# Patient Record
Sex: FEMALE | Race: WHITE | NOT HISPANIC OR LATINO | Employment: FULL TIME | URBAN - METROPOLITAN AREA
[De-identification: names, ages, dates, MRNs, and addresses within clinical notes are randomized per-mention and may not be internally consistent; named-entity substitution may affect disease eponyms.]

---
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
## 2021-09-18 ENCOUNTER — IMMUNIZATION (OUTPATIENT)
Dept: VACCINE CLINIC | Facility: HOSPITAL | Age: 21
End: 2021-09-18

## 2021-09-18 PROCEDURE — 91300 HC SARSCOV02 VAC 30MCG/0.3ML IM: CPT | Performed by: INTERNAL MEDICINE

## 2021-09-18 PROCEDURE — 0001A: CPT | Performed by: INTERNAL MEDICINE

## 2021-10-09 ENCOUNTER — IMMUNIZATION (OUTPATIENT)
Dept: VACCINE CLINIC | Facility: HOSPITAL | Age: 21
End: 2021-10-09

## 2021-10-09 PROCEDURE — 0002A: CPT | Performed by: INTERNAL MEDICINE

## 2021-10-09 PROCEDURE — 91300 HC SARSCOV02 VAC 30MCG/0.3ML IM: CPT | Performed by: INTERNAL MEDICINE

## 2022-01-28 ENCOUNTER — OFFICE VISIT (OUTPATIENT)
Dept: FAMILY MEDICINE CLINIC | Facility: CLINIC | Age: 22
End: 2022-01-28

## 2022-01-28 VITALS
BODY MASS INDEX: 22.49 KG/M2 | WEIGHT: 135 LBS | HEART RATE: 85 BPM | OXYGEN SATURATION: 98 % | HEIGHT: 65 IN | SYSTOLIC BLOOD PRESSURE: 118 MMHG | RESPIRATION RATE: 14 BRPM | DIASTOLIC BLOOD PRESSURE: 84 MMHG

## 2022-01-28 DIAGNOSIS — Z11.59 ENCOUNTER FOR HEPATITIS C SCREENING TEST FOR LOW RISK PATIENT: ICD-10-CM

## 2022-01-28 DIAGNOSIS — R53.83 MALAISE AND FATIGUE: ICD-10-CM

## 2022-01-28 DIAGNOSIS — R59.0 ADENOPATHY, CERVICAL: Primary | ICD-10-CM

## 2022-01-28 DIAGNOSIS — R50.9 FEVER AND CHILLS: ICD-10-CM

## 2022-01-28 DIAGNOSIS — B34.9 VIRAL ILLNESS: ICD-10-CM

## 2022-01-28 DIAGNOSIS — Z13.228 SCREENING FOR METABOLIC DISORDER: ICD-10-CM

## 2022-01-28 DIAGNOSIS — R53.81 MALAISE AND FATIGUE: ICD-10-CM

## 2022-01-28 DIAGNOSIS — Z13.220 SCREENING, LIPID: ICD-10-CM

## 2022-01-28 DIAGNOSIS — Z13.21 ENCOUNTER FOR VITAMIN DEFICIENCY SCREENING: ICD-10-CM

## 2022-01-28 PROBLEM — R59.9 ADENOPATHY: Status: ACTIVE | Noted: 2022-01-28

## 2022-01-28 LAB
EXPIRATION DATE: NORMAL
FLUAV AG NPH QL: NEGATIVE
FLUBV AG NPH QL: NEGATIVE
HETEROPH AB SER QL LA: NEGATIVE
INTERNAL CONTROL: NORMAL
Lab: NORMAL
S PYO AG THROAT QL: NEGATIVE

## 2022-01-28 PROCEDURE — 87804 INFLUENZA ASSAY W/OPTIC: CPT | Performed by: NURSE PRACTITIONER

## 2022-01-28 PROCEDURE — 99203 OFFICE O/P NEW LOW 30 MIN: CPT | Performed by: NURSE PRACTITIONER

## 2022-01-28 PROCEDURE — 86308 HETEROPHILE ANTIBODY SCREEN: CPT | Performed by: NURSE PRACTITIONER

## 2022-01-28 PROCEDURE — 87880 STREP A ASSAY W/OPTIC: CPT | Performed by: NURSE PRACTITIONER

## 2022-01-28 RX ORDER — DESOGESTREL AND ETHINYL ESTRADIOL 0.15-0.03
KIT ORAL
COMMUNITY
Start: 2022-01-09 | End: 2022-02-25 | Stop reason: SDUPTHER

## 2022-01-28 NOTE — PROGRESS NOTES
Subjective   Mayra Garner is a 21 y.o. female.     History of Present Illness   Patient is new to this provider. She is here to establish care. She is due for labs. Patient has not been established with primary since moving to [place] from [place], [place].      She presents today for sick visit. Patient presents with fever  x 3 weeks. Patient reports adenopathy in neck, underarms, persistent fever, and headaches. Patient has been seen at urgent care 1/18/22 , has tested negative for covid at home x 3.. Patient denies sick contacts. She lives with boyfriend who is well. Patient has tried over the counter tylenol and ibuprofen with minimal relief.     Patient presents today with dull headache, persistent, worse with bending over. Patient has tried over the counter tylenol and ibuprofen with minimal relief.    Patient reports history of vitamin D deficiency and would like labs today for fatigue. Patient is asking for B12 to be checked with labs.    The following portions of the patient's history were reviewed and updated as appropriate: allergies, current medications, past family history, past medical history, past social history, past surgical history and problem list.    Review of Systems   Constitutional: Positive for activity change, fatigue and fever. Negative for appetite change and unexpected weight loss.   HENT: Positive for congestion, postnasal drip, rhinorrhea, sinus pressure and swollen glands. Negative for sore throat.    Eyes: Negative for blurred vision and double vision.   Respiratory: Negative for cough, shortness of breath and wheezing.    Cardiovascular: Negative for chest pain, palpitations and leg swelling.   Gastrointestinal: Negative for constipation, diarrhea, nausea, vomiting and GERD.   Endocrine: Negative for cold intolerance and heat intolerance.   Genitourinary: Negative for dysuria.   Musculoskeletal: Positive for myalgias. Negative for arthralgias.   Neurological: Positive for  headache. Negative for dizziness and light-headedness.   Psychiatric/Behavioral: Negative for depressed mood.       Objective   Physical Exam  Constitutional:       Appearance: Normal appearance. She is normal weight.   HENT:      Right Ear: Tympanic membrane and ear canal normal.      Left Ear: Tympanic membrane and ear canal normal.      Mouth/Throat:      Mouth: Mucous membranes are moist.   Eyes:      Pupils: Pupils are equal, round, and reactive to light.   Cardiovascular:      Pulses: Normal pulses.      Heart sounds: Normal heart sounds.   Pulmonary:      Effort: Pulmonary effort is normal.   Abdominal:      General: Abdomen is flat. Bowel sounds are normal.      Tenderness: There is no abdominal tenderness.   Musculoskeletal:         General: Normal range of motion.      Cervical back: Normal range of motion.   Lymphadenopathy:      Cervical: Cervical adenopathy present.   Skin:     General: Skin is warm and dry.      Capillary Refill: Capillary refill takes less than 2 seconds.   Neurological:      Mental Status: She is alert.   Psychiatric:         Mood and Affect: Mood normal.         Vitals:    01/28/22 1001   BP: 118/84   Pulse: 85   Resp: 14   SpO2: 98%     Body mass index is 22.47 kg/m².    Procedures    Assessment/Plan   Problems Addressed this Visit        Symptoms and Signs    Malaise and fatigue    Relevant Orders    CBC & Differential    TSH    Vitamin B12    Folate      Other Visit Diagnoses     Adenopathy, cervical    -  Primary    Relevant Orders    POCT Influenza A/B (Completed)    POCT rapid strep A (Completed)    POCT Infectious mononucleosis antibody (Completed)    COVID-19,[facility] ROUTINE, NP/OP SWAB IN TRANSPORT MEDIA OR ESWAB 72 HR TAT - Swab, Nasopharynx    Fever and chills        Relevant Orders    POCT Influenza A/B (Completed)    POCT rapid strep A (Completed)    POCT Infectious mononucleosis antibody (Completed)    COVID-19,[facility] ROUTINE, NP/OP SWAB IN TRANSPORT MEDIA OR ESWAB 72  HR TAT - Swab, Nasopharynx    Screening for metabolic disorder        Relevant Orders    CBC & Differential    TSH    Vitamin B12    Screening, lipid        Relevant Orders    Lipid panel    Encounter for vitamin deficiency screening        Relevant Orders    Vitamin D 25 hydroxy    Viral illness        Encounter for hepatitis C screening test for low risk patient        Relevant Orders    Hepatitis C Antibody      Diagnoses       Codes Comments    Adenopathy, cervical    -  Primary ICD-10-CM: R59.0  ICD-9-CM: 785.6     Fever and chills     ICD-10-CM: R50.9  ICD-9-CM: 780.60     Screening for metabolic disorder     ICD-10-CM: Z13.228  ICD-9-CM: V77.99     Screening, lipid     ICD-10-CM: Z13.220  ICD-9-CM: V77.91     Malaise and fatigue     ICD-10-CM: R53.81, R53.83  ICD-9-CM: 780.79     Encounter for vitamin deficiency screening     ICD-10-CM: Z13.21  ICD-9-CM: V77.99     Viral illness     ICD-10-CM: B34.9  ICD-9-CM: 079.99     Encounter for hepatitis C screening test for low risk patient     ICD-10-CM: Z11.59  ICD-9-CM: V73.89       CBC  CMP  Lipids  TSH  B12/folate/vit D    Fever/chills/adenopathy- flu/strep/mono/covid pcr, likely viral etiology  Rest, hydrate, fluids, will check labs and covid PCR, call for conc[facility]rs. [facility]    Follow up in 6 mo for annual and pap       Return in about 6 months (around 7/28/2022) for Annual.

## 2022-01-29 LAB
25(OH)D3+25(OH)D2 SERPL-MCNC: 35.5 NG/ML (ref 30–100)
BASOPHILS # BLD AUTO: 0 X10E3/UL (ref 0–0.2)
BASOPHILS NFR BLD AUTO: 1 %
CHOLEST SERPL-MCNC: 165 MG/DL (ref 100–199)
EOSINOPHIL # BLD AUTO: 0.1 X10E3/UL (ref 0–0.4)
EOSINOPHIL NFR BLD AUTO: 2 %
ERYTHROCYTE [DISTWIDTH] IN BLOOD BY AUTOMATED COUNT: 11.8 % (ref 11.7–15.4)
FOLATE SERPL-MCNC: 18 NG/ML
HCT VFR BLD AUTO: 40.9 % (ref 34–46.6)
HDLC SERPL-MCNC: 85 MG/DL
HGB BLD-MCNC: 13.2 G/DL (ref 11.1–15.9)
IMM GRANULOCYTES # BLD AUTO: 0 X10E3/UL (ref 0–0.1)
IMM GRANULOCYTES NFR BLD AUTO: 0 %
LDLC SERPL CALC-MCNC: 66 MG/DL (ref 0–99)
LYMPHOCYTES # BLD AUTO: 1.6 X10E3/UL (ref 0.7–3.1)
LYMPHOCYTES NFR BLD AUTO: 45 %
MCH RBC QN AUTO: 28.1 PG (ref 26.6–33)
MCHC RBC AUTO-ENTMCNC: 32.3 G/DL (ref 31.5–35.7)
MCV RBC AUTO: 87 FL (ref 79–97)
MONOCYTES # BLD AUTO: 0.3 X10E3/UL (ref 0.1–0.9)
MONOCYTES NFR BLD AUTO: 9 %
NEUTROPHILS # BLD AUTO: 1.6 X10E3/UL (ref 1.4–7)
NEUTROPHILS NFR BLD AUTO: 43 %
PLATELET # BLD AUTO: 297 X10E3/UL (ref 150–450)
RBC # BLD AUTO: 4.7 X10E6/UL (ref 3.77–5.28)
TRIGL SERPL-MCNC: 74 MG/DL (ref 0–149)
TSH SERPL-ACNC: 1.16 UIU/ML (ref 0.45–4.5)
VIT B12 SERPL-MCNC: 574 PG/ML (ref 232–1245)
VLDLC SERPL CALC-MCNC: 14 MG/DL (ref 5–40)
WBC # BLD AUTO: 3.6 X10E3/UL (ref 3.4–10.8)

## 2022-01-30 LAB
HCV AB S/CO SERPL IA: <0.1 S/CO RATIO (ref 0–0.9)
LABCORP SARS-COV-2, NAA 2 DAY TAT: NORMAL
SARS-COV-2 RNA RESP QL NAA+PROBE: NOT DETECTED

## 2022-02-25 ENCOUNTER — OFFICE VISIT (OUTPATIENT)
Dept: FAMILY MEDICINE CLINIC | Facility: CLINIC | Age: 22
End: 2022-02-25

## 2022-02-25 VITALS
HEIGHT: 65 IN | BODY MASS INDEX: 21.99 KG/M2 | DIASTOLIC BLOOD PRESSURE: 78 MMHG | RESPIRATION RATE: 14 BRPM | WEIGHT: 132 LBS | OXYGEN SATURATION: 100 % | HEART RATE: 84 BPM | SYSTOLIC BLOOD PRESSURE: 118 MMHG

## 2022-02-25 DIAGNOSIS — Z23 COVID-19 VACCINE ADMINISTERED: ICD-10-CM

## 2022-02-25 DIAGNOSIS — Z00.00 ENCOUNTER FOR SCREENING AND PREVENTATIVE CARE: Primary | ICD-10-CM

## 2022-02-25 DIAGNOSIS — Z13.228 SCREENING FOR METABOLIC DISORDER: ICD-10-CM

## 2022-02-25 DIAGNOSIS — Z30.41 ENCOUNTER FOR SURVEILLANCE OF CONTRACEPTIVE PILLS: ICD-10-CM

## 2022-02-25 PROBLEM — R53.81 MALAISE AND FATIGUE: Status: RESOLVED | Noted: 2022-01-28 | Resolved: 2022-02-25

## 2022-02-25 PROBLEM — R53.83 MALAISE AND FATIGUE: Status: RESOLVED | Noted: 2022-01-28 | Resolved: 2022-02-25

## 2022-02-25 PROBLEM — R59.9 ADENOPATHY: Status: RESOLVED | Noted: 2022-01-28 | Resolved: 2022-02-25

## 2022-02-25 PROCEDURE — 91305 COVID-19 (PFIZER) 12+ YRS: CPT | Performed by: NURSE PRACTITIONER

## 2022-02-25 PROCEDURE — 99395 PREV VISIT EST AGE 18-39: CPT | Performed by: NURSE PRACTITIONER

## 2022-02-25 PROCEDURE — 0051A COVID-19 (PFIZER) 12+ YRS: CPT | Performed by: NURSE PRACTITIONER

## 2022-02-25 RX ORDER — DESOGESTREL AND ETHINYL ESTRADIOL 0.15-0.03
1 KIT ORAL DAILY
Qty: 28 TABLET | Refills: 3 | Status: SHIPPED | OUTPATIENT
Start: 2022-02-25 | End: 2022-04-15

## 2022-02-25 NOTE — PATIENT INSTRUCTIONS
Preventive Care 18-21 Years Old, Female  Preventive care refers to lifestyle choices and visits with your health care provider that can promote health and wellness. At this stage in your life, you may start seeing a primary care physician instead of a pediatrician. It is important to take responsibility for your health and well-being. Preventive care for young adults includes:  · A yearly physical exam. This is also called an annual wellness visit.  · Regular dental and eye exams.  · Immunizations.  · Screening for certain conditions.  · Healthy lifestyle choices, such as:  ? Eating a healthy diet.  ? Getting regular exercise.  ? Not using drugs or products that contain nicotine and tobacco.  ? Limiting alcohol use.  What can I expect for my preventive care visit?  Physical exam  Your health care provider may check your:  · Height and weight. These may be used to calculate your BMI (body mass index). BMI is a measurement that tells if you are at a healthy weight.  · Heart rate and blood pressure.  · Body temperature.  · Skin for abnormal spots.  Counseling  Your health care provider may ask you questions about your:  · Past medical problems.  · Family's medical history.  · Alcohol, tobacco, and drug use.  · Home life and relationship well-being.  · Access to firearms.  · Emotional well-being.  · Diet, exercise, and sleep habits.  · Sexual activity and sexual health.  · Method of birth control.  · Menstrual cycle.  · Pregnancy history.  What immunizations do I need?    Vaccines are usually given at various ages, according to a schedule. Your health care provider will recommend vaccines for you based on your age, medical history, and lifestyle or other factors, such as travel or where you work.  What tests do I need?  Blood tests  · Lipid and cholesterol levels. These may be checked every 5 years starting at age 20.  · Hepatitis C test.  · Hepatitis B test.  Screening  · Pelvic exam and Pap test. This may be done  every 3 years starting at age 21.  · STD (sexually transmitted disease) testing, if you are at risk.  · BRCA-related cancer screening. This may be done if you have a family history of breast, ovarian, tubal, or peritoneal cancers.  Other tests  · Tuberculosis skin test.  · Vision and hearing tests.  · Skin exam.  · Breast exam.  Talk with your health care provider about your test results, treatment options, and if necessary, the need for more tests.  Follow these instructions at home:  Eating and drinking    · Eat a healthy diet that includes fresh fruits and vegetables, whole grains, lean protein, and low-fat dairy products.  · Drink enough fluid to keep your urine pale yellow.  · Do not drink alcohol if:  ? Your health care provider tells you not to drink.  ? You are pregnant, may be pregnant, or are planning to become pregnant.  ? You are under the legal drinking age. In the [place] the legal drinking age is 21.  · If you drink alcohol:  ? Limit how much you use to 0-1 drink a day.  ? Be aware of how much alcohol is in your drink. In the [place] one drink equals one 12 oz bottle of beer (355 mL), one 5 oz glass of wine (148 mL), or one 1½ oz glass of hard liquor (44 mL).    Lifestyle  · Take daily care of your teeth and gums. Brush your teeth every morning and night with fluoride toothpaste. Floss one time each day.  · Stay active. Exercise for at least 30 minutes 5 or more days of the week.  · Do not use any products that contain nicotine or tobacco, such as cigarettes, e-cigarettes, and chewing tobacco. If you need help quitting, ask your health care provider.  · Do not use drugs.  · If you are sexually active, practice safe sex. Use a condom or other form of birth control (contraception) in order to prevent pregnancy and STIs (sexually transmitted infections). If you plan to become pregnant, see your health care provider for a pre-conception visit.  · Find healthy ways to cope with stress, such as:  ? Meditation,  yoga, or listening to music.  ? Journaling.  ? Talking to a trusted person.  ? Spending time with friends and family.  Safety  · Always wear your seat belt while driving or riding in a vehicle.  · Do not drive:  ? If you have been drinking alcohol. Do not ride with someone who has been drinking.  ? When you are tired or distracted.  ? While texting.  · Wear a helmet and other protective equipment during sports activities.  · If you have firearms in your house, make sure you follow all gun safety procedures.  · Seek help if you have been bullied, physically abused, or sexually abused.  · Use the Internet responsibly to avoid dangers, such as online bullying and online sex predators.  What's next?  · Go to your health care provider once a year for an annual wellness visit.  · Ask your health care provider how often you should have your eyes and teeth checked.  · Stay up to date on all vaccines.  This information is not intended to replace advice given to you by your health care provider. Make sure you discuss any questions you have with your health care provider.  Document Revised: 09/02/2020 Document Reviewed: 12/12/2019  Elsevier Patient Education © 2021 [employer]

## 2022-02-25 NOTE — PROGRESS NOTES
Subjective   Mayra Garner is a 21 y.o. female.     History of Present Illness   Patient is due annual exam.  Patient's sore throat and fatigued has improved. She is asking for birth control refills today. Reports some intermittent, heavy periods. Patient has been on Apri x 3 months. Patient lives with longterm boyfriend. She denies alcohol, tobacoo or drug use. Denies depression or anxiety.   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 Requesting paperwork today at visit.     The following portions of the patient's history were reviewed and updated as appropriate: allergies, current medications, past family history, past medical history, past social history, past surgical history and problem list.    Review of Systems   Constitutional: Negative for activity change, fatigue, unexpected weight gain and unexpected weight loss.   HENT: Negative for congestion and postnasal drip.    Eyes: Negative for blurred vision and double vision.   Respiratory: Negative for cough and chest tightness.    Cardiovascular: Negative for chest pain, palpitations and leg swelling.   Gastrointestinal: Negative for abdominal pain, constipation, diarrhea and GERD.   Endocrine: Negative for cold intolerance, heat intolerance, polydipsia, polyphagia and polyuria.   Genitourinary: Negative for dysuria, frequency, menstrual problem, pelvic pain and pelvic pressure.   Musculoskeletal: Negative for arthralgias.   Neurological: Negative for dizziness.   Hematological: Does not bruise/bleed easily.   Psychiatric/Behavioral: Negative for depressed mood. The patient is not nervous/anxious.        Objective   Physical Exam  Constitutional:       Appearance: Normal appearance. She is normal weight.   HENT:      Head: Normocephalic.      Right Ear: Tympanic membrane normal.      Left Ear: Tympanic membrane normal.      Nose: Nose normal.      Mouth/Throat:      Mouth: Mucous membranes are moist.   Eyes:       Pupils: Pupils are equal, round, and reactive to light.   Cardiovascular:      Rate and Rhythm: Normal rate and regular rhythm.      Pulses: Normal pulses.      Heart sounds: Normal heart sounds.   Pulmonary:      Effort: Pulmonary effort is normal.      Breath sounds: Normal breath sounds.   Abdominal:      General: Abdomen is flat. Bowel sounds are normal.      Palpations: Abdomen is soft.   Musculoskeletal:         General: Normal range of motion.      Cervical back: Normal range of motion.   Skin:     General: Skin is warm.   Neurological:      General: No focal deficit present.      Mental Status: She is alert.   Psychiatric:         Mood and Affect: Mood normal.         Vitals:    02/25/22 0814   BP: 118/78   Pulse: 84   Resp: 14   SpO2: 100%     Body mass index is 21.97 kg/m².    Procedures    Assessment/Plan   Problems Addressed this Visit     None      Visit Diagnoses     Encounter for screening and preventative care    -  Primary    Screening for metabolic disorder        Encounter for surveillance of contraceptive pills        COVID-19 vaccine administered          Diagnoses       Codes Comments    Encounter for screening and preventative care    -  Primary ICD-10-CM: Z00.00  ICD-9-CM: V70.0     Screening for metabolic disorder     ICD-10-CM: Z13.228  ICD-9-CM: V77.99     Encounter for surveillance of contraceptive pills     ICD-10-CM: Z30.41  ICD-9-CM: V25.41     COVID-19 vaccine administered     ICD-10-CM: Z23  ICD-9-CM: V04.89       Reviewed all recent labs/up to date on screening  Signed PPW for patient to provide respite care for [facility]    OCP- reviewed medication that effect profile, refills sent encourage safe sex practices, patient to return to clinic for Pap smear    Covid booster administered today, patient tolerated well, encourage masking, social distancing, handwashing.  Follow CDC guidelines.    Preventative care-education provided for heart healthy diet, drink water, walk daily, wear  a seatbelt, patient will return for Pap smear and HPV vaccination series.    Return in 1 year for annual   MyChart is active  Reviewed preventative care measures see education with after visit summary         Return in about 1 year (around 2/25/2023) for Annual.

## 2022-03-30 ENCOUNTER — OFFICE VISIT (OUTPATIENT)
Dept: FAMILY MEDICINE CLINIC | Facility: CLINIC | Age: 22
End: 2022-03-30

## 2022-03-30 ENCOUNTER — HOSPITAL ENCOUNTER (OUTPATIENT)
Dept: GENERAL RADIOLOGY | Facility: HOSPITAL | Age: 22
Discharge: HOME | End: 2022-03-30
Admitting: NURSE PRACTITIONER

## 2022-03-30 VITALS
HEIGHT: 65 IN | RESPIRATION RATE: 14 BRPM | HEART RATE: 70 BPM | SYSTOLIC BLOOD PRESSURE: 138 MMHG | OXYGEN SATURATION: 99 % | DIASTOLIC BLOOD PRESSURE: 82 MMHG | WEIGHT: 132 LBS | BODY MASS INDEX: 21.99 KG/M2

## 2022-03-30 DIAGNOSIS — R10.84 GENERALIZED ABDOMINAL PAIN: Primary | ICD-10-CM

## 2022-03-30 PROCEDURE — 74019 RADEX ABDOMEN 2 VIEWS: CPT

## 2022-03-30 PROCEDURE — 99213 OFFICE O/P EST LOW 20 MIN: CPT | Performed by: NURSE PRACTITIONER

## 2022-03-30 NOTE — PROGRESS NOTES
Subjective   Mayra Garner is a 21 y.o. female.   Abdominal pain  History of Present Illness  Started about 3 weeks ago. After she eats she has pain and becomes diaphoretic. She went to the Urgent Care a week ago and they told her to take some Nexium which has not helped.  Is having a bowel movement daily Sometimes they vary in size.  Other than the Nexium she has not taken any other over-the-counter medications.    The following portions of the patient's history were reviewed and updated as appropriate: allergies, current medications, past family history, past medical history, past social history, past surgical history and problem list.    Review of Systems   Constitutional: Positive for activity change and diaphoresis. Negative for appetite change, chills and fatigue.   Respiratory: Negative for cough and shortness of breath.    Cardiovascular: Negative for chest pain.   Gastrointestinal: Positive for constipation and nausea. Negative for abdominal distention, abdominal pain and vomiting.       Objective   Physical Exam    Assessment/Plan   Diagnoses and all orders for this visit:    1. Generalized abdominal pain (Primary)  -     XR abdomen flat and upright

## 2022-04-15 ENCOUNTER — OFFICE VISIT (OUTPATIENT)
Dept: FAMILY MEDICINE CLINIC | Facility: CLINIC | Age: 22
End: 2022-04-15

## 2022-04-15 VITALS
OXYGEN SATURATION: 99 % | DIASTOLIC BLOOD PRESSURE: 72 MMHG | SYSTOLIC BLOOD PRESSURE: 104 MMHG | HEIGHT: 65 IN | BODY MASS INDEX: 22.82 KG/M2 | RESPIRATION RATE: 12 BRPM | HEART RATE: 72 BPM | WEIGHT: 137 LBS

## 2022-04-15 DIAGNOSIS — Z30.09 ENCOUNTER FOR OTHER GENERAL COUNSELING OR ADVICE ON CONTRACEPTION: Primary | ICD-10-CM

## 2022-04-15 DIAGNOSIS — K59.01 SLOW TRANSIT CONSTIPATION: ICD-10-CM

## 2022-04-15 PROCEDURE — 99213 OFFICE O/P EST LOW 20 MIN: CPT | Performed by: NURSE PRACTITIONER

## 2022-04-15 RX ORDER — NORGESTIMATE AND ETHINYL ESTRADIOL 0.25-0.035
1 KIT ORAL DAILY
Qty: 28 TABLET | Refills: 12 | Status: SHIPPED | OUTPATIENT
Start: 2022-04-15 | End: 2022-06-09 | Stop reason: SDUPTHER

## 2022-04-15 NOTE — PROGRESS NOTES
Subjective   Mayra Garner is a 21 y.o. female.     History of Present Illness   Patient is known to this provider.   Patient here to discuss birth control. She has been on apri and switched but is unsure of name of new pill. She has had 3 week long period. She has had IUD in the past and did not like this. Normal menstrual cramps and fatigue.  Patient has taken Berta in the past and tolerated well.    Patient presents with new onset constipation over last month, she has tried ducolax and miralax. She has made dietary changes and started new workout routine and is trying to get 150 gm protein/day. She drinks 1 gallon of water a day.     The following portions of the patient's history were reviewed and updated as appropriate: allergies, current medications, past family history, past medical history, past social history, past surgical history and problem list.    Review of Systems   Constitutional: Positive for fatigue.   Respiratory: Negative for cough, shortness of breath and stridor.    Cardiovascular: Negative for chest pain, palpitations and leg swelling.   Gastrointestinal: Positive for constipation. Negative for diarrhea and GERD.   Genitourinary: Positive for menstrual problem and vaginal bleeding.   Skin: Negative for pallor.   Neurological: Negative for weakness and headache.   Hematological: Does not bruise/bleed easily.   Psychiatric/Behavioral: Negative for depressed mood. The patient is not nervous/anxious.        Objective   Physical Exam  Constitutional:       Appearance: Normal appearance. She is normal weight.   HENT:      Head: Normocephalic.      Right Ear: Tympanic membrane normal.      Left Ear: Tympanic membrane normal.      Nose: Nose normal.      Mouth/Throat:      Mouth: Mucous membranes are moist.   Eyes:      Pupils: Pupils are equal, round, and reactive to light.   Cardiovascular:      Rate and Rhythm: Normal rate and regular rhythm.      Pulses: Normal pulses.      Heart sounds: Normal  heart sounds.   Pulmonary:      Effort: Pulmonary effort is normal.      Breath sounds: Normal breath sounds.   Abdominal:      General: Abdomen is flat. Bowel sounds are normal.      Palpations: Abdomen is soft.   Musculoskeletal:         General: Normal range of motion.      Cervical back: Normal range of motion.   Skin:     General: Skin is warm.   Neurological:      General: No focal deficit present.      Mental Status: She is alert.   Psychiatric:         Mood and Affect: Mood normal.         Vitals:    04/15/22 0847   BP: 104/72   Pulse: 72   Resp: 12   SpO2: 99%     Body mass index is 22.8 kg/m².    Procedures    Assessment/Plan   Problems Addressed this Visit    None     Visit Diagnoses     Encounter for other general counseling or advice on contraception    -  Primary      Diagnoses       Codes Comments    Encounter for other general counseling or advice on contraception    -  Primary ICD-10-CM: Z30.09  ICD-9-CM: V25.09         Birth control counseling-patient declines GYN referral at this time, stop new birth control and restart ervin as ordered    Constipation- starting greens supplement/microfactor probiotic,  and miralax as needed, colace and 2 prunes/day, dates and prebiotics. Hydrate w water, consider lessening protein goal.     Follow-up in 6 months and as needed  And MyChart questions to this provider  Education attached to AVS for constipation       No follow-ups on file.

## 2022-06-09 ENCOUNTER — TELEPHONE (OUTPATIENT)
Dept: FAMILY MEDICINE CLINIC | Facility: CLINIC | Age: 22
End: 2022-06-09

## 2022-06-09 RX ORDER — NORGESTIMATE AND ETHINYL ESTRADIOL 0.25-0.035
1 KIT ORAL DAILY
Qty: 84 TABLET | Refills: 2 | Status: SHIPPED | OUTPATIENT
Start: 2022-06-09 | End: 2022-09-06 | Stop reason: SDUPTHER

## 2022-06-09 NOTE — TELEPHONE ENCOUNTER
PATIENT WAS CONTACTED BY [facility], IN ORDER TO BE APPROVED BY INSURANCE, PROVIDER NEEDS TO PRESCRIBE 90 DAYS SUPPLY OF norgestimate-ethinyl estradiol (Berta) 0.25-35 MG-MCG per tablet    [facility]/pharmacy #[id number] - [place], [place] - [address] - [phone] [facility] [phone] FX

## 2022-07-13 ENCOUNTER — OFFICE VISIT (OUTPATIENT)
Dept: FAMILY MEDICINE CLINIC | Facility: CLINIC | Age: 22
End: 2022-07-13

## 2022-07-13 VITALS
RESPIRATION RATE: 18 BRPM | OXYGEN SATURATION: 99 % | SYSTOLIC BLOOD PRESSURE: 130 MMHG | DIASTOLIC BLOOD PRESSURE: 78 MMHG | BODY MASS INDEX: 22.82 KG/M2 | HEIGHT: 65 IN | WEIGHT: 137 LBS | HEART RATE: 91 BPM

## 2022-07-13 DIAGNOSIS — J30.1 SEASONAL ALLERGIC RHINITIS DUE TO POLLEN: ICD-10-CM

## 2022-07-13 DIAGNOSIS — R59.0 CERVICAL LYMPHADENOPATHY: ICD-10-CM

## 2022-07-13 DIAGNOSIS — R09.81 SINUS CONGESTION: Primary | ICD-10-CM

## 2022-07-13 LAB
EXPIRATION DATE: NORMAL
EXPIRATION DATE: NORMAL
HETEROPH AB SER QL LA: NEGATIVE
INTERNAL CONTROL: NORMAL
INTERNAL CONTROL: NORMAL
Lab: NORMAL
Lab: NORMAL
S PYO AG THROAT QL: NEGATIVE

## 2022-07-13 PROCEDURE — 87880 STREP A ASSAY W/OPTIC: CPT | Performed by: NURSE PRACTITIONER

## 2022-07-13 PROCEDURE — 86308 HETEROPHILE ANTIBODY SCREEN: CPT | Performed by: NURSE PRACTITIONER

## 2022-07-13 PROCEDURE — 99213 OFFICE O/P EST LOW 20 MIN: CPT | Performed by: NURSE PRACTITIONER

## 2022-07-13 RX ORDER — COVID-19 MOLECULAR TEST ASSAY
KIT MISCELLANEOUS
COMMUNITY
Start: 2022-05-24 | End: 2023-02-09

## 2022-07-13 RX ORDER — FLUTICASONE PROPIONATE 50 MCG
2 SPRAY, SUSPENSION (ML) NASAL DAILY
Qty: 18.2 ML | Refills: 5 | Status: SHIPPED | OUTPATIENT
Start: 2022-07-13 | End: 2023-03-02

## 2022-07-13 NOTE — PROGRESS NOTES
Subjective   Mayra Garner is a 21 y.o. female.     History of Present Illness   Established patient.    Cervical node on the Right is coming and going x approx1 year. Patient has photos on her phone of this being enlarged. No chills, weight loss, excess fatigue. Normal CBC in Jan. Some sweats . No fevers. She has been negative in the past for mono and strep. Throat is dry today, not painful.  Presents today with congestions and sinus pressure x1 week. Home covid test today is negative. Mild sore throat and allergy symptoms. On allegra. No fever, no headache, some facial fullness, nasal congestion.     The following portions of the patient's history were reviewed and updated as appropriate: allergies, current medications, past family history, past medical history, past social history, past surgical history and problem list.    Review of Systems    Objective   Physical Exam  Vitals reviewed.   Constitutional:       Appearance: Normal appearance. She is normal weight.   HENT:      Head: Normocephalic.      Right Ear: Tympanic membrane normal.      Left Ear: Tympanic membrane normal.      Nose: Congestion present.      Mouth/Throat:      Mouth: Mucous membranes are moist.      Pharynx: No oropharyngeal exudate or posterior oropharyngeal erythema.   Cardiovascular:      Rate and Rhythm: Normal rate and regular rhythm.      Pulses: Normal pulses.      Heart sounds: Normal heart sounds.   Pulmonary:      Effort: Pulmonary effort is normal.      Breath sounds: Normal breath sounds.   Musculoskeletal:         General: Normal range of motion.      Cervical back: Normal range of motion.   Lymphadenopathy:      Cervical: Cervical adenopathy (R ) present.   Skin:     General: Skin is warm.   Neurological:      General: No focal deficit present.      Mental Status: She is alert.   Psychiatric:         Mood and Affect: Mood normal.         Vitals:    07/13/22 1613   BP: 130/78   Pulse: 91   Resp: 18   SpO2: 99%     Body mass  index is 22.8 kg/m².    Procedures    Assessment & Plan   Problems Addressed this Visit    None     Visit Diagnoses     Sinus congestion    -  Primary    Relevant Orders    POCT rapid strep A (Completed)    POCT Infectious mononucleosis antibody (Completed)    Cervical lymphadenopathy        Relevant Orders    US Head Neck Soft Tissue    POCT rapid strep A (Completed)    POCT Infectious mononucleosis antibody (Completed)    Seasonal allergic rhinitis due to pollen        Relevant Orders    POCT rapid strep A (Completed)    POCT Infectious mononucleosis antibody (Completed)      Diagnoses       Codes Comments    Sinus congestion    -  Primary ICD-10-CM: R09.81  ICD-9-CM: 478.19     Cervical lymphadenopathy     ICD-10-CM: R59.0  ICD-9-CM: 785.6     Seasonal allergic rhinitis due to pollen     ICD-10-CM: J30.1  ICD-9-CM: 477.0         Orders Placed This Encounter   Procedures   • US Head Neck Soft Tissue     Standing Status:   Future     Standing Expiration Date:   7/13/2023     Order Specific Question:   Reason for Exam:     Answer:   R cervical lymph node swelling intermittently     Order Specific Question:   Release to patient     Answer:   Immediate   • POCT rapid strep A     Order Specific Question:   Release to patient     Answer:   Immediate   • POCT Infectious mononucleosis antibody     Order Specific Question:   Release to patient     Answer:   Immediate       Cervical lymphadenopathy- strep, mono both negative today. Order  ultrasound lymph node. Call if fever or worsening sx    Congestion- add flonase 2 sprays qd, cw allegra, may alternate w zyrtec.  Rest and hydrate, call for fever or worsening symptoms.  May take Tylenol for pain     Education provided in AVS   Return if symptoms worsen or fail to improve.

## 2022-08-02 ENCOUNTER — HOSPITAL ENCOUNTER (OUTPATIENT)
Dept: ULTRASOUND IMAGING | Facility: HOSPITAL | Age: 22
Discharge: HOME | End: 2022-08-02
Admitting: NURSE PRACTITIONER

## 2022-08-02 DIAGNOSIS — R59.0 CERVICAL LYMPHADENOPATHY: ICD-10-CM

## 2022-08-02 PROCEDURE — 76536 US EXAM OF HEAD AND NECK: CPT

## 2022-08-03 DIAGNOSIS — R59.0 LYMPHADENOPATHY OF RIGHT CERVICAL REGION: Primary | ICD-10-CM

## 2022-09-06 RX ORDER — NORGESTIMATE AND ETHINYL ESTRADIOL 0.25-0.035
1 KIT ORAL DAILY
Qty: 84 TABLET | Refills: 2 | Status: SHIPPED | OUTPATIENT
Start: 2022-09-06 | End: 2022-12-13

## 2022-09-06 NOTE — TELEPHONE ENCOUNTER
I will send refills for her birth control but she will need a Pap smear at some point to get updated.  Does she have any idea when her last Pap smear was?  If she has concerns with her birth control will need to refer to GYN.  She declined referral at last appointment.  Thanks, Neva

## 2022-12-13 ENCOUNTER — OFFICE VISIT (OUTPATIENT)
Dept: FAMILY MEDICINE CLINIC | Facility: CLINIC | Age: 22
End: 2022-12-13

## 2022-12-13 VITALS
OXYGEN SATURATION: 97 % | BODY MASS INDEX: 21.99 KG/M2 | SYSTOLIC BLOOD PRESSURE: 144 MMHG | HEIGHT: 65 IN | WEIGHT: 132 LBS | DIASTOLIC BLOOD PRESSURE: 84 MMHG

## 2022-12-13 DIAGNOSIS — M54.50 ACUTE MIDLINE LOW BACK PAIN WITHOUT SCIATICA: ICD-10-CM

## 2022-12-13 DIAGNOSIS — R11.0 NAUSEA: Primary | ICD-10-CM

## 2022-12-13 DIAGNOSIS — R82.90 MALODOROUS URINE: ICD-10-CM

## 2022-12-13 LAB
B-HCG UR QL: NEGATIVE
BILIRUB BLD-MCNC: NEGATIVE MG/DL
CLARITY, POC: CLEAR
COLOR UR: YELLOW
EXPIRATION DATE: ABNORMAL
GLUCOSE UR STRIP-MCNC: NEGATIVE MG/DL
INTERNAL NEGATIVE CONTROL: ABNORMAL
INTERNAL POSITIVE CONTROL: ABNORMAL
KETONES UR QL: NEGATIVE
LEUKOCYTE EST, POC: NEGATIVE
Lab: ABNORMAL
NITRITE UR-MCNC: NEGATIVE MG/ML
PH UR: 5 [PH] (ref 5–8)
PROT UR STRIP-MCNC: NEGATIVE MG/DL
RBC # UR STRIP: NEGATIVE /UL
SP GR UR: 1.01 (ref 1–1.03)
UROBILINOGEN UR QL: NORMAL

## 2022-12-13 PROCEDURE — 81025 URINE PREGNANCY TEST: CPT | Performed by: NURSE PRACTITIONER

## 2022-12-13 PROCEDURE — 81002 URINALYSIS NONAUTO W/O SCOPE: CPT | Performed by: NURSE PRACTITIONER

## 2022-12-13 PROCEDURE — 99213 OFFICE O/P EST LOW 20 MIN: CPT | Performed by: NURSE PRACTITIONER

## 2022-12-13 NOTE — PROGRESS NOTES
Subjective   Mayra Garner is a 22 y.o. female.     History of Present Illness   Established pt, acute visit for malodorous urine, dizziness and nausea    She is now off OCP, she stopped 3 months ago. Is using condoms. She had IUD and this dislodged in the past. Some dizziness and nausea. New sex partner within last year. Has taken at home pregnancy test which was negative.     Low back pain, nausea and dizziness x 3-4 days. She has had some urine odor. No fever, chills. She takes cranberry supplement daily. She is taking fenugreek capsules.     The following portions of the patient's history were reviewed and updated as appropriate: allergies, current medications, past family history, past medical history, past social history, past surgical history and problem list.    Review of Systems    Objective   Physical Exam  HENT:      Head: Normocephalic.      Right Ear: Tympanic membrane normal.      Left Ear: Tympanic membrane normal.      Mouth/Throat:      Mouth: Mucous membranes are moist.   Eyes:      Pupils: Pupils are equal, round, and reactive to light.   Cardiovascular:      Rate and Rhythm: Normal rate and regular rhythm.      Pulses: Normal pulses.      Heart sounds: Normal heart sounds.   Pulmonary:      Effort: Pulmonary effort is normal.      Breath sounds: Normal breath sounds.   Abdominal:      General: Bowel sounds are normal.      Palpations: Abdomen is soft.      Tenderness: There is no abdominal tenderness. There is no right CVA tenderness or left CVA tenderness.   Musculoskeletal:         General: Normal range of motion.        Back:       Comments: Mild tenderness, no flank pain   Skin:     General: Skin is warm and dry.   Neurological:      General: No focal deficit present.      Mental Status: She is alert.         Vitals:    12/13/22 1047   BP: 144/84   SpO2: 97%     Body mass index is 21.97 kg/m².    Procedures    Assessment & Plan   Problems Addressed this Visit    None  Visit Diagnoses      Nausea    -  Primary    Malodorous urine        Relevant Orders    Chlamydia trachomatis, Neisseria gonorrhoeae, PCR - , Urine, Random Void    Acute midline low back pain without sciatica          Diagnoses       Codes Comments    Nausea    -  Primary ICD-10-CM: R11.0  ICD-9-CM: 787.02     Malodorous urine     ICD-10-CM: R82.90  ICD-9-CM: 791.9     Acute midline low back pain without sciatica     ICD-10-CM: M54.50  ICD-9-CM: 724.2         UA is negative, urine pregnancy test is negative    Encourage patient to stay hydrated with water, take cranberry and d-mannose, reviewed proper hygiene to avoid UTI.  Return if fever or worsening symptoms.  Supplements may cause a urine odor.  Call if dizziness or nausea worsens, hydrate and make slow position changes  Will screen for STI, encourage patient to practice safe sex and discussed pregnancy prevention.  She declined GYN referral at this time       Education provided in AVS   Return if symptoms worsen or fail to improve.

## 2022-12-15 LAB
C TRACH RRNA SPEC QL NAA+PROBE: NEGATIVE
N GONORRHOEA RRNA SPEC QL NAA+PROBE: NEGATIVE

## 2023-02-09 ENCOUNTER — OFFICE VISIT (OUTPATIENT)
Dept: FAMILY MEDICINE CLINIC | Facility: CLINIC | Age: 23
End: 2023-02-09

## 2023-02-09 VITALS
HEART RATE: 89 BPM | RESPIRATION RATE: 18 BRPM | OXYGEN SATURATION: 100 % | DIASTOLIC BLOOD PRESSURE: 78 MMHG | BODY MASS INDEX: 22.63 KG/M2 | WEIGHT: 136 LBS | SYSTOLIC BLOOD PRESSURE: 128 MMHG

## 2023-02-09 DIAGNOSIS — O26.811 PREGNANCY RELATED FATIGUE IN FIRST TRIMESTER: ICD-10-CM

## 2023-02-09 DIAGNOSIS — Z34.01 FIRST PREGNANCY, FIRST TRIMESTER: Primary | ICD-10-CM

## 2023-02-09 LAB
B-HCG UR QL: POSITIVE
EXPIRATION DATE: ABNORMAL
INTERNAL NEGATIVE CONTROL: ABNORMAL
INTERNAL POSITIVE CONTROL: ABNORMAL
Lab: ABNORMAL

## 2023-02-09 PROCEDURE — 81025 URINE PREGNANCY TEST: CPT | Performed by: NURSE PRACTITIONER

## 2023-02-09 PROCEDURE — 99213 OFFICE O/P EST LOW 20 MIN: CPT | Performed by: NURSE PRACTITIONER

## 2023-02-09 NOTE — PROGRESS NOTES
Subjective   Mayra Garner is a 22 y.o. female.     History of Present Illness   Estab pt , here for + pregnancy    Here for new first pregnancy. LMP Dec 30. She is having some fatigue and very hungry. No vomiting. She is here for pregnancy testing and referral to OBGYN    The following portions of the patient's history were reviewed and updated as appropriate: allergies, current medications, past family history, past medical history, past social history, past surgical history and problem list.    Review of Systems    Objective   Physical Exam  Vitals reviewed.   Constitutional:       Appearance: Normal appearance.   HENT:      Mouth/Throat:      Mouth: Mucous membranes are moist.   Cardiovascular:      Rate and Rhythm: Normal rate and regular rhythm.      Pulses: Normal pulses.      Heart sounds: Normal heart sounds.   Pulmonary:      Effort: Pulmonary effort is normal.      Breath sounds: Normal breath sounds.   Abdominal:      General: Bowel sounds are normal.   Musculoskeletal:         General: Normal range of motion.      Cervical back: Normal range of motion.   Skin:     General: Skin is warm and dry.   Neurological:      General: No focal deficit present.      Mental Status: She is alert and oriented to person, place, and time.         Vitals:    02/09/23 0757   BP: 128/78   Pulse: 89   Resp: 18   SpO2: 100%     Body mass index is 22.63 kg/m².    Procedures    Assessment & Plan   Problems Addressed this Visit    None  Visit Diagnoses     First pregnancy, first trimester    -  Primary    Relevant Orders    Ambulatory Referral to Gynecology    POCT pregnancy, urine (Completed)    Pregnancy related fatigue in first trimester        Relevant Orders    Ambulatory Referral to Gynecology    POCT pregnancy, urine (Completed)      Diagnoses       Codes Comments    First pregnancy, first trimester    -  Primary ICD-10-CM: Z34.01  ICD-9-CM: V22.0     Pregnancy related fatigue in first trimester     ICD-10-CM:  O26.811  ICD-9-CM: 646.83, 780.79           Pregnancy- + pregnancy test here, she is on prenatal vitamin, rest and hydrate. Refer to OBGYN     Education provided in AVS   Return if symptoms worsen or fail to improve.

## 2023-03-02 ENCOUNTER — INITIAL PRENATAL (OUTPATIENT)
Dept: OBSTETRICS AND GYNECOLOGY | Age: 23
End: 2023-03-02
Payer: COMMERCIAL

## 2023-03-02 VITALS — BODY MASS INDEX: 22.47 KG/M2 | WEIGHT: 135 LBS | SYSTOLIC BLOOD PRESSURE: 118 MMHG | DIASTOLIC BLOOD PRESSURE: 68 MMHG

## 2023-03-02 DIAGNOSIS — Z82.79 FHX: CONGENITAL ANOMALY: ICD-10-CM

## 2023-03-02 DIAGNOSIS — Z3A.09 9 WEEKS GESTATION OF PREGNANCY: Primary | ICD-10-CM

## 2023-03-02 DIAGNOSIS — Z76.89 ENCOUNTER TO ESTABLISH CARE: ICD-10-CM

## 2023-03-02 DIAGNOSIS — Z13.89 SCREENING FOR HEMATURIA OR PROTEINURIA: ICD-10-CM

## 2023-03-02 DIAGNOSIS — Q27.9 CONGENITAL MALFORMATION OF PERIPHERAL VASCULAR SYSTEM, UNSPECIFIED: ICD-10-CM

## 2023-03-02 LAB
CLARITY, POC: CLEAR
COLOR UR: YELLOW
GLUCOSE UR STRIP-MCNC: NEGATIVE MG/DL
PROT UR STRIP-MCNC: NEGATIVE MG/DL

## 2023-03-02 PROCEDURE — 0501F PRENATAL FLOW SHEET: CPT | Performed by: STUDENT IN AN ORGANIZED HEALTH CARE EDUCATION/TRAINING PROGRAM

## 2023-03-02 RX ORDER — PRENATAL VIT NO.126/IRON/FOLIC 28MG-0.8MG
1 TABLET ORAL DAILY
COMMUNITY

## 2023-03-02 NOTE — PROGRESS NOTES
"[facility]   Obstetrics and Gynecology   New Obstetric Visit    3/2/2023    Patient: Mayra Garner          MR#:[id number]    History of Present Illness    Chief Complaint   Patient presents with   • Initial Prenatal Visit     New OB preg conf LMP  US today, Had a pap \"forever ago\" nml, No problems today       22 y.o. female  at 9w1d presents for NOB visit.  She is doing well today.  Taking prenatal vitamins.  Here with FOB Rashad.  They are very excited for pregnancy.    ________________________________________  Patient Active Problem List   Diagnosis   • Congenital malformation of peripheral vascular system, unspecified   • FHx: congenital anomaly   • 9 weeks gestation of pregnancy     OB History        1    Para        Term                AB        Living           SAB        IAB        Ectopic        Molar        Multiple        Live Births                    Social History:  Denies h/o gonorrhea, chlamydia, herpes, syphilis, HIV  Patient in foster system so unsure of all details of fhx.  Does know her brother passed away at birth due to cardiac malformations.  Rashad denies any fhx genetic disorders.    Past Medical History:   Diagnosis Date   • Vascular abnormality     Right Arm      Past Surgical History:   Procedure Laterality Date   • OTHER SURGICAL HISTORY Right     Arm sclerotherapy     Social History     Tobacco Use   Smoking Status Never   • Passive exposure: Never   Smokeless Tobacco Never     Family History   Adopted: Yes   Problem Relation Age of Onset   • Breast cancer Paternal Grandmother      Prior to Admission medications    Medication Sig Start Date End Date Taking? Authorizing Provider   prenatal vitamin (prenatal, CLASSIC, vitamin) tablet Take 1 tablet by mouth Daily.   Yes Provider, MD Zeke   fluticasone (Flonase) 50 MCG/ACT nasal spray 2 sprays into the nostril(s) as directed by provider Daily. 7/13/22 3/2/23  Neva Ortiz APRN " "    ________________________________________    The following portions of the patient's history were reviewed and updated as appropriate: allergies, current medications, past family history, past medical history, past social history, past surgical history and problem list.    Review of Systems   All other systems reviewed and are negative.           Objective     /68   Wt 61.2 kg (135 lb)   LMP 12/30/2022   BMI 22.47 kg/m²    BP Readings from Last 3 Encounters:   03/02/23 118/68   02/09/23 128/78   12/13/22 144/84      Wt Readings from Last 3 Encounters:   03/02/23 61.2 kg (135 lb)   02/09/23 61.7 kg (136 lb)   12/13/22 59.9 kg (132 lb)        BMI: Estimated body mass index is 22.47 kg/m² as calculated from the following:    Height as of 12/13/22: 165.1 cm (65\").    Weight as of this encounter: 61.2 kg (135 lb).    Physical Exam  Vitals and nursing note reviewed.   Constitutional:       General: She is not in acute distress.     Appearance: Normal appearance.   HENT:      Head: Normocephalic and atraumatic.   Eyes:      Extraocular Movements: Extraocular movements intact.   Cardiovascular:      Rate and Rhythm: Normal rate and regular rhythm.      Pulses: Normal pulses.      Heart sounds: No murmur heard.  Pulmonary:      Effort: Pulmonary effort is normal. No respiratory distress.      Breath sounds: Normal breath sounds.   Chest:   Breasts:     Right: Normal. No mass, nipple discharge, skin change or tenderness.      Left: Normal. No mass, nipple discharge, skin change or tenderness.   Abdominal:      General: There is no distension.      Palpations: Abdomen is soft. There is no mass.      Tenderness: There is no abdominal tenderness.   Genitourinary:     General: Normal vulva.      Labia:         Right: No rash or lesion.         Left: No rash or lesion.       Urethra: No prolapse, urethral swelling or urethral lesion.      Vagina: Normal.      Cervix: Normal.      Uterus: Normal. Enlarged (gravid).      "  Adnexa: Right adnexa normal and left adnexa normal.      Comments: Bladder: no masses or tenderness  Perineum/Anus: no masses, lesions, or skin changes  Musculoskeletal:         General: No swelling. Normal range of motion.      Cervical back: Normal range of motion.   Lymphadenopathy:      Upper Body:      Right upper body: No axillary adenopathy.      Left upper body: No axillary adenopathy.   Skin:     General: Skin is warm and dry.   Neurological:      General: No focal deficit present.      Mental Status: She is alert and oriented to person, place, and time.   Psychiatric:         Mood and Affect: Mood normal.         Behavior: Behavior normal.           Assessment:  Diagnoses and all orders for this visit:    1. 9 weeks gestation of pregnancy (Primary)  Overview:  -PNL: collect today  -Pap: collect today  -Genetics: screening options reviewed, [facility] pamphlet provided, plan for anatomy Us at 18-22 wga  -Imm: titers today, tdap > 27wga, s/p covid vaccine/booster, declines flu shot  -Contraception: discuss at future visit  -Birthplan: discuss at future visit  -Care coordination: accepts blood transfusions, no latex allergy, call schedule reviewed      Orders:  -     POC Urinalysis Dipstick  -     Urine Culture - Urine, Urine, Clean Catch  -     OB Panel With HIV  -     Varicella Zoster Antibody, IgG  -     IGP,CtNgTv,rfx Apt HPV All    2. Encounter to establish care    3. Screening for hematuria or proteinuria  -     POC Urinalysis Dipstick  -     Urine Culture - Urine, Urine, Clean Catch    4. Congenital malformation of peripheral vascular system, unspecified  Overview:  -Patient reports vascular malformation in upper right arm.  Present since birth but not genetic.  S/p 5 repairs.  Told it may change with fluctuations with hormones.  Unsure of any other details.  -Discussed plan for serial growths to ensure placental integrity and referral to [facility] for any further recommendations.      5. FHx: congenital  anomaly  Overview:  -Patient reports brother passed away at birth from cardiac malformation.  She was soon put in foster system and is unsure of any other details.  -Plan for [facility] referral for anatomy Us +/- fetal echo          Plan:  Return in about 4 weeks (around 3/30/2023) for Next f/u and lab appt in 1 wk for cfDNA/carrier screen.      Yolanda Sanz MD  3/2/2023 11:48 EST

## 2023-03-03 LAB
ABO GROUP BLD: NORMAL
BASOPHILS # BLD AUTO: 0 X10E3/UL (ref 0–0.2)
BASOPHILS NFR BLD AUTO: 0 %
BLD GP AB SCN SERPL QL: NEGATIVE
EOSINOPHIL # BLD AUTO: 0.1 X10E3/UL (ref 0–0.4)
EOSINOPHIL NFR BLD AUTO: 1 %
ERYTHROCYTE [DISTWIDTH] IN BLOOD BY AUTOMATED COUNT: 12 % (ref 11.7–15.4)
HBV SURFACE AG SERPL QL IA: NEGATIVE
HCT VFR BLD AUTO: 36.8 % (ref 34–46.6)
HCV IGG SERPL QL IA: NON REACTIVE
HGB BLD-MCNC: 12.5 G/DL (ref 11.1–15.9)
HIV 1+2 AB+HIV1 P24 AG SERPL QL IA: NON REACTIVE
IMM GRANULOCYTES # BLD AUTO: 0 X10E3/UL (ref 0–0.1)
IMM GRANULOCYTES NFR BLD AUTO: 0 %
LYMPHOCYTES # BLD AUTO: 1.9 X10E3/UL (ref 0.7–3.1)
LYMPHOCYTES NFR BLD AUTO: 29 %
MCH RBC QN AUTO: 30 PG (ref 26.6–33)
MCHC RBC AUTO-ENTMCNC: 34 G/DL (ref 31.5–35.7)
MCV RBC AUTO: 88 FL (ref 79–97)
MONOCYTES # BLD AUTO: 0.4 X10E3/UL (ref 0.1–0.9)
MONOCYTES NFR BLD AUTO: 7 %
NEUTROPHILS # BLD AUTO: 4.1 X10E3/UL (ref 1.4–7)
NEUTROPHILS NFR BLD AUTO: 63 %
PLATELET # BLD AUTO: 259 X10E3/UL (ref 150–450)
RBC # BLD AUTO: 4.17 X10E6/UL (ref 3.77–5.28)
RH BLD: POSITIVE
RPR SER QL: NON REACTIVE
RUBV IGG SERPL IA-ACNC: 2.31 INDEX
VZV IGG SER IA-ACNC: <135 INDEX
WBC # BLD AUTO: 6.4 X10E3/UL (ref 3.4–10.8)

## 2023-03-04 LAB
BACTERIA UR CULT: NO GROWTH
BACTERIA UR CULT: NORMAL

## 2023-03-06 NOTE — PROGRESS NOTES
Please call patient to let her know her OB panel was all normal.  Varicella nonimmune so we will update Varivax postpartum.    Thanks!  Yolanda Sanz MD  03/06/23 10:48 EST

## 2023-03-09 ENCOUNTER — CLINICAL SUPPORT (OUTPATIENT)
Dept: OBSTETRICS AND GYNECOLOGY | Age: 23
End: 2023-03-09
Payer: COMMERCIAL

## 2023-03-09 DIAGNOSIS — Z13.79 GENETIC TESTING: Primary | ICD-10-CM

## 2023-03-09 LAB
C TRACH RRNA CVX QL NAA+PROBE: NEGATIVE
CONV .: NORMAL
CYTOLOGIST CVX/VAG CYTO: NORMAL
CYTOLOGY CVX/VAG DOC CYTO: NORMAL
CYTOLOGY CVX/VAG DOC THIN PREP: NORMAL
DX ICD CODE: NORMAL
HIV 1 & 2 AB SER-IMP: NORMAL
N GONORRHOEA RRNA CVX QL NAA+PROBE: NEGATIVE
OTHER STN SPEC: NORMAL
STAT OF ADQ CVX/VAG CYTO-IMP: NORMAL
T VAGINALIS RRNA SPEC QL NAA+PROBE: NEGATIVE

## 2023-03-10 ENCOUNTER — TELEPHONE (OUTPATIENT)
Dept: OBSTETRICS AND GYNECOLOGY | Age: 23
End: 2023-03-10
Payer: COMMERCIAL

## 2023-03-12 RX ORDER — FLUCONAZOLE 150 MG/1
150 TABLET ORAL DAILY
Qty: 1 TABLET | Refills: 0 | Status: SHIPPED | OUTPATIENT
Start: 2023-03-12

## 2023-03-12 NOTE — PROGRESS NOTES
Please call patient: Pap smear is normal. I recommend repeating in 3 years.  It also showed the presence of a yeast infection so I have sent in a dose of Diflucan to treat this.  Thank you!    Yolanda Sanz MD  3/12/2023  13:56 EDT

## 2023-03-15 NOTE — PROGRESS NOTES
Please call patient to let her know that her genetic screening returned low risk.  Feel free to discuss gender if patient desires.  We will plan to perform the second part of her genetic testing, the screen for spina bifida, at 15 to 20 weeks gestation.Thank you,  Yolanda Sanz MD  03/15/23  13:41 EDT

## 2023-03-21 LAB
Lab: NEGATIVE
Lab: NORMAL
NTRA ALPHA-THALASSEMIA: NEGATIVE
NTRA BETA-HEMOGLOBINOPATHIES: NEGATIVE
NTRA CANAVAN DISEASE: NEGATIVE
NTRA CYSTIC FIBROSIS: NEGATIVE
NTRA DUCHENNE/BECKER MUSCULAR DYSTROPHY: NEGATIVE
NTRA FAMILIAL DYSAUTONOMIA: NEGATIVE
NTRA FRAGILE X SYNDROME: NEGATIVE
NTRA GALACTOSEMIA: NEGATIVE
NTRA GAUCHER DISEASE: NEGATIVE
NTRA MEDIUM CHAIN ACYL-COA DEHYDROGENASE DEFICIENCY: NEGATIVE
NTRA POLYCYSTIC KIDNEY DISEASE, AUTOSOMAL RECESSIVE: NEGATIVE
NTRA SMITH-LEMLI-OPITZ SYNDROME: NEGATIVE
NTRA SPINAL MUSCULAR ATROPHY: NEGATIVE
NTRA TAY-SACHS DISEASE: NEGATIVE

## 2023-03-22 NOTE — PROGRESS NOTES
Please call patient to let her know her carrier screen is normal.    Thank you,  Yolanda Sanz MD  03/22/23  09:49 EDT

## 2023-03-30 ENCOUNTER — ROUTINE PRENATAL (OUTPATIENT)
Dept: OBSTETRICS AND GYNECOLOGY | Age: 23
End: 2023-03-30
Payer: COMMERCIAL

## 2023-03-30 VITALS — WEIGHT: 137 LBS | DIASTOLIC BLOOD PRESSURE: 72 MMHG | SYSTOLIC BLOOD PRESSURE: 118 MMHG | BODY MASS INDEX: 22.8 KG/M2

## 2023-03-30 DIAGNOSIS — Z13.89 SCREENING FOR HEMATURIA OR PROTEINURIA: ICD-10-CM

## 2023-03-30 DIAGNOSIS — Q27.9 CONGENITAL MALFORMATION OF PERIPHERAL VASCULAR SYSTEM, UNSPECIFIED: ICD-10-CM

## 2023-03-30 DIAGNOSIS — O09.899 MATERNAL VARICELLA, NON-IMMUNE: ICD-10-CM

## 2023-03-30 DIAGNOSIS — O26.891 PREGNANCY HEADACHE IN FIRST TRIMESTER: ICD-10-CM

## 2023-03-30 DIAGNOSIS — Z82.79 FHX: CONGENITAL ANOMALY: ICD-10-CM

## 2023-03-30 DIAGNOSIS — Z28.39 MATERNAL VARICELLA, NON-IMMUNE: ICD-10-CM

## 2023-03-30 DIAGNOSIS — R51.9 PREGNANCY HEADACHE IN FIRST TRIMESTER: ICD-10-CM

## 2023-03-30 DIAGNOSIS — Z3A.13 13 WEEKS GESTATION OF PREGNANCY: Primary | ICD-10-CM

## 2023-03-30 RX ORDER — MAGNESIUM OXIDE 400 MG/1
400 TABLET ORAL 3 TIMES DAILY
Qty: 15 TABLET | Refills: 0 | Status: SHIPPED | OUTPATIENT
Start: 2023-03-30 | End: 2023-04-04

## 2023-03-30 NOTE — PROGRESS NOTES
Mayra Garner, a 22 y.o.  at 13w1d, presents for OB follow-up.  She is doing well today.  Denies VB and pain.  Having persistent headache for last few days.    Objective  BP Readings from Last 3 Encounters:   23 118/72   23 118/68   23 128/78      Wt Readings from Last 3 Encounters:   23 62.1 kg (137 lb)   23 61.2 kg (135 lb)   23 61.7 kg (136 lb)      Total weight gain this pregnancy: 0.907 kg (2 lb)    General: Awake, alert, no apparent distress  Respiratory: No increased work of breathing  Abdomen: Fundus soft and nontender  Extremity: Nontender, no edema    A/P  Diagnoses and all orders for this visit:    1. 13 weeks gestation of pregnancy (Primary)  Overview:  -PNL: plan for 1hr GTT/CBC at 24-28wga  -Pap: NIL 3/2/23  -Genetics: cfDNA/carrier screen neg, plan for msAFP at next visit, plan for anatomy Us with M  -Imm: RI, VNI, tdap > 27wga, s/p covid vaccine/booster, declines flu shot  -Contraception: discuss at future visit  -Birthplan: discuss at future visit  -Care coordination: accepts blood transfusions, no latex allergy, call schedule reviewed      Orders:  -     [facility]  -     POC Urinalysis Dipstick    2. FHx: congenital anomaly  Overview:  -Patient reports brother passed away at birth from cardiac malformation.  She was soon put in foster system and is unsure of any other details.  -Plan for [facility] referral for anatomy Us +/- fetal echo, ordered    Orders:  -     [facility]    3. Congenital malformation of peripheral vascular system, unspecified  Overview:  -Patient reports vascular malformation in upper right arm.  Present since birth but not genetic.  S/p 5 repairs.  Told it may change with fluctuations with hormones.  Unsure of any other details.  -Discussed plan for serial growths to ensure placental integrity and referral to [facility] for any further recommendations.    Orders:  -     [facility]    4. Maternal varicella, non-immune  Overview:  H/o vaccine      5. Screening for hematuria or proteinuria  -     POC Urinalysis Dipstick    6. Pregnancy headache in first trimester  Overview:  -Persistent headache the last few days despite rest and hydration, no consistent URI symptoms  -Rx mag ox sent, also can use tylenol prn      Other orders  -     magnesium oxide (MAG-OX) 400 MG tablet; Take 1 tablet by mouth 3 (Three) Times a Day for 5 days.  Dispense: 15 tablet; Refill: 0      SAB precautions reviewed  Return in about 5 weeks (around 5/4/2023) for Next f/u.    Yolanda Sanz MD

## 2023-04-11 ENCOUNTER — HOSPITAL ENCOUNTER (EMERGENCY)
Facility: HOSPITAL | Age: 23
Discharge: HOME | End: 2023-04-12
Attending: EMERGENCY MEDICINE | Admitting: EMERGENCY MEDICINE
Payer: COMMERCIAL

## 2023-04-11 ENCOUNTER — APPOINTMENT (OUTPATIENT)
Dept: ULTRASOUND IMAGING | Facility: HOSPITAL | Age: 23
End: 2023-04-11
Payer: COMMERCIAL

## 2023-04-11 DIAGNOSIS — Z3A.14 14 WEEKS GESTATION OF PREGNANCY: ICD-10-CM

## 2023-04-11 DIAGNOSIS — O26.892 ABDOMINAL PAIN DURING PREGNANCY IN SECOND TRIMESTER: Primary | ICD-10-CM

## 2023-04-11 DIAGNOSIS — R10.9 ABDOMINAL PAIN DURING PREGNANCY IN SECOND TRIMESTER: Primary | ICD-10-CM

## 2023-04-11 LAB
ALBUMIN SERPL-MCNC: 3.9 G/DL (ref 3.5–5.2)
ALBUMIN/GLOB SERPL: 1.4 G/DL
ALP SERPL-CCNC: 46 U/L (ref 39–117)
ALT SERPL W P-5'-P-CCNC: 14 U/L (ref 1–33)
ANION GAP SERPL CALCULATED.3IONS-SCNC: 9 MMOL/L (ref 5–15)
AST SERPL-CCNC: 17 U/L (ref 1–32)
BASOPHILS # BLD AUTO: 0.03 10*3/MM3 (ref 0–0.2)
BASOPHILS NFR BLD AUTO: 0.4 % (ref 0–1.5)
BILIRUB SERPL-MCNC: 0.3 MG/DL (ref 0–1.2)
BILIRUB UR QL STRIP: NEGATIVE
BUN SERPL-MCNC: 10 MG/DL (ref 6–20)
BUN/CREAT SERPL: 18.9 (ref 7–25)
CALCIUM SPEC-SCNC: 8.6 MG/DL (ref 8.6–10.5)
CHLORIDE SERPL-SCNC: 105 MMOL/L (ref 98–107)
CLARITY UR: CLEAR
CLUE CELLS SPEC QL WET PREP: NORMAL
CO2 SERPL-SCNC: 24 MMOL/L (ref 22–29)
COLOR UR: YELLOW
CREAT SERPL-MCNC: 0.53 MG/DL (ref 0.57–1)
DEPRECATED RDW RBC AUTO: 40.5 FL (ref 37–54)
EGFRCR SERPLBLD CKD-EPI 2021: 135.1 ML/MIN/1.73
EOSINOPHIL # BLD AUTO: 0.08 10*3/MM3 (ref 0–0.4)
EOSINOPHIL NFR BLD AUTO: 1.1 % (ref 0.3–6.2)
ERYTHROCYTE [DISTWIDTH] IN BLOOD BY AUTOMATED COUNT: 12.1 % (ref 12.3–15.4)
GLOBULIN UR ELPH-MCNC: 2.7 GM/DL
GLUCOSE SERPL-MCNC: 94 MG/DL (ref 65–99)
GLUCOSE UR STRIP-MCNC: NEGATIVE MG/DL
HCG INTACT+B SERPL-ACNC: NORMAL MIU/ML
HCT VFR BLD AUTO: 36.6 % (ref 34–46.6)
HGB BLD-MCNC: 11.8 G/DL (ref 12–15.9)
HGB UR QL STRIP.AUTO: NEGATIVE
HOLD SPECIMEN: NORMAL
HOLD SPECIMEN: NORMAL
HYDATID CYST SPEC WET PREP: NORMAL
IMM GRANULOCYTES # BLD AUTO: 0.02 10*3/MM3 (ref 0–0.05)
IMM GRANULOCYTES NFR BLD AUTO: 0.3 % (ref 0–0.5)
KETONES UR QL STRIP: NEGATIVE
LEUKOCYTE ESTERASE UR QL STRIP.AUTO: NEGATIVE
LIPASE SERPL-CCNC: 26 U/L (ref 13–60)
LYMPHOCYTES # BLD AUTO: 2.74 10*3/MM3 (ref 0.7–3.1)
LYMPHOCYTES NFR BLD AUTO: 36.1 % (ref 19.6–45.3)
MCH RBC QN AUTO: 29.4 PG (ref 26.6–33)
MCHC RBC AUTO-ENTMCNC: 32.2 G/DL (ref 31.5–35.7)
MCV RBC AUTO: 91 FL (ref 79–97)
MONOCYTES # BLD AUTO: 0.51 10*3/MM3 (ref 0.1–0.9)
MONOCYTES NFR BLD AUTO: 6.7 % (ref 5–12)
NEUTROPHILS NFR BLD AUTO: 4.21 10*3/MM3 (ref 1.7–7)
NEUTROPHILS NFR BLD AUTO: 55.4 % (ref 42.7–76)
NITRITE UR QL STRIP: NEGATIVE
NRBC BLD AUTO-RTO: 0 /100 WBC (ref 0–0.2)
PH UR STRIP.AUTO: 6 [PH] (ref 5–8)
PLATELET # BLD AUTO: 236 10*3/MM3 (ref 140–450)
PMV BLD AUTO: 8.6 FL (ref 6–12)
POTASSIUM SERPL-SCNC: 3.8 MMOL/L (ref 3.5–5.2)
PROT SERPL-MCNC: 6.6 G/DL (ref 6–8.5)
PROT UR QL STRIP: NEGATIVE
RBC # BLD AUTO: 4.02 10*6/MM3 (ref 3.77–5.28)
SODIUM SERPL-SCNC: 138 MMOL/L (ref 136–145)
SP GR UR STRIP: 1.02 (ref 1–1.03)
T VAGINALIS SPEC QL WET PREP: NORMAL
UROBILINOGEN UR QL STRIP: NORMAL
WBC NRBC COR # BLD: 7.59 10*3/MM3 (ref 3.4–10.8)
WBC SPEC QL WET PREP: NORMAL
WHOLE BLOOD HOLD COAG: NORMAL
WHOLE BLOOD HOLD SPECIMEN: NORMAL
YEAST GENITAL QL WET PREP: NORMAL

## 2023-04-11 PROCEDURE — 85025 COMPLETE CBC W/AUTO DIFF WBC: CPT

## 2023-04-11 PROCEDURE — 87591 N.GONORRHOEAE DNA AMP PROB: CPT | Performed by: PHYSICIAN ASSISTANT

## 2023-04-11 PROCEDURE — 99284 EMERGENCY DEPT VISIT MOD MDM: CPT

## 2023-04-11 PROCEDURE — 81003 URINALYSIS AUTO W/O SCOPE: CPT

## 2023-04-11 PROCEDURE — 84702 CHORIONIC GONADOTROPIN TEST: CPT

## 2023-04-11 PROCEDURE — 83690 ASSAY OF LIPASE: CPT

## 2023-04-11 PROCEDURE — 87210 SMEAR WET MOUNT SALINE/INK: CPT | Performed by: PHYSICIAN ASSISTANT

## 2023-04-11 PROCEDURE — 87491 CHLMYD TRACH DNA AMP PROBE: CPT | Performed by: PHYSICIAN ASSISTANT

## 2023-04-11 PROCEDURE — 76805 OB US >/= 14 WKS SNGL FETUS: CPT

## 2023-04-11 PROCEDURE — 80053 COMPREHEN METABOLIC PANEL: CPT

## 2023-04-11 PROCEDURE — 36415 COLL VENOUS BLD VENIPUNCTURE: CPT

## 2023-04-11 RX ORDER — SODIUM CHLORIDE 0.9 % (FLUSH) 0.9 %
10 SYRINGE (ML) INJECTION AS NEEDED
Status: DISCONTINUED | OUTPATIENT
Start: 2023-04-11 | End: 2023-04-12 | Stop reason: HOSPADM

## 2023-04-12 VITALS
TEMPERATURE: 98.2 F | HEIGHT: 65 IN | RESPIRATION RATE: 16 BRPM | HEART RATE: 76 BPM | OXYGEN SATURATION: 98 % | SYSTOLIC BLOOD PRESSURE: 107 MMHG | BODY MASS INDEX: 22.8 KG/M2 | DIASTOLIC BLOOD PRESSURE: 72 MMHG

## 2023-04-12 NOTE — ED PROVIDER NOTES
"MD ATTESTATION NOTE    The KAELYN and I have discussed this patient's history, physical exam, and treatment plan.  I have reviewed the documentation and personally had a face to face interaction with the patient. I affirm the documentation and agree with the treatment and plan.  The attached note describes my personal findings.    I provided a substantive portion of the care of this patient. I personally performed the physical exam, in its entirety.    History  22-year-old female who is roughly 14 weeks pregnant presents with lower abdominal cramping that began earlier today.    Physical Exam  Vital Signs reviewed  GENERAL: Alert and pleasant female in no obvious distress.  Triage vitals reviewed and are unremarkable.  HENT: nares patent  EYES: no scleral icterus  CV: regular rhythm, regular rate  RESPIRATORY: normal effort  ABDOMEN: soft, nontender to palpation  MUSCULOSKELETAL: no deformity  NEURO: Strength sensation and coordination are grossly intact.  Speech and mentation are unremarkable  SKIN: warm, dry      Disposition  I discussed treatment and evaluation this patient with ERNA Ibrahim.  Labs including CBC and urinalysis are unremarkable.  Pregnancy ultrasound also unremarkable.  I do not see any \"red flags\" to suggest serious infection or signs of miscarriage at this time.  Will discharge home with supportive care and outpatient follow-up with her obstetrician.       Jose Enrique Loomis MD  04/11/23 5420    "

## 2023-04-12 NOTE — ED PROVIDER NOTES
EMERGENCY DEPARTMENT ENCOUNTER    Room Number:  08/08  Date of encounter:  4/11/2023  PCP: Neva Ortiz APRN  Historian: Patient  Full history not obtainable due to: None    HPI:  Chief Complaint: Abdominal pain    Context: Mayra Garner is a 22 y.o. female who presents to the ED c/o lower abdominal cramping discomfort that began earlier this evening.  Around 7:00 this evening she had just finished urinating when she wiped herself and noticed a whitish-green mucus on the toilet paper.  She called her OB/GYN to report this new discharge and when she complained of some discomfort in her lateral abdomen she was encouraged to come to the ER for further evaluation.  She is 14 weeks 6 days pregnant at this time with her first pregnancy.  She has had a confirmed IUP with her OB/GYN.  Her symptoms are well-controlled currently.  She has had some nausea throughout the pregnancy and vomited twice earlier today.  She denies vaginal bleeding.      MEDICAL RECORD REVIEW:    Upon review of the medical record it appears the patient was evaluated in the office with family medicine on 2/29/2023 for first trimester pregnancy.  No changes to medications or interventions at that time.  She had a normal urine dipstick on 3/2/2023 and a normal urine dipstick on 3/30/2023.    PAST MEDICAL HISTORY    Active Ambulatory Problems     Diagnosis Date Noted   • Congenital malformation of peripheral vascular system, unspecified 12/01/2015   • FHx: congenital anomaly 03/02/2023   • 13 weeks gestation of pregnancy 03/02/2023   • Maternal varicella, non-immune 03/30/2023   • Pregnancy headache in first trimester 03/30/2023     Resolved Ambulatory Problems     Diagnosis Date Noted   • Malaise and fatigue 01/28/2022   • Adenopathy 01/28/2022   • Pain 03/09/2016     Past Medical History:   Diagnosis Date   • Vascular abnormality          PAST SURGICAL HISTORY  Past Surgical History:   Procedure Laterality Date   • OTHER SURGICAL HISTORY Right      Arm sclerotherapy         FAMILY HISTORY  Family History   Adopted: Yes   Problem Relation Age of Onset   • Breast cancer Paternal Grandmother          SOCIAL HISTORY  Social History     Socioeconomic History   • Marital status: Single   Tobacco Use   • Smoking status: Never     Passive exposure: Never   • Smokeless tobacco: Never   Vaping Use   • Vaping Use: Never used   Substance and Sexual Activity   • Alcohol use: Not Currently   • Drug use: Not Currently     Types: Marijuana   • Sexual activity: Yes     Partners: Male     Birth control/protection: None         ALLERGIES  Shellfish-derived products        REVIEW OF SYSTEMS    All systems reviewed and marked as negative except as listed in HPI     PHYSICAL EXAM    I have reviewed the triage vital signs and nursing notes.    ED Triage Vitals   Temp Heart Rate Resp BP SpO2   04/11/23 2053 04/11/23 2053 04/11/23 2100 04/11/23 2100 04/11/23 2053   98.2 °F (36.8 °C) 88 16 114/78 99 %      Temp src Heart Rate Source Patient Position BP Location FiO2 (%)   04/11/23 2053 04/11/23 2053 04/11/23 2100 04/11/23 2100 --   Tympanic Monitor Sitting Left arm        Physical Exam  Constitutional:       General: She is not in acute distress.     Appearance: She is well-developed.   HENT:      Head: Normocephalic and atraumatic.   Eyes:      General: No scleral icterus.     Conjunctiva/sclera: Conjunctivae normal.   Neck:      Trachea: No tracheal deviation.   Cardiovascular:      Rate and Rhythm: Normal rate and regular rhythm.   Pulmonary:      Effort: Pulmonary effort is normal.      Breath sounds: Normal breath sounds.   Abdominal:      Palpations: Abdomen is soft.      Comments: Gravid abdomen.  Very mild abdominal tenderness to the lower abdomen.   Genitourinary:     Comments: Cervical os closed. No apparent vaginitis or cervicitis. Small amount of white discharge. No blood in the vaginal vault. No CMT. No lesions noted on the vulva or cervix. No adnexal masses or  tenderness. SUHA Saunders chaperoned the exam.   Musculoskeletal:         General: No deformity.      Cervical back: Normal range of motion.   Lymphadenopathy:      Cervical: No cervical adenopathy.   Skin:     General: Skin is warm and dry.   Neurological:      Mental Status: She is alert and oriented to person, place, and time.   Psychiatric:         Behavior: Behavior normal.         Vital signs and nursing notes reviewed.            LAB RESULTS  Recent Results (from the past 24 hour(s))   Comprehensive Metabolic Panel    Collection Time: 04/11/23  9:07 PM    Specimen: Arm, Left; Blood   Result Value Ref Range    Glucose 94 65 - 99 mg/dL    BUN 10 6 - 20 mg/dL    Creatinine 0.53 (L) 0.57 - 1.00 mg/dL    Sodium 138 136 - 145 mmol/L    Potassium 3.8 3.5 - 5.2 mmol/L    Chloride 105 98 - 107 mmol/L    CO2 24.0 22.0 - 29.0 mmol/L    Calcium 8.6 8.6 - 10.5 mg/dL    Total Protein 6.6 6.0 - 8.5 g/dL    Albumin 3.9 3.5 - 5.2 g/dL    ALT (SGPT) 14 1 - 33 U/L    AST (SGOT) 17 1 - 32 U/L    Alkaline Phosphatase 46 39 - 117 U/L    Total Bilirubin 0.3 0.0 - 1.2 mg/dL    Globulin 2.7 gm/dL    A/G Ratio 1.4 g/dL    BUN/Creatinine Ratio 18.9 7.0 - 25.0    Anion Gap 9.0 5.0 - 15.0 mmol/L    eGFR 135.1 >60.0 mL/min/1.73   Lipase    Collection Time: 04/11/23  9:07 PM    Specimen: Arm, Left; Blood   Result Value Ref Range    Lipase 26 13 - 60 U/L   hCG, Quantitative, Pregnancy    Collection Time: 04/11/23  9:07 PM    Specimen: Arm, Left; Blood   Result Value Ref Range    HCG Quantitative 58,309.00 mIU/mL   Green Top (Gel)    Collection Time: 04/11/23  9:07 PM   Result Value Ref Range    Extra Tube Hold for add-ons.    Lavender Top    Collection Time: 04/11/23  9:07 PM   Result Value Ref Range    Extra Tube hold for add-on    Gold Top - SST    Collection Time: 04/11/23  9:07 PM   Result Value Ref Range    Extra Tube Hold for add-ons.    Light Blue Top    Collection Time: 04/11/23  9:07 PM   Result Value Ref Range    Extra Tube Hold for  add-ons.    CBC Auto Differential    Collection Time: 04/11/23  9:07 PM    Specimen: Arm, Left; Blood   Result Value Ref Range    WBC 7.59 3.40 - 10.80 10*3/mm3    RBC 4.02 3.77 - 5.28 10*6/mm3    Hemoglobin 11.8 (L) 12.0 - 15.9 g/dL    Hematocrit 36.6 34.0 - 46.6 %    MCV 91.0 79.0 - 97.0 fL    MCH 29.4 26.6 - 33.0 pg    MCHC 32.2 31.5 - 35.7 g/dL    RDW 12.1 (L) 12.3 - 15.4 %    RDW-SD 40.5 37.0 - 54.0 fl    MPV 8.6 6.0 - 12.0 fL    Platelets 236 140 - 450 10*3/mm3    Neutrophil % 55.4 42.7 - 76.0 %    Lymphocyte % 36.1 19.6 - 45.3 %    Monocyte % 6.7 5.0 - 12.0 %    Eosinophil % 1.1 0.3 - 6.2 %    Basophil % 0.4 0.0 - 1.5 %    Immature Grans % 0.3 0.0 - 0.5 %    Neutrophils, Absolute 4.21 1.70 - 7.00 10*3/mm3    Lymphocytes, Absolute 2.74 0.70 - 3.10 10*3/mm3    Monocytes, Absolute 0.51 0.10 - 0.90 10*3/mm3    Eosinophils, Absolute 0.08 0.00 - 0.40 10*3/mm3    Basophils, Absolute 0.03 0.00 - 0.20 10*3/mm3    Immature Grans, Absolute 0.02 0.00 - 0.05 10*3/mm3    nRBC 0.0 0.0 - 0.2 /100 WBC   Urinalysis With Microscopic If Indicated (No Culture) - Urine, Clean Catch    Collection Time: 04/11/23  9:55 PM    Specimen: Urine, Clean Catch   Result Value Ref Range    Color, UA Yellow Yellow, Straw    Appearance, UA Clear Clear    pH, UA 6.0 5.0 - 8.0    Specific Gravity, UA 1.023 1.005 - 1.030    Glucose, UA Negative Negative    Ketones, UA Negative Negative    Bilirubin, UA Negative Negative    Blood, UA Negative Negative    Protein, UA Negative Negative    Leuk Esterase, UA Negative Negative    Nitrite, UA Negative Negative    Urobilinogen, UA 1.0 E.U./dL 0.2 - 1.0 E.U./dL   Wet Prep, Genital - Swab, Vagina    Collection Time: 04/11/23 11:14 PM    Specimen: Vagina; Swab   Result Value Ref Range    YEAST No yeast seen No yeast seen    HYPHAL ELEMENTS No Hyphal elements seen No Hyphal elements seen    WBC'S No WBC's seen No WBC's seen    Clue Cells, Wet Prep No Clue cells seen No Clue cells seen    Trichomonas, Wet Prep  No Trichomonas seen No Trichomonas seen       Ordered the above labs and independently reviewed the results.        RADIOLOGY  US Ob 14 + Weeks Single or First Gestation    Result Date: 4/11/2023  Patient: CHUY PULIDO  Time Out: 23:19 Exam(s): US OB 2nd TRIMESTER EXAM:   US Second or Third Trimester Pregnancy, Transabdominal CLINICAL HISTORY:    Reason for exam: Lower abdominal pain, vaginal discharge, 14 weeks 6 days pregnant. TECHNIQUE:   Real-time transabdominal obstetrical ultrasound of the maternal pelvis and a second or third trimester pregnancy with image documentation. COMPARISON:   No relevant prior studies available. FINDINGS:   Fetus:   Heart rate:  IUP with cardiac activity 167 bpm.  Estimated gestational age 14 weeks 6 days.  EDC October 4, 2023.   Presentation:  Cephalic fetal presentation.     Amniotic fluid: Qualitatively normal.     MATERNAL:   Uterus:  Unremarkable.  No myometrial mass.   Cervix:  Cervix is long and closed.   Free fluid:  No free fluid. IMPRESSION:     IUP with cardiac activity.     Electronically signed by Gurjit Wilson MD on 04-11-23 at 2319      I ordered the above noted radiological studies. Independently reviewed by me and discussed with radiologist.  See dictation above for official radiology interpretation.      PROCEDURES    Procedures        MEDICATIONS GIVEN IN ER    Medications   sodium chloride 0.9 % flush 10 mL (has no administration in time range)         PROGRESS, DATA ANALYSIS, CONSULTS, AND MEDICAL DECISION MAKING    All labs have been independently interpreted by me.  All radiology studies have been interpreted by me.  Discussion below represents my analysis of pertinent findings related to patient's condition, differential diagnosis, treatment plan and final disposition.    Patient presentation and work-up consistent with abdominal pain in pregnancy.  She has a reassuring work-up in the ER and her symptoms are well controlled.  She tolerates p.o.  intake.  She has good follow-up with her OB/GYN and plans to see them in the next several days.  All questions answered.  Close return precautions given at this time.    - Chronic or social conditions impacting care: Pregnant      DIFFERENTIAL DIAGNOSIS INCLUDE BUT NOT LIMITED TO:     Differential diagnosis includes but is not limited to:  - Hepatobiliary pathology such as cholecystitis, cholangitis, and symptomatic cholelithiasis  - Pancreatitis  - Dyspepsia  - Small bowel obstruction  - Appendicitis  - Diverticulitis  - UTI including pyelonephritis  - Ureteral stone  - Zoster  - Colitis, including infectious and ischemic  - Atypical ACS      Orders placed during this visit:  Orders Placed This Encounter   Procedures   • Chlamydia trachomatis, Neisseria gonorrhoeae, PCR - Swab, Cervix   • Wet Prep, Genital - Swab, Vagina   • US Ob 14 + Weeks Single or First Gestation   • [place] Draw   • Comprehensive Metabolic Panel   • Lipase   • Urinalysis With Microscopic If Indicated (No Culture) - Urine, Clean Catch   • hCG, Quantitative, Pregnancy   • CBC Auto Differential   • [place] Draw   • NPO Diet NPO Type: Strict NPO   • Undress & Gown   • Set Up For Pelvic Exam   • Insert Peripheral IV   • CBC & Differential   • Green Top (Gel)   • Lavender Top   • Gold Top - SST   • Light Blue Top   • Green Top (Gel)   • Lavender Top   • Gold Top - SST   • Light Blue Top         ED Course as of 04/11/23 2340   Tue Apr 11, 2023   2313 HCG Quantitative: 58,309.00 [DC]      ED Course User Index  [DC] Jose Enrique Ibrahim PA       AS OF 23:40 EDT VITALS:    BP - 114/78  HR - 88  TEMP - 98.2 °F (36.8 °C) (Tympanic)  02 SATS - 99%    1140 I rechecked the patient.  I discussed the patient's labs, radiology findings (including all incidental findings), diagnosis, and plan for discharge.  A repeat exam reveals no new worrisome changes from my initial exam findings.  The patient understands that the fact that they are being discharged does not  denote that nothing is abnormal, it indicates that no clinical emergency is present and that they must follow-up as directed in order to properly maintain their health.  Follow-up instructions (specifically listed below) and return to ER precautions were given at this time.  I specifically instructed the patient to follow-up with their PCP.  The patient understands and agrees with the plan, and is ready for d/c.  All questions answered.        DIAGNOSIS  Final diagnoses:   Abdominal pain during pregnancy in second trimester   14 weeks gestation of pregnancy         DISPOSITION  D/c    Pt masked in first look. I wore a surgical mask throughout my encounters with the pt. I performed hand hygiene on entry into the pt room and upon exit.     Dictated utilizing Dragon dictation     Note Disclaimer: At [facility], we believe that sharing information builds trust and better relationships. You are receiving this note because you recently visited [facility]. It is possible you will see health information before a provider has talked with you about it. This kind of information can be easy to misunderstand. To help you fully understand what it means for your health, we urge you to discuss this note with your provider.      Jose Enrique Ibrahim PA  04/16/23 [id number]

## 2023-04-12 NOTE — ED TRIAGE NOTES
"Patient to ED per PV from work, ambulatory to triage w/ reports of abd pressure and cramping this evening. Patient denies vaginal bleeding, states at approx 1900 she wiped after using the restroom and long, thin \"whitish/greenish\" mucus was on the toilet paper. Patient spoke with OBGYN, Dr. Zuniga, was directed to come to hospital for treatment. Patient reports vomiting twice today.  "

## 2023-04-13 LAB
C TRACH RRNA SPEC QL NAA+PROBE: NEGATIVE
N GONORRHOEA RRNA SPEC QL NAA+PROBE: NEGATIVE

## 2023-04-25 ENCOUNTER — HOSPITAL ENCOUNTER (EMERGENCY)
Facility: HOSPITAL | Age: 23
Discharge: HOME | End: 2023-04-25
Attending: STUDENT IN AN ORGANIZED HEALTH CARE EDUCATION/TRAINING PROGRAM | Admitting: OBSTETRICS & GYNECOLOGY
Payer: COMMERCIAL

## 2023-04-25 ENCOUNTER — TELEPHONE (OUTPATIENT)
Dept: OBSTETRICS AND GYNECOLOGY | Age: 23
End: 2023-04-25

## 2023-04-25 VITALS
SYSTOLIC BLOOD PRESSURE: 111 MMHG | RESPIRATION RATE: 16 BRPM | HEART RATE: 95 BPM | WEIGHT: 137.2 LBS | TEMPERATURE: 98.5 F | HEIGHT: 65 IN | BODY MASS INDEX: 22.86 KG/M2 | DIASTOLIC BLOOD PRESSURE: 73 MMHG

## 2023-04-25 LAB
ALBUMIN SERPL-MCNC: 3.4 G/DL (ref 3.5–5.2)
ALBUMIN/GLOB SERPL: 1.3 G/DL
ALP SERPL-CCNC: 46 U/L (ref 39–117)
ALT SERPL W P-5'-P-CCNC: 16 U/L (ref 1–33)
ANION GAP SERPL CALCULATED.3IONS-SCNC: 12.6 MMOL/L (ref 5–15)
AST SERPL-CCNC: 23 U/L (ref 1–32)
BACTERIA UR QL AUTO: ABNORMAL /HPF
BASOPHILS # BLD AUTO: 0.03 10*3/MM3 (ref 0–0.2)
BASOPHILS NFR BLD AUTO: 0.3 % (ref 0–1.5)
BILIRUB SERPL-MCNC: 0.7 MG/DL (ref 0–1.2)
BILIRUB UR QL STRIP: NEGATIVE
BUN SERPL-MCNC: 12 MG/DL (ref 6–20)
BUN/CREAT SERPL: 21.1 (ref 7–25)
CALCIUM SPEC-SCNC: 8.5 MG/DL (ref 8.6–10.5)
CHLORIDE SERPL-SCNC: 102 MMOL/L (ref 98–107)
CLARITY UR: CLEAR
CO2 SERPL-SCNC: 18.4 MMOL/L (ref 22–29)
COLOR UR: ABNORMAL
CREAT SERPL-MCNC: 0.57 MG/DL (ref 0.57–1)
DEPRECATED RDW RBC AUTO: 40.6 FL (ref 37–54)
EGFRCR SERPLBLD CKD-EPI 2021: 132.8 ML/MIN/1.73
EOSINOPHIL # BLD AUTO: 0.01 10*3/MM3 (ref 0–0.4)
EOSINOPHIL NFR BLD AUTO: 0.1 % (ref 0.3–6.2)
ERYTHROCYTE [DISTWIDTH] IN BLOOD BY AUTOMATED COUNT: 12.4 % (ref 12.3–15.4)
GLOBULIN UR ELPH-MCNC: 2.7 GM/DL
GLUCOSE SERPL-MCNC: 104 MG/DL (ref 65–99)
GLUCOSE UR STRIP-MCNC: NEGATIVE MG/DL
HCT VFR BLD AUTO: 36.3 % (ref 34–46.6)
HGB BLD-MCNC: 12.3 G/DL (ref 12–15.9)
HGB UR QL STRIP.AUTO: NEGATIVE
HYALINE CASTS UR QL AUTO: ABNORMAL /LPF
IMM GRANULOCYTES # BLD AUTO: 0.04 10*3/MM3 (ref 0–0.05)
IMM GRANULOCYTES NFR BLD AUTO: 0.4 % (ref 0–0.5)
KETONES UR QL STRIP: ABNORMAL
LEUKOCYTE ESTERASE UR QL STRIP.AUTO: ABNORMAL
LIPASE SERPL-CCNC: 23 U/L (ref 13–60)
LYMPHOCYTES # BLD AUTO: 0.42 10*3/MM3 (ref 0.7–3.1)
LYMPHOCYTES NFR BLD AUTO: 4 % (ref 19.6–45.3)
MCH RBC QN AUTO: 30.2 PG (ref 26.6–33)
MCHC RBC AUTO-ENTMCNC: 33.9 G/DL (ref 31.5–35.7)
MCV RBC AUTO: 89.2 FL (ref 79–97)
MONOCYTES # BLD AUTO: 0.29 10*3/MM3 (ref 0.1–0.9)
MONOCYTES NFR BLD AUTO: 2.8 % (ref 5–12)
NEUTROPHILS NFR BLD AUTO: 9.62 10*3/MM3 (ref 1.7–7)
NEUTROPHILS NFR BLD AUTO: 92.4 % (ref 42.7–76)
NITRITE UR QL STRIP: NEGATIVE
NRBC BLD AUTO-RTO: 0 /100 WBC (ref 0–0.2)
PH UR STRIP.AUTO: 5.5 [PH] (ref 5–8)
PLATELET # BLD AUTO: 193 10*3/MM3 (ref 140–450)
PMV BLD AUTO: 9 FL (ref 6–12)
POTASSIUM SERPL-SCNC: 3.7 MMOL/L (ref 3.5–5.2)
PROT SERPL-MCNC: 6.1 G/DL (ref 6–8.5)
PROT UR QL STRIP: ABNORMAL
RBC # BLD AUTO: 4.07 10*6/MM3 (ref 3.77–5.28)
RBC # UR STRIP: ABNORMAL /HPF
REF LAB TEST METHOD: ABNORMAL
SODIUM SERPL-SCNC: 133 MMOL/L (ref 136–145)
SP GR UR STRIP: >=1.03 (ref 1–1.03)
SQUAMOUS #/AREA URNS HPF: ABNORMAL /HPF
UROBILINOGEN UR QL STRIP: ABNORMAL
WBC # UR STRIP: ABNORMAL /HPF
WBC NRBC COR # BLD: 10.41 10*3/MM3 (ref 3.4–10.8)

## 2023-04-25 PROCEDURE — 96374 THER/PROPH/DIAG INJ IV PUSH: CPT | Performed by: OBSTETRICS & GYNECOLOGY

## 2023-04-25 PROCEDURE — 99282 EMERGENCY DEPT VISIT SF MDM: CPT | Performed by: OBSTETRICS & GYNECOLOGY

## 2023-04-25 PROCEDURE — 85025 COMPLETE CBC W/AUTO DIFF WBC: CPT | Performed by: OBSTETRICS & GYNECOLOGY

## 2023-04-25 PROCEDURE — 80053 COMPREHEN METABOLIC PANEL: CPT | Performed by: OBSTETRICS & GYNECOLOGY

## 2023-04-25 PROCEDURE — 87086 URINE CULTURE/COLONY COUNT: CPT | Performed by: OBSTETRICS & GYNECOLOGY

## 2023-04-25 PROCEDURE — 81001 URINALYSIS AUTO W/SCOPE: CPT | Performed by: OBSTETRICS & GYNECOLOGY

## 2023-04-25 PROCEDURE — 25010000002 ONDANSETRON PER 1 MG: Performed by: OBSTETRICS & GYNECOLOGY

## 2023-04-25 PROCEDURE — 83690 ASSAY OF LIPASE: CPT | Performed by: OBSTETRICS & GYNECOLOGY

## 2023-04-25 RX ORDER — SODIUM CHLORIDE 0.9 % (FLUSH) 0.9 %
10 SYRINGE (ML) INJECTION AS NEEDED
Status: DISCONTINUED | OUTPATIENT
Start: 2023-04-25 | End: 2023-04-25 | Stop reason: HOSPADM

## 2023-04-25 RX ORDER — ONDANSETRON 2 MG/ML
4 INJECTION INTRAMUSCULAR; INTRAVENOUS EVERY 6 HOURS PRN
Status: DISCONTINUED | OUTPATIENT
Start: 2023-04-25 | End: 2023-04-25 | Stop reason: HOSPADM

## 2023-04-25 RX ORDER — ONDANSETRON 4 MG/1
4 TABLET, ORALLY DISINTEGRATING ORAL EVERY 8 HOURS PRN
Qty: 10 TABLET | Refills: 0 | Status: SHIPPED | OUTPATIENT
Start: 2023-04-25

## 2023-04-25 RX ORDER — SODIUM CHLORIDE 0.9 % (FLUSH) 0.9 %
10 SYRINGE (ML) INJECTION EVERY 12 HOURS SCHEDULED
Status: DISCONTINUED | OUTPATIENT
Start: 2023-04-25 | End: 2023-04-25 | Stop reason: HOSPADM

## 2023-04-25 RX ADMIN — SODIUM CHLORIDE, POTASSIUM CHLORIDE, SODIUM LACTATE AND CALCIUM CHLORIDE 1000 ML: 600; 310; 30; 20 INJECTION, SOLUTION INTRAVENOUS at 11:14

## 2023-04-25 RX ADMIN — ONDANSETRON 4 MG: 2 INJECTION INTRAMUSCULAR; INTRAVENOUS at 11:24

## 2023-04-25 NOTE — OBED NOTES
[facility] Provider Note        2023 11:10 EDT    Mayra Garner is a 22 y.o.  at 16w6d LATRICIA  10/4/2023, Date entered prior to episode creation who presents for : Nausea and Vomiting ([facility] - pt states she started feeling nauseous approx 2100 last night. She began vomiting every hour from 1187-2241. She has not vomited since, but still feels very nauseous. She was able to keep down a small amount of food and fluid this morning.)          HPI: Patient begain having vomiting every hour between midnight and about 0730.  At that point she had dry heaves.  She reports eating out last night but nothing out of the ordinary.  She did have some vomiting when she went to the ER about two weeks ago but she denies having issues with vomiting during this pregnancy.  She denies diarrhea.  She has some mild nasal congestion, denies sore throat or cough.  She denies uterine cramping or vaginal bleeding.      denies ROM  deniescontractions, cramping   denies bleeding    Prenatal care with Yolanda Sanz MD     Patient Active Problem List    Diagnosis    • Maternal varicella, non-immune [O09.899, Z28.39]    • Pregnancy headache in first trimester [O26.891, R51.9]    • FHx: congenital anomaly [Z82.79]    • 13 weeks gestation of pregnancy [Z3A.13]    • Congenital malformation of peripheral vascular system, unspecified [Q27.9]          POB:   OB History    Para Term  AB Living   1 0 0 0 0 0   SAB IAB Ectopic Molar Multiple Live Births   0 0 0 0 0 0      # Outcome Date GA Lbr Guido/2nd Weight Sex Delivery Anes PTL Lv   1 Current               PMH:   Past Medical History:   Diagnosis Date   • Vascular abnormality     Right Arm      PSH:   Past Surgical History:   Procedure Laterality Date   • OTHER SURGICAL HISTORY Right     Arm sclerotherapy     FH:    Family History   Adopted: Yes   Problem Relation Age of Onset   • Breast cancer Paternal Grandmother      SH:   Social History     Tobacco Use   •  "Smoking status: Never     Passive exposure: Never   • Smokeless tobacco: Never   Vaping Use   • Vaping Use: Never used   Substance Use Topics   • Alcohol use: Not Currently   • Drug use: Not Currently     Types: Marijuana       Allergies: Shellfish-derived products    Medications:   Medications Prior to Admission   Medication Sig Dispense Refill Last Dose   • prenatal vitamin (prenatal, CLASSIC, vitamin) tablet Take 1 tablet by mouth Daily.      • fluconazole (Diflucan) 150 MG tablet Take 1 tablet by mouth Daily. 1 tablet 0        Review of Systems  A 14 system review was completed and negative except for what is noted in the HPI or below  Pertinent items are noted in HPI      Physical exam    Temp:  [98.5 °F (36.9 °C)] 98.5 °F (36.9 °C)  Heart Rate:  [95] 95  Resp:  [16] 16  BP: (111)/(73) 111/73  Estimated body mass index is 22.8 kg/m² as calculated from the following:    Height as of this encounter: 165.1 cm (65\").    Weight as of 3/30/23: 62.1 kg (137 lb).    General appearance - alert, well appearing, and in no distress  Chest - clear to auscultation, no wheezes, rales or rhonchi, symmetric air entry  Heart - normal rate and regular rhythm  Abdomen - soft, nontender  Extremities - no edema, redness or tenderness in the calves or thighs           Membranes: Intact                      FHR: 140  CTX: none     U/S reviewed: not performed.       External Prenatal Results     Pregnancy Outside Results - Transcribed From Office Records - See Scanned Records For Details     Test Value Date Time    ABO  A  03/02/23 1201    Rh  Positive  03/02/23 1201    Antibody Screen  Negative  03/02/23 1201    Varicella IgG  <135 index 03/02/23 1201    Rubella  2.31 index 03/02/23 1201    Hgb  11.8 g/dL 04/11/23 2107       12.5 g/dL 03/02/23 1201    Hct  36.6 % 04/11/23 2107       36.8 % 03/02/23 1201    Glucose Fasting GTT       Glucose Tolerance Test 1 hour       Glucose Tolerance Test 3 hour       Gonorrhea (discrete)  Negative  " 04/11/23 2314       Negative  03/02/23 1144    Chlamydia (discrete)  Negative  04/11/23 2314       Negative  03/02/23 1144    RPR  Non Reactive  03/02/23 1201    VDRL       Syphilis Antibody       HBsAg  Negative  03/02/23 1201    Herpes Simplex Virus PCR       Herpes Simplex VIrus Culture       HIV  Non Reactive  03/02/23 1201    Hep C RNA Quant PCR       Hep C Antibody  Non Reactive  03/02/23 1201    AFP       Group B Strep       GBS Susceptibility to Clindamycin       GBS Susceptibility to Erythromycin       Fetal Fibronectin       Genetic Testing, Maternal Blood             Drug Screening     Test Value Date Time    Urine Drug Screen       Amphetamine Screen       Barbiturate Screen       Benzodiazepine Screen       Methadone Screen       Phencyclidine Screen       Opiates Screen       THC Screen       Cocaine Screen       Propoxyphene Screen       Buprenorphine Screen       Methamphetamine Screen       Oxycodone Screen       Tricyclic Antidepressants Screen             Legend    ^: Historical                      11:14  (4/25/23) 2 wk ago  (4/11/23) 1 mo ago  (3/2/23) 1 yr ago  (1/28/22) 2 yr ago  (11/24/20) 2 yr ago  (11/24/20)    WBC  3.40 - 10.80 10*3/mm3 10.41  7.59  6.4 R  3.6 R   5.26    RBC  3.77 - 5.28 10*6/mm3 4.07  4.02  4.17 R  4.70 R   4.68    Hemoglobin  12.0 - 15.9 g/dL 12.3  11.8 Low   12.5 R  13.2 R   13.4    Hematocrit  34.0 - 46.6 % 36.3  36.6  36.8  40.9   41.3    MCV  79.0 - 97.0 fL 89.2  91.0  88 R  87 R   88.2    MCH  26.6 - 33.0 pg 30.2  29.4  30.0  28.1   28.6    MCHC  31.5 - 35.7 g/dL 33.9  32.2  34.0  32.3   32.4    RDW  12.3 - 15.4 % 12.4  12.1 Low   12.0 R  11.8 R   11.3 Low     RDW-SD  37.0 - 54.0 fl 40.6  40.5     35.8 Low     MPV  6.0 - 12.0 fL 9.0  8.6     9.1    Platelets  140 - 450 10*3/mm3 193  236  259 R  297 R   327    Neutrophil %  42.7 - 76.0 % 92.4 High   55.4  63 R  43 R  43.8     Lymphocyte %  19.6 - 45.3 % 4.0 Low   36.1  29 R  45 R  50.0 High      Monocyte %  5.0 -  12.0 % 2.8 Low   6.7  7 R  9 R  5.2     Eosinophil %  0.3 - 6.2 % 0.1 Low   1.1  1 R  2 R  1.0     Basophil %  0.0 - 1.5 % 0.3  0.4  0 R  1 R      Immature Grans %  0.0 - 0.5 % 0.4  0.3  0 R  0 R      Neutrophils, Absolute  1.70 - 7.00 10*3/mm3 9.62 High   4.21  4.1 R  1.6 R  2.30     Lymphocytes, Absolute  0.70 - 3.10 10*3/mm3 0.42 Low   2.74  1.9 R  1.6 R  2.63     Monocytes, Absolute  0.10 - 0.90 10*3/mm3 0.29  0.51  0.4 R  0.3 R  0.27     Eosinophils, Absolute  0.00 - 0.40 10*3/mm3 0.01  0.08  0.1 R  0.1 R          Component  Ref Range & Units 11:14 2 wk ago 2 yr ago   Glucose  65 - 99 mg/dL 104 High   94  119 High     BUN  6 - 20 mg/dL 12  10  12    Creatinine  0.57 - 1.00 mg/dL 0.57  0.53 Low   0.70    Sodium  136 - 145 mmol/L 133 Low   138  139    Potassium  3.5 - 5.2 mmol/L 3.7  3.8  3.4 Low     Comment: Slight hemolysis detected by analyzer. Results may be affected.   Chloride  98 - 107 mmol/L 102  105  103    CO2  22.0 - 29.0 mmol/L 18.4 Low   24.0  26.2    Calcium  8.6 - 10.5 mg/dL 8.5 Low   8.6  9.4    Total Protein  6.0 - 8.5 g/dL 6.1  6.6  6.9    Albumin  3.5 - 5.2 g/dL 3.4 Low   3.9  4.20 R    ALT (SGPT)  1 - 33 U/L 16  14  30    AST (SGOT)  1 - 32 U/L 23  17  30            Impression:   @ 16w6d .  Final Diagnosis: nausea/vomiting    Plan:  1. CBC, CMP, UA  2. One liter IV fluid bolus, Zofran IV  3.  Risks, benefits of treatment plan have been discussed.  4.  All questions have been answered.         I discussed the patients findings and my recommendations with patient      Addendum:  Patient feeling better after IV fluids and IV Zofran. Will discharge home, has prenatal appointment in 2 days.  Advised patient to return to hospital or call office if she is not able to tolerate any oral intake.    Mary Sprague MD  2023  11:10 EDT

## 2023-04-25 NOTE — TELEPHONE ENCOUNTER
Provider: DR. WOODSON  Caller: CHUY PULIDO  Relationship to Patient: SELF  Pharmacy: [facility] [address] [id number]  Phone Number: [phone]  Reason for Call: PATIENT IS ILL  When was the patient last seen: 03.30.23  When did it start: 04.24.23    PATIENT CALLED IN STATING THAT LAST NIGHT SHE STATED THROWING UP AND IS RUNNING A LOW GRADE FEVER AND ALSO HAVING HEART BURN, PATIENT CAN BE REACHED AT THE NUMBER ABOVE. PATIENT HAS NOT TRIED TO EAT ANYTHING BECAUSE EVEN DRINKING WATER OR THE BODY ARMOR DRINKS SHE CAN'T HOLD DOWN IT STILL COMES BACK UP.    PLEASE CALL PATIENT AT THE NUMBER LISTED.    THANK YOU

## 2023-04-26 LAB — BACTERIA SPEC AEROBE CULT: NORMAL

## 2023-04-27 ENCOUNTER — TRANSCRIBE ORDERS (OUTPATIENT)
Dept: ULTRASOUND IMAGING | Facility: HOSPITAL | Age: 23
End: 2023-04-27
Payer: COMMERCIAL

## 2023-04-27 ENCOUNTER — OFFICE VISIT (OUTPATIENT)
Dept: OBSTETRICS AND GYNECOLOGY | Facility: CLINIC | Age: 23
End: 2023-04-27
Payer: COMMERCIAL

## 2023-04-27 ENCOUNTER — HOSPITAL ENCOUNTER (OUTPATIENT)
Dept: ULTRASOUND IMAGING | Facility: HOSPITAL | Age: 23
Discharge: HOME | End: 2023-04-27
Payer: COMMERCIAL

## 2023-04-27 VITALS
TEMPERATURE: 98.4 F | RESPIRATION RATE: 16 BRPM | WEIGHT: 138.4 LBS | HEART RATE: 80 BPM | SYSTOLIC BLOOD PRESSURE: 108 MMHG | BODY MASS INDEX: 23.06 KG/M2 | HEIGHT: 65 IN | DIASTOLIC BLOOD PRESSURE: 67 MMHG

## 2023-04-27 DIAGNOSIS — Z82.79 FHX: CONGENITAL ANOMALY: Primary | ICD-10-CM

## 2023-04-27 DIAGNOSIS — Q27.9 CONGENITAL MALFORMATION OF PERIPHERAL VASCULAR SYSTEM, UNSPECIFIED: ICD-10-CM

## 2023-04-27 DIAGNOSIS — Q27.9 CONGENITAL MALFORMATION OF PERIPHERAL VASCULAR SYSTEM, UNSPECIFIED: Primary | ICD-10-CM

## 2023-04-27 PROCEDURE — 76811 OB US DETAILED SNGL FETUS: CPT

## 2023-04-27 NOTE — PROGRESS NOTES
MATERNAL FETAL MEDICINE ConsultNote    Dear Dr. Sanz,     Thank you for your kind referral of Mayra Garner.  As you know, she is a 22 y.o.   at 18  2/7 weeks gestation (Estimated Date of Delivery: 10/4/23). This is a consult.      Her antepartum course is complicated by:  Hx half brother with CHD who passed away  Peripheral venous malformation    Aneuploidy Screening:  Carrier Screening:    HPI: Today, she denies headache, blurry vision, RUQ pain. No vaginal bleeding, no contractions.     Review of History:  Past Medical History:   Diagnosis Date   • Vascular abnormality     Right Arm      Past Surgical History:   Procedure Laterality Date   • OTHER SURGICAL HISTORY Right     Arm sclerotherapy       Social History     Socioeconomic History   • Marital status: Single   Tobacco Use   • Smoking status: Never     Passive exposure: Never   • Smokeless tobacco: Never   Vaping Use   • Vaping Use: Never used   Substance and Sexual Activity   • Alcohol use: Not Currently   • Drug use: Not Currently     Types: Marijuana   • Sexual activity: Yes     Partners: Male     Birth control/protection: None     Family History   Adopted: Yes   Problem Relation Age of Onset   • Breast cancer Paternal Grandmother       Allergies   Allergen Reactions   • Shellfish-Derived Products Itching      Current Outpatient Medications on File Prior to Visit   Medication Sig Dispense Refill   • ondansetron ODT (ZOFRAN-ODT) 4 MG disintegrating tablet Place 1 tablet on the tongue Every 8 (Eight) Hours As Needed for Nausea or Vomiting. 10 tablet 0   • prenatal vitamin (prenatal, CLASSIC, vitamin) tablet Take 1 tablet by mouth Daily.     • fluconazole (Diflucan) 150 MG tablet Take 1 tablet by mouth Daily. (Patient not taking: Reported on 2023) 1 tablet 0     No current facility-administered medications on file prior to visit.        Past obstetric, gynecological, medical, surgical, family and social history reviewed.  Relevant lab work and  "imaging reviewed.    Review of systems  Constitutional:  denies fever, chills, malaise.   ENT/Mouth:  denies sore throat, tinnitis  Eyes: denies vision changes/pain  CV:  denies chest pain  Respiratory:  denies cough/SOB  GI:  denies N/V, diarrhea, abdominal pain.    :   denies dysuria  Skin:  denies lesions or pruritis   Neuro:  denies weakness, focal neurologic symptoms    Vitals:    23 1046   BP: 108/67   BP Location: Right arm   Patient Position: Sitting   Pulse: 80   Resp: 16   Temp: 98.4 °F (36.9 °C)   TempSrc: Temporal   Weight: 62.8 kg (138 lb 6.4 oz)   Height: 165.1 cm (65\")       PHYSICAL EXAM   GENERAL: Not in acute distress, AAOx3, pleasant  CARDIO: regular rate and rhythm  PULM: symmetric chest rise, speaking in complete sentences without difficulty  NEURO: awake, alert and oriented to person, place, and time  ABDOMINAL: No fundal tenderness, no rebound or guarding, gravid  EXTREMITIES: no bilateral lower extremity edema/tenderness  SKIN: Warm, well-perfused      ULTRASOUND   Please view full ultrasound note on Imaging tab in ViewPoint.  Cephalic presentation.  Right lateral fundal placenta.  MVP 4.7 cm, which is normal.   g (AC 53%)  Normal appearing anatomy with suboptimal views as above, including some suboptimal heart views.    Transabdominal cervical length >3.5 cm, which is normal.      ASSESSMENT/COUNSELIN y.o. G  P  at   /7 weeks gestation (Estimated Date of Delivery: 10/4/23)    -Pregnancy  [ X ] stable  [   ] improving [  ] worsening    Diagnoses and all orders for this visit:    1. FHx: congenital anomaly (Primary)  Overview:  -Patient reports brother passed away at birth from cardiac malformation.  She was soon put in foster system and is unsure of any other details.  -Plan for [facility] referral for anatomy Us +/- fetal echo, ordered      2. Congenital malformation of peripheral vascular system, unspecified  Overview:  -Patient reports vascular malformation in upper right " arm.  Present since birth but not genetic.  S/p 5 repairs.  Told it may change with fluctuations with hormones.  Unsure of any other details.  -Discussed plan for serial growths to ensure placental integrity and referral to [facility] for any further recommendations.         Hx of CHD in half brother.  Congenital heart defects occur in approximately 1% of pregnancies.  Congenital heart defects may occur due to multifactorial influences, chromosomal abnormalities, genetic syndromes or environmental exposures.  Isolated heart defects are generally multifactorial.  The overall prognosis is dependent on the severity of the heart defect, and whether or not it is due to an underlying chromosome or genetic problem.  In a 2nd degree relative, no fetal ECHO is indicated as long as we can get all the heart views.  We will bring her back to attempt this in 1 month.  Risk of recurrence is around baseline risk of 1%.         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 She has had multiple rounds of sclerotherapy and sees [facility].  She last saw them in October 2021 and they recommended hydration, compression garments, and progesterone only contraception.      Vascular anomalies are disorders of the endothelium and surrounding cells that can affect the vasculature and involve any anatomical structure. The most common problem associated with vascular anomalies is psychological distress related to the disfigurement as well as functional defects because many lesions affect the head and neck.  Local complications (bleeding, infection, obstruction, pain, thrombosis, ulceration, and destroyed anatomic structures) and general complications (congestive heart failure, disseminated intravascular coagulation, pulmonary embolism, thrombocytopenia, and sepsis) can all be caused by vascular anomalies. Vascular  malformations are errors in the morphogenetic processes that regulate vascular development between the fourth and 10th weeks of embryonic life.     In pregnancy, these can certainly grow depending on location and could put patient at risk for bleeding.  I will have patient reach out to [facility] and get an appointment follow up ASAP now that she is pregnant.  I talked to the patient about it and she said the ones on her chest are just capillary and they said weould not cause issues.  She said they had told her previously she may need sclerotherapy after delivery as the one in her arm could grow in pregnancy. I asked her to reach out to them and let them know she is pregnant and make sure no further workup/recommendations from their perspective.  She will try to have a visit or telehealth with them before her next visit with me.     Talked on phone with [facility]--they said well-fitting compression garment she can be fitted for.  For increased pain in pregnancy, could offer Lovenox but wouldn't otherwise.  Will discuss with patient at next visit.  She is not currently having a lot of pain.      Summary of Plan  -Follow up anatomy 4 weeks  -Will follow up recs by [facility]--anticipate no further recommendations based on where malformation is located (arm)    Follow-up: 4 weeks follow up anatomy.      Thank you for the consult and opportunity to care for this patient.  Please feel free to reach out with any questions or concerns.      I spent 30 minutes caring for this patient on this date of service. This time includes time spent by me in the following activities: preparing for the visit, reviewing tests, obtaining and/or reviewing a separately obtained history, performing a medically appropriate examination and/or evaluation, counseling and educating the patient/family/caregiver and independently interpreting results and communicating that information with the  patient/family/caregiver with greater than 50% spent in counseling and coordination of care.       I spent 5 minutes on the separately reported service of US imaging not included in the time used to support the E/M service also reported today.      Amber Kerns MD [facility]  Maternal Fetal Medicine-[facility]  Office: [phone]  [email or web address]

## 2023-04-27 NOTE — LETTER
2023       No Recipients    Patient: Mayra Garner   YOB: 2000   Date of Visit: 2023       Dear Yolanda Sanz MD,    Thank you for referring Mayra Garner to me for evaluation. Below is a copy of my consult note.    If you have questions, please do not hesitate to call me. I look forward to following Mayra along with you.         Sincerely,        Amber Kerns MD      MATERNAL FETAL MEDICINE ConsultNote    Dear Dr. Sanz,     Thank you for your kind referral of Mayra Garner.  As you know, she is a 22 y.o.   at 18  2/7 weeks gestation (Estimated Date of Delivery: 10/4/23). This is a consult.      Her antepartum course is complicated by:  Hx half brother with CHD who passed away  Peripheral venous malformation    Aneuploidy Screening:  Carrier Screening:    HPI: Today, she denies headache, blurry vision, RUQ pain. No vaginal bleeding, no contractions.     Review of History:  Past Medical History:   Diagnosis Date   • Vascular abnormality     Right Arm      Past Surgical History:   Procedure Laterality Date   • OTHER SURGICAL HISTORY Right     Arm sclerotherapy       Social History     Socioeconomic History   • Marital status: Single   Tobacco Use   • Smoking status: Never     Passive exposure: Never   • Smokeless tobacco: Never   Vaping Use   • Vaping Use: Never used   Substance and Sexual Activity   • Alcohol use: Not Currently   • Drug use: Not Currently     Types: Marijuana   • Sexual activity: Yes     Partners: Male     Birth control/protection: None     Family History   Adopted: Yes   Problem Relation Age of Onset   • Breast cancer Paternal Grandmother       Allergies   Allergen Reactions   • Shellfish-Derived Products Itching      Current Outpatient Medications on File Prior to Visit   Medication Sig Dispense Refill   • ondansetron ODT (ZOFRAN-ODT) 4 MG disintegrating tablet Place 1 tablet on the tongue Every 8 (Eight) Hours As Needed for Nausea or  "Vomiting. 10 tablet 0   • prenatal vitamin (prenatal, CLASSIC, vitamin) tablet Take 1 tablet by mouth Daily.     • fluconazole (Diflucan) 150 MG tablet Take 1 tablet by mouth Daily. (Patient not taking: Reported on 2023) 1 tablet 0     No current facility-administered medications on file prior to visit.        Past obstetric, gynecological, medical, surgical, family and social history reviewed.  Relevant lab work and imaging reviewed.    Review of systems  Constitutional:  denies fever, chills, malaise.   ENT/Mouth:  denies sore throat, tinnitis  Eyes: denies vision changes/pain  CV:  denies chest pain  Respiratory:  denies cough/SOB  GI:  denies N/V, diarrhea, abdominal pain.    :   denies dysuria  Skin:  denies lesions or pruritis   Neuro:  denies weakness, focal neurologic symptoms    Vitals:    23 1046   BP: 108/67   BP Location: Right arm   Patient Position: Sitting   Pulse: 80   Resp: 16   Temp: 98.4 °F (36.9 °C)   TempSrc: Temporal   Weight: 62.8 kg (138 lb 6.4 oz)   Height: 165.1 cm (65\")       PHYSICAL EXAM   GENERAL: Not in acute distress, AAOx3, pleasant  CARDIO: regular rate and rhythm  PULM: symmetric chest rise, speaking in complete sentences without difficulty  NEURO: awake, alert and oriented to person, place, and time  ABDOMINAL: No fundal tenderness, no rebound or guarding, gravid  EXTREMITIES: no bilateral lower extremity edema/tenderness  SKIN: Warm, well-perfused      ULTRASOUND   Please view full ultrasound note on Imaging tab in ViewPoint.  Cephalic presentation.  Right lateral fundal placenta.  MVP 4.7 cm, which is normal.   g (AC 53%)  Normal appearing anatomy with suboptimal views as above, including some suboptimal heart views.    Transabdominal cervical length >3.5 cm, which is normal.      ASSESSMENT/COUNSELIN y.o. G  P  at   /7 weeks gestation (Estimated Date of Delivery: 10/4/23)    -Pregnancy  [ X ] stable  [   ] improving [  ] worsening    Diagnoses and all " orders for this visit:    1. FHx: congenital anomaly (Primary)  Overview:  -Patient reports brother passed away at birth from cardiac malformation.  She was soon put in foster system and is unsure of any other details.  -Plan for [facility] referral for anatomy Us +/- fetal echo, ordered      2. Congenital malformation of peripheral vascular system, unspecified  Overview:  -Patient reports vascular malformation in upper right arm.  Present since birth but not genetic.  S/p 5 repairs.  Told it may change with fluctuations with hormones.  Unsure of any other details.  -Discussed plan for serial growths to ensure placental integrity and referral to [facility] for any further recommendations.         Hx of CHD in half brother.  Congenital heart defects occur in approximately 1% of pregnancies.  Congenital heart defects may occur due to multifactorial influences, chromosomal abnormalities, genetic syndromes or environmental exposures.  Isolated heart defects are generally multifactorial.  The overall prognosis is dependent on the severity of the heart defect, and whether or not it is due to an underlying chromosome or genetic problem.  In a 2nd degree relative, no fetal ECHO is indicated as long as we can get all the heart views.  We will bring her back to attempt this in 1 month.  Risk of recurrence is around baseline risk of 1%.         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 She has had multiple rounds of sclerotherapy and sees [facility].  She last saw them in October 2021 and they recommended hydration, compression garments, and progesterone only contraception.      Vascular anomalies are disorders of the endothelium and surrounding cells that can affect the vasculature and involve any anatomical structure. The most common problem associated with vascular anomalies is psychological distress  related to the disfigurement as well as functional defects because many lesions affect the head and neck.  Local complications (bleeding, infection, obstruction, pain, thrombosis, ulceration, and destroyed anatomic structures) and general complications (congestive heart failure, disseminated intravascular coagulation, pulmonary embolism, thrombocytopenia, and sepsis) can all be caused by vascular anomalies. Vascular malformations are errors in the morphogenetic processes that regulate vascular development between the fourth and 10th weeks of embryonic life.     In pregnancy, these can certainly grow depending on location and could put patient at risk for bleeding.  I will have patient reach out to [facility] and get an appointment follow up ASAP now that she is pregnant.  I talked to the patient about it and she said the ones on her chest are just capillary and they said weould not cause issues.  She said they had told her previously she may need sclerotherapy after delivery as the one in her arm could grow in pregnancy. I asked her to reach out to them and let them know she is pregnant and make sure no further workup/recommendations from their perspective.  She will try to have a visit or telehealth with them before her next visit with me.     Talked on phone with [facility]--they said well-fitting compression garment she can be fitted for.  For increased pain in pregnancy, could offer Lovenox but wouldn't otherwise.  Will discuss with patient at next visit.  She is not currently having a lot of pain.      Summary of Plan  -Follow up anatomy 4 weeks  -Will follow up recs by [facility]--anticipate no further recommendations based on where malformation is located (arm)    Follow-up: 4 weeks follow up anatomy.      Thank you for the consult and opportunity to care for this patient.  Please feel free to reach out with any questions or concerns.      I spent 30 minutes caring for  this patient on this date of service. This time includes time spent by me in the following activities: preparing for the visit, reviewing tests, obtaining and/or reviewing a separately obtained history, performing a medically appropriate examination and/or evaluation, counseling and educating the patient/family/caregiver and independently interpreting results and communicating that information with the patient/family/caregiver with greater than 50% spent in counseling and coordination of care.       I spent 5 minutes on the separately reported service of US imaging not included in the time used to support the E/M service also reported today.      Amber Kerns MD FACOG  Maternal Fetal Medicine-[facility]  Office: [phone]  [email or web address]

## 2023-04-27 NOTE — LETTER
2023     Yolanda Sanz MD  [address] [facility] [address]    Patient: Mayra Garner   YOB: 2000   Date of Visit: 2023       Dear Yolanda Sanz MD,    Thank you for referring Mayra Garner to me for evaluation. Below is a copy of my consult note.    If you have questions, please do not hesitate to call me. I look forward to following Mayra along with you.         Sincerely,        Amber Kerns MD    MATERNAL FETAL MEDICINE ConsultNote    Dear Dr. Sanz,     Thank you for your kind referral of Mayra Garner.  As you know, she is a 22 y.o.   at 17  2/7 weeks gestation (Estimated Date of Delivery: 10/4/23). This is a consult.      Her antepartum course is complicated by:  Hx half brother with CHD who passed away  Peripheral venous malformation    Aneuploidy Screening:  Carrier Screening:    HPI: Today, she denies headache, blurry vision, RUQ pain. No vaginal bleeding, no contractions.     Review of History:  Past Medical History:   Diagnosis Date   • Vascular abnormality     Right Arm      Past Surgical History:   Procedure Laterality Date   • OTHER SURGICAL HISTORY Right     Arm sclerotherapy       Social History     Socioeconomic History   • Marital status: Single   Tobacco Use   • Smoking status: Never     Passive exposure: Never   • Smokeless tobacco: Never   Vaping Use   • Vaping Use: Never used   Substance and Sexual Activity   • Alcohol use: Not Currently   • Drug use: Not Currently     Types: Marijuana   • Sexual activity: Yes     Partners: Male     Birth control/protection: None     Family History   Adopted: Yes   Problem Relation Age of Onset   • Breast cancer Paternal Grandmother       Allergies   Allergen Reactions   • Shellfish-Derived Products Itching      Current Outpatient Medications on File Prior to Visit   Medication Sig Dispense Refill   • ondansetron ODT (ZOFRAN-ODT) 4 MG disintegrating tablet Place 1 tablet on the tongue Every 8  "(Eight) Hours As Needed for Nausea or Vomiting. 10 tablet 0   • prenatal vitamin (prenatal, CLASSIC, vitamin) tablet Take 1 tablet by mouth Daily.     • fluconazole (Diflucan) 150 MG tablet Take 1 tablet by mouth Daily. (Patient not taking: Reported on 2023) 1 tablet 0     No current facility-administered medications on file prior to visit.        Past obstetric, gynecological, medical, surgical, family and social history reviewed.  Relevant lab work and imaging reviewed.    Review of systems  Constitutional:  denies fever, chills, malaise.   ENT/Mouth:  denies sore throat, tinnitis  Eyes: denies vision changes/pain  CV:  denies chest pain  Respiratory:  denies cough/SOB  GI:  denies N/V, diarrhea, abdominal pain.    :   denies dysuria  Skin:  denies lesions or pruritis   Neuro:  denies weakness, focal neurologic symptoms    Vitals:    23 1046   BP: 108/67   BP Location: Right arm   Patient Position: Sitting   Pulse: 80   Resp: 16   Temp: 98.4 °F (36.9 °C)   TempSrc: Temporal   Weight: 62.8 kg (138 lb 6.4 oz)   Height: 165.1 cm (65\")       PHYSICAL EXAM   GENERAL: Not in acute distress, AAOx3, pleasant  CARDIO: regular rate and rhythm  PULM: symmetric chest rise, speaking in complete sentences without difficulty  NEURO: awake, alert and oriented to person, place, and time  ABDOMINAL: No fundal tenderness, no rebound or guarding, gravid  EXTREMITIES: no bilateral lower extremity edema/tenderness  SKIN: Warm, well-perfused      ULTRASOUND   Please view full ultrasound note on Imaging tab in ViewPoint.  Cephalic presentation.  Right lateral fundal placenta.  MVP 4.7 cm, which is normal.   g (AC 53%)  Normal appearing anatomy with suboptimal views as above, including some suboptimal heart views.    Transabdominal cervical length >3.5 cm, which is normal.      ASSESSMENT/COUNSELIN y.o. G  P  at   /7 weeks gestation (Estimated Date of Delivery: 10/4/23)    -Pregnancy  [ X ] stable  [   ] " improving [  ] worsening    Diagnoses and all orders for this visit:    1. FHx: congenital anomaly (Primary)  Overview:  -Patient reports brother passed away at birth from cardiac malformation.  She was soon put in foster system and is unsure of any other details.  -Plan for [facility] referral for anatomy Us +/- fetal echo, ordered      2. Congenital malformation of peripheral vascular system, unspecified  Overview:  -Patient reports vascular malformation in upper right arm.  Present since birth but not genetic.  S/p 5 repairs.  Told it may change with fluctuations with hormones.  Unsure of any other details.  -Discussed plan for serial growths to ensure placental integrity and referral to [facility] for any further recommendations.         Hx of CHD in half brother.  Congenital heart defects occur in approximately 1% of pregnancies.  Congenital heart defects may occur due to multifactorial influences, chromosomal abnormalities, genetic syndromes or environmental exposures.  Isolated heart defects are generally multifactorial.  The overall prognosis is dependent on the severity of the heart defect, and whether or not it is due to an underlying chromosome or genetic problem.  Chromosomal and syndromic etiologies may be associated with other birth defects and mental retardation.  Risk for recurrence depends on etiology.However, with a positive family history, the risk of this baby having a heart defect is around 3-10 percent.  Thus, a fetal ECHO is indicated. Pulmonic stenosis is commonly associated with congenital structural cardiac syndromes, including tetralogy of Fallot and Mazeppa syndrome. Maternal rubella syndrome is also a rare cause of congenital pulmonic stenosis, albeit it is not a genetic-based defect.   In severe cases, it can cause right ventricular dysfunction and right atrial enlargement may occur due to the chronic pressure overload on the right-sided circulation. Specific features depend on the etiology, which  affects the level at which the obstruction to right ventricular outflow occurs.  Critical stenosis is prostaglandin dependent as it affects adequate oxygenation of the  after birth.  It requires intervention shortly after birth.        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 She has had multiple rounds of sclerotherapy and sees [facility].  She last saw them in 2021 and they recommended hydration, compression garments, and progesterone only contraception.      Vascular anomalies are disorders of the endothelium and surrounding cells that can affect the vasculature and involve any anatomical structure. The most common problem associated with vascular anomalies is psychological distress related to the disfigurement as well as functional defects because many lesions affect the head and neck.  Local complications (bleeding, infection, obstruction, pain, thrombosis, ulceration, and destroyed anatomic structures) and general complications (congestive heart failure, disseminated intravascular coagulation, pulmonary embolism, thrombocytopenia, and sepsis) can all be caused by vascular anomalies. Vascular malformations are errors in the morphogenetic processes that regulate vascular development between the fourth and 10th weeks of embryonic life.     In pregnancy, these can certainly grow depending on location and could put patient at risk for bleeding.  I will have patient reach out to [facility] and get an appointment follow up ASAP now that she is pregnant.  I talked to the patient about it and she said the ones on her chest are just capillary and they said weould not cause issues.  She said they had told her previously she may need sclerotherapy after delivery as the one in her arm could grow in pregnancy. I asked her to reach out to them and let them know  she is pregnant and make sure no further workup/recommendations from their perspective.  She will try to have a visit or telehealth with them before her next visit with me.     Summary of Plan  -Follow up anatomy 4 weeks  -Will follow up recs by [facility]--anticipate no further recommendations based on where malformation is located (arm)    Follow-up: 4 weeks follow up anatomy.      Thank you for the consult and opportunity to care for this patient.  Please feel free to reach out with any questions or concerns.      I spent 30 minutes caring for this patient on this date of service. This time includes time spent by me in the following activities: preparing for the visit, reviewing tests, obtaining and/or reviewing a separately obtained history, performing a medically appropriate examination and/or evaluation, counseling and educating the patient/family/caregiver and independently interpreting results and communicating that information with the patient/family/caregiver with greater than 50% spent in counseling and coordination of care.       I spent 5 minutes on the separately reported service of US imaging not included in the time used to support the E/M service also reported today.      Amber Kerns MD FACOG  Maternal Fetal Medicine-[facility]  Office: [phone]  [email or web address]

## 2023-04-27 NOTE — PROGRESS NOTES
Pt reports that she is doing well and denies vaginal bleeding, cramping, contractions or LOF at this time. Reports active fetal movement. Reviewed when to call OB office or present to L&D for evaluation with symptoms such as decreased fetal movement, vaginal bleeding, LOF or ctxs. Pt verbalized understanding. Denies HA, visual changes or epigastric pain. Denies any additional complaints at time of appointment. Next OB appointment scheduled for 05/04    Vitals:    04/27/23 1046   BP: 108/67   Pulse: 80   Resp: 16   Temp: 98.4 °F (36.9 °C)

## 2023-05-04 ENCOUNTER — ROUTINE PRENATAL (OUTPATIENT)
Dept: OBSTETRICS AND GYNECOLOGY | Age: 23
End: 2023-05-04
Payer: COMMERCIAL

## 2023-05-04 VITALS — BODY MASS INDEX: 22.96 KG/M2 | WEIGHT: 138 LBS | SYSTOLIC BLOOD PRESSURE: 110 MMHG | DIASTOLIC BLOOD PRESSURE: 70 MMHG

## 2023-05-04 DIAGNOSIS — Q27.9 CONGENITAL MALFORMATION OF PERIPHERAL VASCULAR SYSTEM, UNSPECIFIED: ICD-10-CM

## 2023-05-04 DIAGNOSIS — O26.892 PREGNANCY HEADACHE IN SECOND TRIMESTER: ICD-10-CM

## 2023-05-04 DIAGNOSIS — Z28.39 MATERNAL VARICELLA, NON-IMMUNE: ICD-10-CM

## 2023-05-04 DIAGNOSIS — R51.9 PREGNANCY HEADACHE IN SECOND TRIMESTER: ICD-10-CM

## 2023-05-04 DIAGNOSIS — Z3A.18 18 WEEKS GESTATION OF PREGNANCY: Primary | ICD-10-CM

## 2023-05-04 DIAGNOSIS — Z82.79 FHX: CONGENITAL ANOMALY: ICD-10-CM

## 2023-05-04 DIAGNOSIS — O09.899 MATERNAL VARICELLA, NON-IMMUNE: ICD-10-CM

## 2023-05-04 PROBLEM — Z3A.15 15 WEEKS GESTATION OF PREGNANCY: Status: ACTIVE | Noted: 2023-03-02

## 2023-05-04 LAB
GLUCOSE UR STRIP-MCNC: NEGATIVE MG/DL
PROT UR STRIP-MCNC: NEGATIVE MG/DL

## 2023-05-04 NOTE — PROGRESS NOTES
Mayra Garner, a 22 y.o.  at 18w1d, presents for OB follow-up.  She is doing well today.  Denies loss of fluid, vaginal bleeding, and contractions.  Endorses early fetal movements.    Objective  BP Readings from Last 3 Encounters:   23 110/70   23 108/67   23 111/73      Wt Readings from Last 3 Encounters:   23 62.6 kg (138 lb)   23 62.8 kg (138 lb 6.4 oz)   23 62.2 kg (137 lb 3.2 oz)      Total weight gain this pregnancy: 1.361 kg (3 lb)    General: Awake, alert, no apparent distress  Respiratory: No increased work of breathing  Abdomen: Fundus soft and nontender  Extremity: Nontender, no edema    A/P  Diagnoses and all orders for this visit:    1. 18 weeks gestation of pregnancy (Primary)  Overview:  -PNL: plan for 1hr GTT/CBC at 24-28wga  -Pap: NIL 3/2/23  -Genetics: cfDNA/carrier screen neg, msAFP today, plan for anatomy Us with MFM  -Imm: RI, VNI, tdap > 27wga, s/p covid vaccine/booster, declines flu shot  -Contraception: discuss at future visit  -Birthplan: no epidural, boy  -Care coordination: accepts blood transfusions, no latex allergy, call schedule reviewed      Orders:  -     POC Urinalysis Dipstick  -     Alpha Fetoprotein, Maternal    2. Pregnancy headache in second trimester  Overview:  -Persistent headache the last few days despite rest and hydration, no consistent URI symptoms  -Rx mag ox sent, also can use tylenol prn  -Improved      3. Maternal varicella, non-immune  Overview:  H/o vaccine      4. Congenital malformation of peripheral vascular system, unspecified  Overview:  -Patient reports vascular malformation in upper right arm.  Present since birth but not genetic.  S/p 5 repairs.  Told it may change with grow with pregnancy.  Unsure of any other details.  -S/p MFM consult, no new recs  -Discussed plan for serial growths to ensure placental integrity      5. FHx: congenital anomaly  Overview:  -Patient reports brother passed away at birth from  cardiac malformation.  She was soon put in foster system and is unsure of any other details.  -S/p MFM consult, no echo needed if anatomy Us normal, scheduled 5/18          SAB precautions reviewed  Return in about 4 weeks (around 6/1/2023) for Next f/u.    Yolanda Sanz MD

## 2023-05-06 LAB
AFP INTERP SERPL-IMP: NORMAL
AFP INTERP SERPL-IMP: NORMAL
AFP MOM SERPL: 0.78
AFP SERPL-MCNC: 37.3 NG/ML
AGE AT DELIVERY: 22.2 YR
GA METHOD: NORMAL
GA: 18.1 WEEKS
IDDM PATIENT QL: NO
LABORATORY COMMENT REPORT: NORMAL
MULTIPLE PREGNANCY: NO
NEURAL TUBE DEFECT RISK FETUS: NORMAL %
RESULT: NORMAL

## 2023-05-14 NOTE — PROGRESS NOTES
MATERNAL FETAL MEDICINE ConsultNote    Dear Dr. Sanz,     Thank you for your kind referral of Mayra Garner.  As you know, she is a 22 y.o.   at 20  1/7 weeks gestation (Estimated Date of Delivery: 10/4/23). This is a consult.      Her antepartum course is complicated by:  Hx half brother with CHD who passed away  Peripheral venous malformation    Aneuploidy Screening: low risk    HPI: Today, she denies headache, blurry vision, RUQ pain. No vaginal bleeding, no contractions.     Review of History:  Past Medical History:   Diagnosis Date   • Vascular abnormality     Right Arm      Past Surgical History:   Procedure Laterality Date   • OTHER SURGICAL HISTORY Right     Arm sclerotherapy       Social History     Socioeconomic History   • Marital status: Single   Tobacco Use   • Smoking status: Never     Passive exposure: Never   • Smokeless tobacco: Never   Vaping Use   • Vaping Use: Never used   Substance and Sexual Activity   • Alcohol use: Not Currently   • Drug use: Not Currently     Types: Marijuana   • Sexual activity: Yes     Partners: Male     Birth control/protection: None     Family History   Adopted: Yes   Problem Relation Age of Onset   • Breast cancer Paternal Grandmother       Allergies   Allergen Reactions   • Shellfish-Derived Products Itching      Current Outpatient Medications on File Prior to Visit   Medication Sig Dispense Refill   • prenatal vitamin (prenatal, CLASSIC, vitamin) tablet Take 1 tablet by mouth Daily.     • fluconazole (Diflucan) 150 MG tablet Take 1 tablet by mouth Daily. (Patient not taking: Reported on 2023) 1 tablet 0   • ondansetron ODT (ZOFRAN-ODT) 4 MG disintegrating tablet Place 1 tablet on the tongue Every 8 (Eight) Hours As Needed for Nausea or Vomiting. (Patient not taking: Reported on 2023) 10 tablet 0     No current facility-administered medications on file prior to visit.        Past obstetric, gynecological, medical, surgical, family and social history  "reviewed.  Relevant lab work and imaging reviewed.    Review of systems  Constitutional:  denies fever, chills, malaise.   ENT/Mouth:  denies sore throat, tinnitis  Eyes: denies vision changes/pain  CV:  denies chest pain  Respiratory:  denies cough/SOB  GI:  denies N/V, diarrhea, abdominal pain.    :   denies dysuria  Skin:  denies lesions or pruritis   Neuro:  denies weakness, focal neurologic symptoms    Vitals:    23 1014   BP: 118/64   BP Location: Right arm   Patient Position: Sitting   Pulse: 88   Resp: 16   Temp: 98.2 °F (36.8 °C)   TempSrc: Temporal   Weight: 64.7 kg (142 lb 9.6 oz)   Height: 165.1 cm (65\")       PHYSICAL EXAM   GENERAL: Not in acute distress, AAOx3, pleasant  CARDIO: regular rate and rhythm  PULM: symmetric chest rise, speaking in complete sentences without difficulty  NEURO: awake, alert and oriented to person, place, and time  ABDOMINAL: No fundal tenderness, no rebound or guarding, gravid  EXTREMITIES: no bilateral lower extremity edema/tenderness  SKIN: Warm, well-perfused      ULTRASOUND   Please view full ultrasound note on Imaging tab in ViewPoint.  Transverse presentation.  Posterior right placenta.  MVP 6.9 cm, which is normal.    g (AC 40%)  Normal appearing follow up anatomy.      ASSESSMENT/COUNSELIN y.o. G  P  at   /7 weeks gestation (Estimated Date of Delivery: 10/4/23)    -Pregnancy  [ X ] stable  [   ] improving [  ] worsening    Diagnoses and all orders for this visit:    1. Congenital malformation of peripheral vascular system, unspecified (Primary)  Overview:  -Patient reports vascular malformation in upper right arm.  Present since birth but not genetic.  S/p 5 repairs.  Told it may change with grow with pregnancy.  Unsure of any other details.  -S/p MFM consult, no new recs  -Discussed plan for serial growths to ensure placental integrity      2. FHx: congenital anomaly  Overview:  -Patient reports brother passed away at birth from cardiac " malformation.  She was soon put in foster system and is unsure of any other details.  -S/p MFM consult, no echo needed if anatomy Us normal, scheduled 5/18         Hx of CHD in half brother.  Congenital heart defects occur in approximately 1% of pregnancies.  Congenital heart defects may occur due to multifactorial influences, chromosomal abnormalities, genetic syndromes or environmental exposures.  Isolated heart defects are generally multifactorial.  The overall prognosis is dependent on the severity of the heart defect, and whether or not it is due to an underlying chromosome or genetic problem.  In a 2nd degree relative, no fetal ECHO is indicated as long as we can get all the heart views.  Follow up heart views normal.  Risk of recurrence is around baseline risk of 1%.         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 She has had multiple rounds of sclerotherapy and sees [facility].  She last saw them in October 2021 and they recommended hydration, compression garments, and progesterone only contraception.      Talked on phone with [facility]--they said well-fitting compression garment she can be fitted for.  For increased pain in pregnancy, could offer Lovenox but wouldn't otherwise.  Will discuss with patient at next visit.  She is not currently having a lot of pain.  Discussed this with her today and her garment is in the mail.  If she has pain, consider lovenox later in pregnancy.  Would do prophylactic 40 mg daily if necessary.      Summary of Plan  -Routine prenatal care  -Continue to assess for pain in vascular malformation--consider Lovenox if necessary (would do prophylactic)    Follow-up: No follow up with MFM scheduled, happy to see at request of primary OB or patient.      Thank you for the consult and opportunity to care for this patient.  Please feel  free to reach out with any questions or concerns.      I spent 15 minutes caring for this patient on this date of service. This time includes time spent by me in the following activities: preparing for the visit, reviewing tests, obtaining and/or reviewing a separately obtained history, performing a medically appropriate examination and/or evaluation, counseling and educating the patient/family/caregiver and independently interpreting results and communicating that information with the patient/family/caregiver with greater than 50% spent in counseling and coordination of care.       I spent 5 minutes on the separately reported service of US imaging not included in the time used to support the E/M service also reported today.      Amber Kerns MD FACOG  Maternal Fetal Medicine-[facility]  Office: [phone]  [email or web address]

## 2023-05-18 ENCOUNTER — HOSPITAL ENCOUNTER (OUTPATIENT)
Dept: ULTRASOUND IMAGING | Facility: HOSPITAL | Age: 23
Discharge: HOME | End: 2023-05-18
Admitting: OBSTETRICS & GYNECOLOGY
Payer: COMMERCIAL

## 2023-05-18 ENCOUNTER — OFFICE VISIT (OUTPATIENT)
Dept: OBSTETRICS AND GYNECOLOGY | Facility: CLINIC | Age: 23
End: 2023-05-18
Payer: COMMERCIAL

## 2023-05-18 VITALS
WEIGHT: 142.6 LBS | DIASTOLIC BLOOD PRESSURE: 64 MMHG | SYSTOLIC BLOOD PRESSURE: 118 MMHG | TEMPERATURE: 98.2 F | RESPIRATION RATE: 16 BRPM | HEART RATE: 88 BPM | HEIGHT: 65 IN | BODY MASS INDEX: 23.76 KG/M2

## 2023-05-18 DIAGNOSIS — Q27.9 CONGENITAL MALFORMATION OF PERIPHERAL VASCULAR SYSTEM, UNSPECIFIED: ICD-10-CM

## 2023-05-18 DIAGNOSIS — Q27.9 CONGENITAL MALFORMATION OF PERIPHERAL VASCULAR SYSTEM, UNSPECIFIED: Primary | ICD-10-CM

## 2023-05-18 DIAGNOSIS — Z82.79 FHX: CONGENITAL ANOMALY: ICD-10-CM

## 2023-05-18 PROCEDURE — 76816 OB US FOLLOW-UP PER FETUS: CPT

## 2023-05-18 NOTE — LETTER
May 18, 2023     Yolanda Sanz MD  [address] [facility] [address]    Patient: Mayra Garner   YOB: 2000   Date of Visit: 2023       Dear Yolanda Sanz MD,    Thank you for referring Mayra Garner to me for evaluation. Below is a copy of my consult note.    If you have questions, please do not hesitate to call me. I look forward to following Mayra along with you.         Sincerely,        Amber Kerns MD    MATERNAL FETAL MEDICINE ConsultNote    Dear Dr. Sanz,     Thank you for your kind referral of Mayra Garner.  As you know, she is a 22 y.o.   at 20  1/7 weeks gestation (Estimated Date of Delivery: 10/4/23). This is a consult.      Her antepartum course is complicated by:  Hx half brother with CHD who passed away  Peripheral venous malformation    Aneuploidy Screening: low risk    HPI: Today, she denies headache, blurry vision, RUQ pain. No vaginal bleeding, no contractions.     Review of History:  Past Medical History:   Diagnosis Date   • Vascular abnormality     Right Arm      Past Surgical History:   Procedure Laterality Date   • OTHER SURGICAL HISTORY Right     Arm sclerotherapy       Social History     Socioeconomic History   • Marital status: Single   Tobacco Use   • Smoking status: Never     Passive exposure: Never   • Smokeless tobacco: Never   Vaping Use   • Vaping Use: Never used   Substance and Sexual Activity   • Alcohol use: Not Currently   • Drug use: Not Currently     Types: Marijuana   • Sexual activity: Yes     Partners: Male     Birth control/protection: None     Family History   Adopted: Yes   Problem Relation Age of Onset   • Breast cancer Paternal Grandmother       Allergies   Allergen Reactions   • Shellfish-Derived Products Itching      Current Outpatient Medications on File Prior to Visit   Medication Sig Dispense Refill   • prenatal vitamin (prenatal, CLASSIC, vitamin) tablet Take 1 tablet by mouth Daily.     • fluconazole  "(Diflucan) 150 MG tablet Take 1 tablet by mouth Daily. (Patient not taking: Reported on 2023) 1 tablet 0   • ondansetron ODT (ZOFRAN-ODT) 4 MG disintegrating tablet Place 1 tablet on the tongue Every 8 (Eight) Hours As Needed for Nausea or Vomiting. (Patient not taking: Reported on 2023) 10 tablet 0     No current facility-administered medications on file prior to visit.        Past obstetric, gynecological, medical, surgical, family and social history reviewed.  Relevant lab work and imaging reviewed.    Review of systems  Constitutional:  denies fever, chills, malaise.   ENT/Mouth:  denies sore throat, tinnitis  Eyes: denies vision changes/pain  CV:  denies chest pain  Respiratory:  denies cough/SOB  GI:  denies N/V, diarrhea, abdominal pain.    :   denies dysuria  Skin:  denies lesions or pruritis   Neuro:  denies weakness, focal neurologic symptoms    Vitals:    23 1014   BP: 118/64   BP Location: Right arm   Patient Position: Sitting   Pulse: 88   Resp: 16   Temp: 98.2 °F (36.8 °C)   TempSrc: Temporal   Weight: 64.7 kg (142 lb 9.6 oz)   Height: 165.1 cm (65\")       PHYSICAL EXAM   GENERAL: Not in acute distress, AAOx3, pleasant  CARDIO: regular rate and rhythm  PULM: symmetric chest rise, speaking in complete sentences without difficulty  NEURO: awake, alert and oriented to person, place, and time  ABDOMINAL: No fundal tenderness, no rebound or guarding, gravid  EXTREMITIES: no bilateral lower extremity edema/tenderness  SKIN: Warm, well-perfused      ULTRASOUND   Please view full ultrasound note on Imaging tab in ViewPoint.  Transverse presentation.  Posterior right placenta.  MVP 6.9 cm, which is normal.    g (AC 40%)  Normal appearing follow up anatomy.      ASSESSMENT/COUNSELIN y.o. G  P  at   /7 weeks gestation (Estimated Date of Delivery: 10/4/23)    -Pregnancy  [ X ] stable  [   ] improving [  ] worsening    Diagnoses and all orders for this visit:    1. Congenital " malformation of peripheral vascular system, unspecified (Primary)  Overview:  -Patient reports vascular malformation in upper right arm.  Present since birth but not genetic.  S/p 5 repairs.  Told it may change with grow with pregnancy.  Unsure of any other details.  -S/p MFM consult, no new recs  -Discussed plan for serial growths to ensure placental integrity      2. FHx: congenital anomaly  Overview:  -Patient reports brother passed away at birth from cardiac malformation.  She was soon put in foster system and is unsure of any other details.  -S/p MFM consult, no echo needed if anatomy Us normal, scheduled 5/18         Hx of CHD in half brother.  Congenital heart defects occur in approximately 1% of pregnancies.  Congenital heart defects may occur due to multifactorial influences, chromosomal abnormalities, genetic syndromes or environmental exposures.  Isolated heart defects are generally multifactorial.  The overall prognosis is dependent on the severity of the heart defect, and whether or not it is due to an underlying chromosome or genetic problem.  In a 2nd degree relative, no fetal ECHO is indicated as long as we can get all the heart views.  Follow up heart views normal.  Risk of recurrence is around baseline risk of 1%.         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 She has had multiple rounds of sclerotherapy and sees [facility].  She last saw them in October 2021 and they recommended hydration, compression garments, and progesterone only contraception.      Talked on phone with [facility]--they said well-fitting compression garment she can be fitted for.  For increased pain in pregnancy, could offer Lovenox but wouldn't otherwise.  Will discuss with patient at next visit.  She is not currently having a lot of pain.  Discussed this with her today and her  jonah is in the mail.  If she has pain, consider lovenox later in pregnancy.  Would do prophylactic 40 mg daily if necessary.      Summary of Plan  -Routine prenatal care  -Continue to assess for pain in vascular malformation--consider Lovenox if necessary (would do prophylactic)    Follow-up: No follow up with MFM scheduled, happy to see at request of primary OB or patient.      Thank you for the consult and opportunity to care for this patient.  Please feel free to reach out with any questions or concerns.      I spent 15 minutes caring for this patient on this date of service. This time includes time spent by me in the following activities: preparing for the visit, reviewing tests, obtaining and/or reviewing a separately obtained history, performing a medically appropriate examination and/or evaluation, counseling and educating the patient/family/caregiver and independently interpreting results and communicating that information with the patient/family/caregiver with greater than 50% spent in counseling and coordination of care.       I spent 5 minutes on the separately reported service of US imaging not included in the time used to support the E/M service also reported today.      Amber Kerns MD FACOG  Maternal Fetal Medicine-[facility]  Office: [phone]  [email or web address]

## 2023-05-18 NOTE — PROGRESS NOTES
Pt reports that she is doing well and denies vaginal bleeding, cramping, contractions or LOF at this time. Reports active fetal movement. Reviewed when to call OB office or present to L&D for evaluation with symptoms such as decreased fetal movement, vaginal bleeding, LOF or ctxs. Pt verbalized understanding. Denies HA, visual changes or epigastric pain. Denies any additional complaints at time of appointment. Next OB appointment scheduled for 06/01    Vitals:    05/18/23 1014   BP: 118/64   Pulse: 88   Resp: 16   Temp: 98.2 °F (36.8 °C)

## 2023-05-30 ENCOUNTER — TELEPHONE (OUTPATIENT)
Dept: OBSTETRICS AND GYNECOLOGY | Age: 23
End: 2023-05-30

## 2023-05-30 NOTE — TELEPHONE ENCOUNTER
PATIENT NEEDS TO RESCHEDULE OB FOLLOW UP WITH  ON 06/01/2023. NEXT AVAILABLE IS 07/13/23.   PLEASE ADVISE PATIENT ON RESCHEDULING [phone].

## 2023-06-07 ENCOUNTER — ROUTINE PRENATAL (OUTPATIENT)
Dept: OBSTETRICS AND GYNECOLOGY | Age: 23
End: 2023-06-07
Payer: COMMERCIAL

## 2023-06-07 VITALS — SYSTOLIC BLOOD PRESSURE: 104 MMHG | DIASTOLIC BLOOD PRESSURE: 60 MMHG | BODY MASS INDEX: 24.53 KG/M2 | WEIGHT: 147.4 LBS

## 2023-06-07 DIAGNOSIS — R51.9 PREGNANCY HEADACHE IN SECOND TRIMESTER: ICD-10-CM

## 2023-06-07 DIAGNOSIS — Z28.39 MATERNAL VARICELLA, NON-IMMUNE: ICD-10-CM

## 2023-06-07 DIAGNOSIS — Z3A.23 23 WEEKS GESTATION OF PREGNANCY: Primary | ICD-10-CM

## 2023-06-07 DIAGNOSIS — O26.892 PREGNANCY HEADACHE IN SECOND TRIMESTER: ICD-10-CM

## 2023-06-07 DIAGNOSIS — Q27.9 CONGENITAL MALFORMATION OF PERIPHERAL VASCULAR SYSTEM, UNSPECIFIED: ICD-10-CM

## 2023-06-07 DIAGNOSIS — O09.899 MATERNAL VARICELLA, NON-IMMUNE: ICD-10-CM

## 2023-06-07 DIAGNOSIS — Z82.79 FHX: CONGENITAL ANOMALY: ICD-10-CM

## 2023-06-07 DIAGNOSIS — Z13.89 SCREENING FOR HEMATURIA OR PROTEINURIA: ICD-10-CM

## 2023-06-07 NOTE — PROGRESS NOTES
Mayra Garner, a 22 y.o.  at 23w0d, presents for OB follow-up.  She is doing well today.  Denies loss of fluid, vaginal bleeding, and contractions.  Endorses fetal movements.  She has been feeling significantly more tired in the last few days.  Also reports having a hard time being outside for prolonged periods of time in the heat.  This is becoming an issue at work because she works as a .    Objective  BP Readings from Last 3 Encounters:   23 104/60   23 118/64   23 110/70      Wt Readings from Last 3 Encounters:   23 66.9 kg (147 lb 6.4 oz)   23 64.7 kg (142 lb 9.6 oz)   23 62.6 kg (138 lb)      Total weight gain this pregnancy: 5.625 kg (12 lb 6.4 oz)    General: Awake, alert, no apparent distress  Respiratory: No increased work of breathing  Abdomen: Fundus soft and nontender  Extremity: Nontender, no edema    A/P  Diagnoses and all orders for this visit:    1. 23 weeks gestation of pregnancy (Primary)  Overview:  -PNL: plan for 1hr GTT/CBC at next visit  -Pap: NIL 3/2/23  -Genetics: cfDNA/carrier screen/msAFP neg, anatomy Us normal  -Imm: RI, VNI (h/o vaccine), tdap at next visit, s/p covid vaccine/booster, declines flu shot  -Contraception: discuss at future visit  -Birthplan: no epidural, boy  -Care coordination: accepts blood transfusions, no latex allergy, call schedule reviewed        2. Screening for hematuria or proteinuria  -     POC Urinalysis Dipstick    3. Congenital malformation of peripheral vascular system, unspecified  Overview:  -Patient reports vascular malformation in upper right arm.  Present since birth but not genetic.  S/p 5 repairs.  Told it may change with grow with pregnancy.  Unsure of any other details.  -S/p MFM consult, consider lovenox ppx if pain develops but otherwise no intervention  -Discussed plan for serial growths to ensure placental integrity - start at next visit      4. FHx: congenital anomaly  Overview:  -Patient  reports brother passed away at birth from cardiac malformation.  She was soon put in foster system and is unsure of any other details.  -S/p MFM consult, no echo needed if anatomy Us normal, scheduled 5/18      5. Maternal varicella, non-immune  Overview:  H/o vaccine      6. Pregnancy headache in second trimester  Overview:  -Persistent headache the last few days despite rest and hydration, no consistent URI symptoms  -Rx mag ox sent, also can use tylenol prn  -Improved          Labor precautions reviewed  Return in about 4 weeks (around 7/5/2023) for F/u with 1hr GTT/CBC and growth Us.    Yolanda Sanz MD

## 2023-06-10 ENCOUNTER — HOSPITAL ENCOUNTER (EMERGENCY)
Facility: HOSPITAL | Age: 23
Discharge: HOME | End: 2023-06-10
Attending: STUDENT IN AN ORGANIZED HEALTH CARE EDUCATION/TRAINING PROGRAM | Admitting: OBSTETRICS & GYNECOLOGY
Payer: COMMERCIAL

## 2023-06-10 VITALS
HEART RATE: 87 BPM | TEMPERATURE: 98.3 F | BODY MASS INDEX: 24.53 KG/M2 | DIASTOLIC BLOOD PRESSURE: 63 MMHG | SYSTOLIC BLOOD PRESSURE: 106 MMHG | RESPIRATION RATE: 17 BRPM | HEIGHT: 65 IN

## 2023-06-10 PROCEDURE — 99283 EMERGENCY DEPT VISIT LOW MDM: CPT | Performed by: OBSTETRICS & GYNECOLOGY

## 2023-06-10 NOTE — OBED NOTES
[facility]        Patient Name: Mayra Garner  YOB: 2000  MRN: [id number]  Admission Date: 6/10/2023 11:36 AM  Date of Service: 6/10/2023    Chief Complaint: Vaginal Bleeding ([facility] -  at 23+3wks arrives from work with c/o bright red vaginal bleeding and cramping since 1100. Pt does report intercourse this AM. Pt reports DFM and denies LOF. Abdomen palpates soft and non tender) and Abdominal Cramping        Subjective     Mayra Garner is a 22 y.o. female  at 23w3d with Estimated Date of Delivery: 10/4/23 who presents with the chief complaint listed above.  She sees Yolanda Sanz MD for her prenatal care. Her pregnancy has been complicated by:   varicella non-immune, family history of cardiac anomaly .    Patient had some bright red bleeding after sex today.  She had some cramping also but notes this is normal for her.  Bleeding has decreased since initial episode.      She describes fetal movement as normal.  She denies rupture of membranes.  She has vaginal bleeding. She is not feeling contractions.          Objective   Patient Active Problem List    Diagnosis     Maternal varicella, non-immune [O09.899, Z28.39]     Pregnancy headache in second trimester [O26.892, R51.9]     FHx: congenital anomaly [Z82.79]     23 weeks gestation of pregnancy [Z3A.23]     Congenital malformation of peripheral vascular system, unspecified [Q27.9]         OB History    Para Term  AB Living   1 0 0 0 0 0   SAB IAB Ectopic Molar Multiple Live Births   0 0 0 0 0 0      # Outcome Date GA Lbr Guido/2nd Weight Sex Delivery Anes PTL Lv   1 Current                 Past Medical History:   Diagnosis Date    Vascular abnormality     Right Arm        Past Surgical History:   Procedure Laterality Date    OTHER SURGICAL HISTORY Right     Arm sclerotherapy       No current facility-administered medications on file prior to encounter.     Current Outpatient Medications on File Prior to  "Encounter   Medication Sig Dispense Refill    prenatal vitamin (prenatal, CLASSIC, vitamin) tablet Take 1 tablet by mouth Daily.      fluconazole (Diflucan) 150 MG tablet Take 1 tablet by mouth Daily. 1 tablet 0    ondansetron ODT (ZOFRAN-ODT) 4 MG disintegrating tablet Place 1 tablet on the tongue Every 8 (Eight) Hours As Needed for Nausea or Vomiting. (Patient not taking: Reported on 5/18/2023) 10 tablet 0       Allergies   Allergen Reactions    Shellfish-Derived Products Anaphylaxis       Family History   Adopted: Yes   Problem Relation Age of Onset    Breast cancer Paternal Grandmother        Social History     Socioeconomic History    Marital status: Single   Tobacco Use    Smoking status: Never     Passive exposure: Never    Smokeless tobacco: Never   Vaping Use    Vaping Use: Never used   Substance and Sexual Activity    Alcohol use: Not Currently    Drug use: Not Currently    Sexual activity: Yes     Partners: Male     Birth control/protection: None           Review of Systems   Constitutional:  Negative for chills, fatigue and fever.   HENT:  Negative for congestion, rhinorrhea and sore throat.    Eyes:  Negative for visual disturbance.   Respiratory: Negative.     Cardiovascular: Negative.    Gastrointestinal:  Negative for abdominal pain, constipation, diarrhea, nausea and vomiting.   Genitourinary:  Positive for vaginal bleeding. Negative for difficulty urinating, dyspareunia, dysuria, flank pain, frequency, genital sores, hematuria, pelvic pain, urgency, vaginal discharge and vaginal pain.   Neurological:  Negative for dizziness, seizures, light-headedness and headaches.   Psychiatric/Behavioral:  Negative for sleep disturbance. The patient is not nervous/anxious.       PHYSICAL EXAM:      VITAL SIGNS:  Vitals:    06/10/23 1157   BP: 106/63   BP Location: Right arm   Patient Position: Sitting   Pulse: 87   Resp: 17   Temp: 98.3 °F (36.8 °C)   TempSrc: Oral   Height: 165.1 cm (65\")        FHT'S:          " "         Normal doptones in 150s        PHYSICAL EXAM:    General: well developed; well nourished  no acute distress   Heart: Not performed.   Lungs  : breathing is unlabored     Abdomen: soft, non-tender; no masses  no umbilical or inguinal hernias are present  no hepato-splenomegaly       Cervix: On speculum exam, small amount of dark red/clotted blood seen at external cervical os.  There is no active bleeding.  Cervix is visually closed.      Contractions: none        Extremities: peripheral pulses normal, no pedal edema, no clubbing or cyanosis      LABS AND TESTING ORDERED:  Uterine and fetal monitoring      LAB RESULTS:    No results found for this or any previous visit (from the past 24 hour(s)).    Lab Results   Component Value Date    ABO A 2023    RH Positive 2023       No results found for: STREPGPB              Assessment & Plan     ASSESSMENT/PLAN:  Mayra Garner is a 22 y.o. female  at 23w3d who presented with: bleeding after intercourse.  Likely due to friable cervix as seen on exam.  No ongoing bleeding noted with closed cervix.  No signs of labor.  Patient reassured.  She was encouraged to practice pelvic rest for next couple days.  She was discharged home with return precautions given for further bleeding, pelvic pressure, or contractions.            Final Impression:  Pregnancy at 23w3d  Bleeding after intercourse, resolved  Maternal vital signs were reviewed and were unremarkable              Vitals:    06/10/23 1157   BP: 106/63   BP Location: Right arm   Patient Position: Sitting   Pulse: 87   Resp: 17   Temp: 98.3 °F (36.8 °C)   TempSrc: Oral   Height: 165.1 cm (65\")       No results found for: STREPGPB  Lab Results   Component Value Date    ABO A 2023    RH Positive 2023     COVID - 19 status unknown      PLAN:       I have spent 30 minutes including face to face time with the patient, greater than 50% in discussion of the diagnosis (counseling) and/or " coordination of care.     Tammi Puente MD  6/10/2023  12:35 EDT  OB Hospitalist  Phone:  [phone]

## 2023-06-10 NOTE — LETTER
Deandra 10, 2023      [facility] OBSTETRIC EMERGENCY DEPARTMENT  [address] [facility] [address]  [phone]          Patient: Mayra Garner   YOB: 2000   Date of Visit: 6/10/2023       To Whom It May Concern:    Mayra Garner was seen at [facility] OBST[facility] EMERGENCY DEPARTMENT on 6/10/2023.    Please excuse  Rashad Adams  (significant other) from work today 06/10/23 as he was accompanying patient to emergency room as support person.        Sincerely,

## 2023-07-06 PROBLEM — Z3A.27 27 WEEKS GESTATION OF PREGNANCY: Status: ACTIVE | Noted: 2023-03-02

## 2023-07-27 ENCOUNTER — ROUTINE PRENATAL (OUTPATIENT)
Dept: OBSTETRICS AND GYNECOLOGY | Age: 23
End: 2023-07-27
Payer: COMMERCIAL

## 2023-07-27 VITALS — DIASTOLIC BLOOD PRESSURE: 68 MMHG | BODY MASS INDEX: 26.59 KG/M2 | SYSTOLIC BLOOD PRESSURE: 112 MMHG | WEIGHT: 159.8 LBS

## 2023-07-27 DIAGNOSIS — Z82.79 FHX: CONGENITAL ANOMALY: ICD-10-CM

## 2023-07-27 DIAGNOSIS — O26.892 PREGNANCY HEADACHE IN SECOND TRIMESTER: ICD-10-CM

## 2023-07-27 DIAGNOSIS — R51.9 PREGNANCY HEADACHE IN SECOND TRIMESTER: ICD-10-CM

## 2023-07-27 DIAGNOSIS — Q27.9 CONGENITAL MALFORMATION OF PERIPHERAL VASCULAR SYSTEM, UNSPECIFIED: ICD-10-CM

## 2023-07-27 DIAGNOSIS — Z3A.30 30 WEEKS GESTATION OF PREGNANCY: Primary | ICD-10-CM

## 2023-07-27 DIAGNOSIS — Z28.39 MATERNAL VARICELLA, NON-IMMUNE: ICD-10-CM

## 2023-07-27 DIAGNOSIS — O09.899 MATERNAL VARICELLA, NON-IMMUNE: ICD-10-CM

## 2023-07-27 DIAGNOSIS — Z13.89 SCREENING FOR HEMATURIA OR PROTEINURIA: ICD-10-CM

## 2023-07-27 NOTE — PROGRESS NOTES
Myara Garner, a 23 y.o.  at 30w1d, presents for OB follow-up.  She is doing well today.  Denies loss of fluid, vaginal bleeding, and contractions.  Having some Dexter Vázquez.  Endorses fetal movements.    Struggling with the demands of work.  She is working as a  60 hours a week and is still on the floor.  Considering taking maternity leave.    Objective  BP Readings from Last 3 Encounters:   23 112/68   23 106/62   23 112/65      Wt Readings from Last 3 Encounters:   23 72.5 kg (159 lb 12.8 oz)   23 71.4 kg (157 lb 6.4 oz)   23 70.3 kg (155 lb)      Total weight gain this pregnancy: 11.2 kg (24 lb 12.8 oz)    General: Awake, alert, no apparent distress  Respiratory: No increased work of breathing  Abdomen: Fundus soft and nontender  Extremity: Nontender, no edema    A/P  Diagnoses and all orders for this visit:    1. 30 weeks gestation of pregnancy (Primary)  Overview:  -PNL: 1hr GTT normal  -Pap: NIL 3/2/23  -Genetics: cfDNA/carrier screen/msAFP neg, anatomy Us normal  -Imm: RI, VNI (h/o vaccine), s/p tdap, s/p covid vaccine/booster, declines flu shot  -Contraception: likely does not want any  -Birthplan: no epidural, boy, circ, breast  -Care coordination: accepts blood transfusions, no latex allergy, call schedule reviewed      Orders:  -     POC Urinalysis Dipstick    2. Screening for hematuria or proteinuria  -     POC Urinalysis Dipstick    3. Congenital malformation of peripheral vascular system, unspecified  Overview:  -Patient reports vascular malformation in upper right arm.  Present since birth but not genetic.  S/p 5 repairs.  Told it may change with grow with pregnancy.  Unsure of any other details.  -S/p MFM consult, consider lovenox ppx if pain develops but otherwise no intervention  -Plan for serial growth Us, normal at 27wga, repeat at 31-32wga      4. FHx: congenital anomaly  Overview:  -Patient reports brother passed away at birth from  cardiac malformation.  She was soon put in foster system and is unsure of any other details.  -S/p MFM consult, anatomy Us normal, no echo needed      5. Maternal varicella, non-immune  Overview:  H/o vaccine      6. Pregnancy headache in second trimester  Overview:  -Persistent headache the last few days despite rest and hydration, no consistent URI symptoms  -Rx mag ox sent, also can use tylenol prn  -Improved        Work letter provided  Labor precautions reviewed  Return in about 2 weeks (around 8/10/2023) for appt q2 wks x 3, next appt with growth Us.    Yolanda Sanz MD

## 2023-08-09 ENCOUNTER — TELEPHONE (OUTPATIENT)
Dept: OBSTETRICS AND GYNECOLOGY | Age: 23
End: 2023-08-09
Payer: COMMERCIAL

## 2023-08-09 ENCOUNTER — HOSPITAL ENCOUNTER (EMERGENCY)
Facility: HOSPITAL | Age: 23
Discharge: HOME | End: 2023-08-09
Attending: STUDENT IN AN ORGANIZED HEALTH CARE EDUCATION/TRAINING PROGRAM | Admitting: OBSTETRICS & GYNECOLOGY
Payer: COMMERCIAL

## 2023-08-09 VITALS
BODY MASS INDEX: 27.32 KG/M2 | TEMPERATURE: 98.6 F | HEART RATE: 96 BPM | WEIGHT: 164 LBS | HEIGHT: 65 IN | RESPIRATION RATE: 16 BRPM

## 2023-08-09 LAB
BACTERIA UR QL AUTO: ABNORMAL /HPF
BILIRUB UR QL STRIP: NEGATIVE
CLARITY UR: CLEAR
COLOR UR: YELLOW
GLUCOSE UR STRIP-MCNC: NEGATIVE MG/DL
HGB UR QL STRIP.AUTO: NEGATIVE
HYALINE CASTS UR QL AUTO: ABNORMAL /LPF
KETONES UR QL STRIP: NEGATIVE
LEUKOCYTE ESTERASE UR QL STRIP.AUTO: NEGATIVE
NITRITE UR QL STRIP: POSITIVE
PH UR STRIP.AUTO: 6.5 [PH] (ref 5–8)
PROT UR QL STRIP: NEGATIVE
RBC # UR STRIP: ABNORMAL /HPF
REF LAB TEST METHOD: ABNORMAL
SP GR UR STRIP: <1.005 (ref 1–1.03)
SQUAMOUS #/AREA URNS HPF: ABNORMAL /HPF
UROBILINOGEN UR QL STRIP: ABNORMAL
WBC # UR STRIP: ABNORMAL /HPF

## 2023-08-09 PROCEDURE — 59025 FETAL NON-STRESS TEST: CPT

## 2023-08-09 PROCEDURE — 99283 EMERGENCY DEPT VISIT LOW MDM: CPT | Performed by: OBSTETRICS & GYNECOLOGY

## 2023-08-09 PROCEDURE — 87086 URINE CULTURE/COLONY COUNT: CPT | Performed by: OBSTETRICS & GYNECOLOGY

## 2023-08-09 PROCEDURE — 87147 CULTURE TYPE IMMUNOLOGIC: CPT | Performed by: OBSTETRICS & GYNECOLOGY

## 2023-08-09 PROCEDURE — 81001 URINALYSIS AUTO W/SCOPE: CPT | Performed by: OBSTETRICS & GYNECOLOGY

## 2023-08-09 NOTE — TELEPHONE ENCOUNTER
OB Pt: 32w0d     Pt calling stating she has noticed for the past day her stomach has felt hard and at times has noticed one side would look like it was indented and the other side normal. Pt is scheduled tomorrow but wanted to know if this is normal.     Please advise       [phone]

## 2023-08-09 NOTE — OBED NOTES
" Keila Garner  : 2000  MRN: [id number]  CSN: [id number]    OB ED Provider Note    Subjective   Chief Complaint   Patient presents with    Decreased Fetal Movement     Pt arrives to [facility] for decreased fetal movement and tightening in her abdomen.     Mayra Garner is a 23 y.o. year old  with an Estimated Date of Delivery: 10/4/23 currently at 32w0d presenting with no FM since this morning. Since arrival to the [facility], she is noting movement.  She thinks she may be having some CTX as she is noting some occasional tightening of her abdomen. No ROM or VB.    Prenatal care has been with Dr. Sanz.  It has been complicated by vascular malformation RUE .    OB History    Para Term  AB Living   2 0 0 0 1 0   SAB IAB Ectopic Molar Multiple Live Births   1 0 0 0 0 0      # Outcome Date GA Lbr Guido/2nd Weight Sex Delivery Anes PTL Lv   2 Current            1 SAB 2020 4w0d            Past Medical History:   Diagnosis Date    Trauma     10 years old    Vascular abnormality     Right Arm      Past Surgical History:   Procedure Laterality Date    OTHER SURGICAL HISTORY Right     Arm sclerotherapy x5     No current facility-administered medications for this encounter.    Allergies   Allergen Reactions    Shellfish-Derived Products Anaphylaxis     Social History    Tobacco Use      Smoking status: Never      Smokeless tobacco: Never    Review of Systems   All other systems reviewed and are negative.      Objective   Pulse 96   Temp 98.6 øF (37 øC) (Oral)   Resp 16   Ht 165.1 cm (65\")   Wt 74.4 kg (164 lb)   LMP 2022   BMI 27.29 kg/mý   General: well developed; well nourished  no acute distress   Abdomen: soft, non-tender; no masses  gravid    FHT's: reactive and category 1      Cervix: was not checked.   Presentation: cephalic   Contractions: irregular   Chest: Unlabored respirations    CV:  RRR   Ext:   No C/C/E   Back: CVA tenderness is deferred bilateral        Prenatal " Labs  Lab Results   Component Value Date    HGB 11.3 (L) 07/06/2023    RUBELLAABIGG 2.31 03/02/2023    HEPBSAG Negative 03/02/2023    ABSCRN Negative 03/02/2023    VOB0YIS1 Non Reactive 03/02/2023    HEPCVIRUSABY Non Reactive 03/02/2023    GCT 86 07/06/2023    URINECX <25,000 CFU/mL Normal Urogenital Shannan 04/25/2023    CHLAMNAA Negative 04/11/2023    NGONORRHON Negative 04/11/2023       Current Labs Reviewed   UA:    Lab Results   Component Value Date    SQUAMEPIUA 0-2 08/09/2023    SPECGRAVUR <1.005 (L) 08/09/2023    KETONESU Negative 08/09/2023    BLOODU Negative 08/09/2023    LEUKOCYTESUR Negative 08/09/2023    NITRITEU Positive (A) 08/09/2023    RBCUA None Seen 08/09/2023    WBCUA 0-2 08/09/2023    BACTERIA 4+ (A) 08/09/2023          Assessment   IUP at 32w0d  Decreased FM- Resolved. Cat I NST     Plan   D/C home with [facility]. Tips to induce FM provided. Return for worsening symptoms, acute changes. Keep regularly scheduled prenatal appointments.      James Turcios MD  8/9/2023  19:00 EDT

## 2023-08-09 NOTE — NURSING NOTE
Discharge instructions reviewed with patient. Discussed fetal kick counts, vaginal bleeding, and leaking of fluid. Patient to keep all scheduled appointments, to call MD or return to L&D if any additional problems.  Patient ambulated off unit.

## 2023-08-10 ENCOUNTER — ROUTINE PRENATAL (OUTPATIENT)
Dept: OBSTETRICS AND GYNECOLOGY | Age: 23
End: 2023-08-10
Payer: COMMERCIAL

## 2023-08-10 VITALS — SYSTOLIC BLOOD PRESSURE: 118 MMHG | WEIGHT: 166 LBS | BODY MASS INDEX: 27.62 KG/M2 | DIASTOLIC BLOOD PRESSURE: 70 MMHG

## 2023-08-10 DIAGNOSIS — N89.8 VAGINAL ODOR: ICD-10-CM

## 2023-08-10 DIAGNOSIS — Z82.79 FHX: CONGENITAL ANOMALY: ICD-10-CM

## 2023-08-10 DIAGNOSIS — R51.9 PREGNANCY HEADACHE IN SECOND TRIMESTER: ICD-10-CM

## 2023-08-10 DIAGNOSIS — Z3A.32 32 WEEKS GESTATION OF PREGNANCY: Primary | ICD-10-CM

## 2023-08-10 DIAGNOSIS — Z28.39 MATERNAL VARICELLA, NON-IMMUNE: ICD-10-CM

## 2023-08-10 DIAGNOSIS — Z13.89 SCREENING FOR HEMATURIA OR PROTEINURIA: ICD-10-CM

## 2023-08-10 DIAGNOSIS — Q27.9 CONGENITAL MALFORMATION OF PERIPHERAL VASCULAR SYSTEM, UNSPECIFIED: ICD-10-CM

## 2023-08-10 DIAGNOSIS — O09.899 MATERNAL VARICELLA, NON-IMMUNE: ICD-10-CM

## 2023-08-10 DIAGNOSIS — O26.892 PREGNANCY HEADACHE IN SECOND TRIMESTER: ICD-10-CM

## 2023-08-10 LAB
BACTERIA SPEC AEROBE CULT: ABNORMAL
CLARITY, POC: CLEAR
COLOR UR: YELLOW
GLUCOSE UR STRIP-MCNC: NEGATIVE MG/DL
PROT UR STRIP-MCNC: NEGATIVE MG/DL

## 2023-08-10 NOTE — PROGRESS NOTES
Mayra Garner, a 23 y.o.  at 32w1d, presents for OB follow-up.  She is doing well today.  Denies loss of fluid, vaginal bleeding, and contractions.  Endorses fetal movements.  Has started having some Codorus Vázquez.  Also noticed increased vaginal discharge with odor.    Objective  BP Readings from Last 3 Encounters:   08/10/23 118/70   23 112/68   23 106/62      Wt Readings from Last 3 Encounters:   08/10/23 75.3 kg (166 lb)   23 74.4 kg (164 lb)   23 72.5 kg (159 lb 12.8 oz)      Total weight gain this pregnancy: 14.1 kg (31 lb)    General: Awake, alert, no apparent distress  Respiratory: No increased work of breathing  Abdomen: Fundus soft and nontender  Extremity: Nontender, no edema  : thin white vaginal discharge, cervix visually 0.5cm dilated    A/P  Diagnoses and all orders for this visit:    1. 32 weeks gestation of pregnancy (Primary)  Overview:  -PNL: 1hr GTT normal  -Pap: NIL 3/2/23  -Genetics: cfDNA/carrier screen/msAFP neg, anatomy Us normal  -Imm: RI, VNI (h/o vaccine), s/p tdap, s/p covid vaccine/booster, declines flu shot  -Contraception: likely does not want any  -Birthplan: no epidural, boy, circ, breast  -Care coordination: accepts blood transfusions, no latex allergy, call schedule reviewed      Orders:  -     POC Urinalysis Dipstick  -     NuSwab Vaginitis (VG) - Swab, Vagina    2. Screening for hematuria or proteinuria  -     POC Urinalysis Dipstick    3. Congenital malformation of peripheral vascular system, unspecified  Overview:  -Patient reports vascular malformation in upper right arm.  Present since birth but not genetic.  S/p 5 repairs.  Told it may change with grow with pregnancy.  Unsure of any other details.  -S/p MFM consult, consider lovenox ppx if pain develops but otherwise no intervention  -Plan for serial growth Us, normal at 32 wga, repeat at 36wga      4. FHx: congenital anomaly  Overview:  -Patient reports brother passed away at birth from  cardiac malformation.  She was soon put in foster system and is unsure of any other details.  -S/p MFM consult, anatomy Us normal, no echo needed      5. Maternal varicella, non-immune  Overview:  H/o vaccine      6. Pregnancy headache in second trimester  Overview:  -Persistent headache the last few days despite rest and hydration, no consistent URI symptoms  -Rx mag ox sent, also can use tylenol prn  -Improved      7. Vaginal odor  -     NuSwab Vaginitis (VG) - Swab, Vagina        Labor precautions reviewed  Return in about 2 weeks (around 8/24/2023) for Next f/u.    Yolanda Sanz MD

## 2023-08-11 PROBLEM — O23.40 UTI (URINARY TRACT INFECTION) DURING PREGNANCY: Status: ACTIVE | Noted: 2023-08-11

## 2023-08-11 RX ORDER — NITROFURANTOIN 25; 75 MG/1; MG/1
100 CAPSULE ORAL 2 TIMES DAILY
Qty: 14 CAPSULE | Refills: 0 | Status: SHIPPED | OUTPATIENT
Start: 2023-08-11 | End: 2023-08-18

## 2023-08-13 LAB
A VAGINAE DNA VAG QL NAA+PROBE: ABNORMAL SCORE
BVAB2 DNA VAG QL NAA+PROBE: ABNORMAL SCORE
C ALBICANS DNA VAG QL NAA+PROBE: POSITIVE
C GLABRATA DNA VAG QL NAA+PROBE: NEGATIVE
MEGA1 DNA VAG QL NAA+PROBE: ABNORMAL SCORE
T VAGINALIS DNA VAG QL NAA+PROBE: NEGATIVE

## 2023-08-14 RX ORDER — CLOTRIMAZOLE 1 %
CREAM WITH APPLICATOR VAGINAL DAILY
Qty: 45 G | Refills: 0 | Status: SHIPPED | OUTPATIENT
Start: 2023-08-14 | End: 2023-08-21

## 2023-08-14 NOTE — PROGRESS NOTES
Please notify patient: Swab negative for BV but does show yeast infection.  I have sent in clotrimazole for her to treat this.

## 2023-08-15 ENCOUNTER — TELEPHONE (OUTPATIENT)
Dept: OBSTETRICS AND GYNECOLOGY | Age: 23
End: 2023-08-15
Payer: COMMERCIAL

## 2023-08-15 NOTE — TELEPHONE ENCOUNTER
OB Pt: 32w6d    Pt called stating she is being treated for both a UTI and yeast infection. Pt states she has checked multiple pharmacys and no one has the lotrimin vaginal cream and was wanting to know if she can take the monistat 7 day instead.    Please advise     [phone]

## 2023-08-24 ENCOUNTER — ROUTINE PRENATAL (OUTPATIENT)
Dept: OBSTETRICS AND GYNECOLOGY | Age: 23
End: 2023-08-24
Payer: COMMERCIAL

## 2023-08-24 VITALS — DIASTOLIC BLOOD PRESSURE: 78 MMHG | BODY MASS INDEX: 28.52 KG/M2 | SYSTOLIC BLOOD PRESSURE: 124 MMHG | WEIGHT: 171.4 LBS

## 2023-08-24 DIAGNOSIS — O23.43 URINARY TRACT INFECTION IN MOTHER DURING THIRD TRIMESTER OF PREGNANCY: ICD-10-CM

## 2023-08-24 DIAGNOSIS — Q27.9 CONGENITAL MALFORMATION OF PERIPHERAL VASCULAR SYSTEM, UNSPECIFIED: ICD-10-CM

## 2023-08-24 DIAGNOSIS — R51.9 PREGNANCY HEADACHE IN THIRD TRIMESTER: ICD-10-CM

## 2023-08-24 DIAGNOSIS — Z3A.34 34 WEEKS GESTATION OF PREGNANCY: Primary | ICD-10-CM

## 2023-08-24 DIAGNOSIS — O26.893 PREGNANCY HEADACHE IN THIRD TRIMESTER: ICD-10-CM

## 2023-08-24 DIAGNOSIS — Z13.89 SCREENING FOR HEMATURIA OR PROTEINURIA: ICD-10-CM

## 2023-08-24 DIAGNOSIS — O09.899 MATERNAL VARICELLA, NON-IMMUNE: ICD-10-CM

## 2023-08-24 DIAGNOSIS — Z28.39 MATERNAL VARICELLA, NON-IMMUNE: ICD-10-CM

## 2023-08-24 DIAGNOSIS — Z82.79 FHX: CONGENITAL ANOMALY: ICD-10-CM

## 2023-08-24 NOTE — PROGRESS NOTES
Mayra Garner, a 23 y.o.  at 34w1d, presents for OB follow-up.  She is doing well today.  Denies loss of fluid, vaginal bleeding, and contractions.  Endorses fetal movements.  Feeling more Hardy Vázquez contractions, mostly at night.    Objective  BP Readings from Last 3 Encounters:   23 124/78   08/10/23 118/70   23 112/68      Wt Readings from Last 3 Encounters:   23 77.7 kg (171 lb 6.4 oz)   08/10/23 75.3 kg (166 lb)   23 74.4 kg (164 lb)      Total weight gain this pregnancy: 16.5 kg (36 lb 6.4 oz)    General: Awake, alert, no apparent distress  Respiratory: No increased work of breathing  Abdomen: Fundus soft and nontender  Extremity: Nontender, no edema    A/P  Diagnoses and all orders for this visit:    1. 34 weeks gestation of pregnancy (Primary)  Overview:  -PNL: 1hr GTT normal  -Pap: NIL 3/2/23  -Genetics: cfDNA/carrier screen/msAFP neg, anatomy Us normal  -Imm: RI, VNI (h/o vaccine), s/p tdap, s/p covid vaccine/booster, declines flu shot  -Contraception: likely does not want any  -Birthplan: no epidural, boy, circ, breast  -Care coordination: accepts blood transfusions, no latex allergy, call schedule reviewed      Orders:  -     POC Urinalysis Dipstick  -     Urine Culture - Urine, Urine, Clean Catch    2. Screening for hematuria or proteinuria  -     POC Urinalysis Dipstick    3. Congenital malformation of peripheral vascular system, unspecified  Overview:  -Patient reports vascular malformation in upper right arm.  Present since birth but not genetic.  S/p 5 repairs.  Told it may change with grow with pregnancy.  Unsure of any other details.  -S/p MFM consult, consider lovenox ppx if pain develops but otherwise no intervention  -Plan for serial growth Us, normal at 32 wga, repeat at 36wga      4. FHx: congenital anomaly  Overview:  -Patient reports brother passed away at birth from cardiac malformation.  She was soon put in foster system and is unsure of any other  details.  -S/p MFM consult, anatomy Us normal, no echo needed      5. Urinary tract infection in mother during third trimester of pregnancy  Overview:  -urine cx pos 8/9/23, rx macrobid sent  -repeat urine cx today    Orders:  -     Urine Culture - Urine, Urine, Clean Catch    6. Maternal varicella, non-immune  Overview:  H/o vaccine      7. Pregnancy headache in third trimester  Overview:  -Persistent headache the last few days despite rest and hydration, no consistent URI symptoms  -Rx mag ox sent, also can use tylenol prn  -Improved          Labor precautions reviewed  Return in about 2 weeks (around 9/7/2023) for Next f/u with growth Us.    Yolanda Sanz MD

## 2023-08-26 LAB
BACTERIA UR CULT: NO GROWTH
BACTERIA UR CULT: NORMAL

## 2023-09-07 ENCOUNTER — ROUTINE PRENATAL (OUTPATIENT)
Dept: OBSTETRICS AND GYNECOLOGY | Age: 23
End: 2023-09-07
Payer: COMMERCIAL

## 2023-09-07 VITALS — SYSTOLIC BLOOD PRESSURE: 120 MMHG | DIASTOLIC BLOOD PRESSURE: 74 MMHG | WEIGHT: 179 LBS | BODY MASS INDEX: 29.79 KG/M2

## 2023-09-07 DIAGNOSIS — O23.43 URINARY TRACT INFECTION IN MOTHER DURING THIRD TRIMESTER OF PREGNANCY: ICD-10-CM

## 2023-09-07 DIAGNOSIS — Z82.79 FHX: CONGENITAL ANOMALY: ICD-10-CM

## 2023-09-07 DIAGNOSIS — Z36.85 ENCOUNTER FOR ANTENATAL SCREENING FOR STREPTOCOCCUS B: ICD-10-CM

## 2023-09-07 DIAGNOSIS — Z3A.36 36 WEEKS GESTATION OF PREGNANCY: Primary | ICD-10-CM

## 2023-09-07 DIAGNOSIS — O09.899 MATERNAL VARICELLA, NON-IMMUNE: ICD-10-CM

## 2023-09-07 DIAGNOSIS — Q27.9 CONGENITAL MALFORMATION OF PERIPHERAL VASCULAR SYSTEM, UNSPECIFIED: ICD-10-CM

## 2023-09-07 DIAGNOSIS — O26.893 PREGNANCY HEADACHE IN THIRD TRIMESTER: ICD-10-CM

## 2023-09-07 DIAGNOSIS — R51.9 PREGNANCY HEADACHE IN THIRD TRIMESTER: ICD-10-CM

## 2023-09-07 DIAGNOSIS — Z28.39 MATERNAL VARICELLA, NON-IMMUNE: ICD-10-CM

## 2023-09-07 LAB
BILIRUB BLD-MCNC: NEGATIVE MG/DL
CLARITY, POC: CLEAR
COLOR UR: YELLOW
GLUCOSE UR STRIP-MCNC: NEGATIVE MG/DL
KETONES UR QL: NEGATIVE
LEUKOCYTE EST, POC: NEGATIVE
NITRITE UR-MCNC: NEGATIVE MG/ML
PH UR: 7 [PH] (ref 5–8)
PROT UR STRIP-MCNC: NEGATIVE MG/DL
RBC # UR STRIP: NEGATIVE /UL
SP GR UR: 1.02 (ref 1–1.03)
UROBILINOGEN UR QL: NORMAL

## 2023-09-07 NOTE — PROGRESS NOTES
Chief Complaint   Patient presents with    Routine Prenatal Visit     36 weeks,   Had a yeast infection and UTI last visit, wants to make sure that is clear  CC: No problems       HPI: 23 y.o.  at 36w1d     Doing well  Reports good FM  Denies LOF, bleeding or ctx's  Had UTI, completed tx, no symptoms  Pt of Dr. Sanz     Vitals:    23 0937   BP: 120/74   Weight: 81.2 kg (179 lb)       ROS:  GI:  Negative  : Negative  Pulmonary: Negative     A/P  1. Intrauterine pregnancy at 36w1d   2. Pregnancy Risk:  NORMAL    Diagnoses and all orders for this visit:    1. 36 weeks gestation of pregnancy (Primary)  -     POC Urinalysis Dipstick    2. FHx: congenital anomaly    3. Congenital malformation of peripheral vascular system, unspecified    4. Urinary tract infection in mother during third trimester of pregnancy    5. Maternal varicella, non-immune    6. Pregnancy headache in third trimester    7. Encounter for  screening for Streptococcus B  -     Group B Streptococcus Culture - Swab, Vaginal/Rectum        -----------------------  PLAN:   Normal growth  GBS done  PTL/FKC discussed  F/u 1 week  Return for Next scheduled follow up.      Marcela Buitrago, RADHA  2023 09:57 EDT

## 2023-09-11 LAB — B-HEM STREP SPEC QL CULT: NEGATIVE

## 2023-09-14 ENCOUNTER — ROUTINE PRENATAL (OUTPATIENT)
Dept: OBSTETRICS AND GYNECOLOGY | Age: 23
End: 2023-09-14
Payer: COMMERCIAL

## 2023-09-14 VITALS — WEIGHT: 180.8 LBS | BODY MASS INDEX: 30.09 KG/M2 | DIASTOLIC BLOOD PRESSURE: 72 MMHG | SYSTOLIC BLOOD PRESSURE: 120 MMHG

## 2023-09-14 DIAGNOSIS — Z82.79 FHX: CONGENITAL ANOMALY: ICD-10-CM

## 2023-09-14 DIAGNOSIS — Q27.9 CONGENITAL MALFORMATION OF PERIPHERAL VASCULAR SYSTEM, UNSPECIFIED: ICD-10-CM

## 2023-09-14 DIAGNOSIS — O26.893 PREGNANCY HEADACHE IN THIRD TRIMESTER: ICD-10-CM

## 2023-09-14 DIAGNOSIS — R51.9 PREGNANCY HEADACHE IN THIRD TRIMESTER: ICD-10-CM

## 2023-09-14 DIAGNOSIS — O23.43 URINARY TRACT INFECTION IN MOTHER DURING THIRD TRIMESTER OF PREGNANCY: ICD-10-CM

## 2023-09-14 DIAGNOSIS — Z3A.37 37 WEEKS GESTATION OF PREGNANCY: Primary | ICD-10-CM

## 2023-09-14 DIAGNOSIS — O09.899 MATERNAL VARICELLA, NON-IMMUNE: ICD-10-CM

## 2023-09-14 DIAGNOSIS — Z13.89 SCREENING FOR HEMATURIA OR PROTEINURIA: ICD-10-CM

## 2023-09-14 DIAGNOSIS — Z28.39 MATERNAL VARICELLA, NON-IMMUNE: ICD-10-CM

## 2023-09-14 NOTE — PROGRESS NOTES
Mayra Garner, a 23 y.o.  at 37w1d, presents for OB follow-up.  She is doing well today.  Denies loss of fluid, vaginal bleeding, and contractions.  Endorses fetal movements.    Objective  BP Readings from Last 3 Encounters:   23 120/72   23 120/74   23 124/78      Wt Readings from Last 3 Encounters:   23 82 kg (180 lb 12.8 oz)   23 81.2 kg (179 lb)   23 77.7 kg (171 lb 6.4 oz)      Total weight gain this pregnancy: 20.8 kg (45 lb 12.8 oz)    General: Awake, alert, no apparent distress  Respiratory: No increased work of breathing  Abdomen: Fundus soft and nontender  Extremity: Nontender, no edema    A/P  Diagnoses and all orders for this visit:    1. 37 weeks gestation of pregnancy (Primary)  Overview:  -PNL: 1hr GTT normal, GBS neg  -Pap: NIL 3/2/23  -Genetics: cfDNA/carrier screen/msAFP neg, anatomy Us normal  -Imm: RI, VNI (h/o vaccine), s/p tdap, s/p covid vaccine/booster, declines flu shot  -Contraception: likely does not want any  -Birthplan: no epidural, boy, circ, breast  -Care coordination: accepts blood transfusions, no latex allergy, call schedule reviewed  -Elective IOL scheduled , arrive at 1600, Sanz will manage      Orders:  -     POC Urinalysis Dipstick  -     Labor Induction; Future    2. Screening for hematuria or proteinuria  -     POC Urinalysis Dipstick    3. Congenital malformation of peripheral vascular system, unspecified  Overview:  -Patient reports vascular malformation in upper right arm.  Present since birth but not genetic.  S/p 5 repairs.  Told it may change with grow with pregnancy.  Unsure of any other details.  -S/p MFM consult, consider lovenox ppx if pain develops but otherwise no intervention  -Plan for serial growth Us, normal at 36 wga      4. FHx: congenital anomaly  Overview:  -Patient reports brother passed away at birth from cardiac malformation.  She was soon put in foster system and is unsure of any other details.  -S/p MFM  consult, anatomy Us normal, no echo needed      5. Urinary tract infection in mother during third trimester of pregnancy  Overview:  -urine cx pos 8/9/23, rx macrobid sent  -repeat urine cx neg      6. Maternal varicella, non-immune  Overview:  H/o vaccine      7. Pregnancy headache in third trimester  Overview:  -Persistent headache the last few days despite rest and hydration, no consistent URI symptoms  -Rx mag ox sent, also can use tylenol prn  -Improved          Labor precautions reviewed  Return in about 1 week (around 9/21/2023) for weekly appt x 2, okay to alternate with NP.    Yolanda Sanz MD

## 2023-09-15 ENCOUNTER — TELEPHONE (OUTPATIENT)
Dept: OBSTETRICS AND GYNECOLOGY | Age: 23
End: 2023-09-15
Payer: COMMERCIAL

## 2023-09-15 ENCOUNTER — HOSPITAL ENCOUNTER (EMERGENCY)
Facility: HOSPITAL | Age: 23
Discharge: HOME | End: 2023-09-15
Attending: STUDENT IN AN ORGANIZED HEALTH CARE EDUCATION/TRAINING PROGRAM | Admitting: STUDENT IN AN ORGANIZED HEALTH CARE EDUCATION/TRAINING PROGRAM
Payer: COMMERCIAL

## 2023-09-15 VITALS
HEART RATE: 95 BPM | SYSTOLIC BLOOD PRESSURE: 117 MMHG | HEIGHT: 65 IN | RESPIRATION RATE: 16 BRPM | WEIGHT: 179 LBS | TEMPERATURE: 98.6 F | OXYGEN SATURATION: 100 % | DIASTOLIC BLOOD PRESSURE: 79 MMHG | BODY MASS INDEX: 29.82 KG/M2

## 2023-09-15 LAB
A1 MICROGLOB PLACENTAL VAG QL: NEGATIVE
BILIRUB UR QL STRIP: NEGATIVE
CLARITY UR: CLEAR
COLOR UR: YELLOW
GLUCOSE UR STRIP-MCNC: NEGATIVE MG/DL
HGB UR QL STRIP.AUTO: NEGATIVE
KETONES UR QL STRIP: NEGATIVE
LEUKOCYTE ESTERASE UR QL STRIP.AUTO: NEGATIVE
NITRITE UR QL STRIP: NEGATIVE
PH UR STRIP.AUTO: 7.5 [PH] (ref 5–8)
PROT UR QL STRIP: NEGATIVE
SP GR UR STRIP: <1.005 (ref 1–1.03)
UROBILINOGEN UR QL STRIP: ABNORMAL

## 2023-09-15 PROCEDURE — 84112 EVAL AMNIOTIC FLUID PROTEIN: CPT | Performed by: OBSTETRICS & GYNECOLOGY

## 2023-09-15 PROCEDURE — 87086 URINE CULTURE/COLONY COUNT: CPT | Performed by: OBSTETRICS & GYNECOLOGY

## 2023-09-15 PROCEDURE — 99284 EMERGENCY DEPT VISIT MOD MDM: CPT | Performed by: OBSTETRICS & GYNECOLOGY

## 2023-09-15 PROCEDURE — 81003 URINALYSIS AUTO W/O SCOPE: CPT | Performed by: OBSTETRICS & GYNECOLOGY

## 2023-09-15 PROCEDURE — 59025 FETAL NON-STRESS TEST: CPT

## 2023-09-15 NOTE — OBED NOTES
[facility] Note OB        Patient Name: Mayra Garner  YOB: 2000  MRN: [id number]  Admission Date: 9/15/2023 10:33 AM  Date of Service: 9/15/2023    Chief Complaint: Rupture of Membranes ([facility] - pt states she was seen in the office yesterday and had a membrane sweep, since then she has had several big gushes of fluid. She reports intermittent marley borden contractions and denies vaginal bleeding. Good fetal movement.)        Subjective     Mayra Garner is a 23 y.o. female  at 37w2d with Estimated Date of Delivery: 10/4/23 who presents with the chief complaint listed above.  She sees Yolanda Sanz MD for her prenatal care. Her pregnancy has been complicated by: UTI, maternal varicella nonimmune.  Patient presents due to possible ROM.  She reports several big gushes of fluid since last night, associated with some intermittent contractions.  She denies vaginal bleeding.  She is feeling normal fetal movement. She reports membranes were swept yesterday.          Objective   Patient Active Problem List    Diagnosis     UTI (urinary tract infection) during pregnancy [O23.40]     Maternal varicella, non-immune [O09.899, Z28.39]     Pregnancy headache in third trimester [O26.893, R51.9]     FHx: congenital anomaly [Z82.79]     37 weeks gestation of pregnancy [Z3A.37]     Congenital malformation of peripheral vascular system, unspecified [Q27.9]         OB History    Para Term  AB Living   2 0 0 0 1 0   SAB IAB Ectopic Molar Multiple Live Births   1 0 0 0 0 0      # Outcome Date GA Lbr Guido/2nd Weight Sex Delivery Anes PTL Lv   2 Current            1 SAB  4w0d               Past Medical History:   Diagnosis Date    Trauma     10 years old    Vascular abnormality     Right Arm        Past Surgical History:   Procedure Laterality Date    OTHER SURGICAL HISTORY Right     Arm sclerotherapy x5       No current facility-administered medications on file prior to encounter.  "    Current Outpatient Medications on File Prior to Encounter   Medication Sig Dispense Refill    prenatal vitamin (prenatal, CLASSIC, vitamin) tablet Take 1 tablet by mouth Daily.         Allergies   Allergen Reactions    Shellfish-Derived Products Anaphylaxis       Family History   Adopted: Yes   Problem Relation Age of Onset    Breast cancer Paternal Grandmother        Social History     Socioeconomic History    Marital status: Single    Number of children: 0    Highest education level: Some college, no degree   Tobacco Use    Smoking status: Never     Passive exposure: Never    Smokeless tobacco: Never   Vaping Use    Vaping Use: Never used   Substance and Sexual Activity    Alcohol use: Not Currently    Drug use: Not Currently     Comment: in highschool a couple times    Sexual activity: Yes     Partners: Male     Birth control/protection: None           Review of Systems   Constitutional:  Negative for chills, fatigue and fever.   HENT: Negative.     Eyes:  Negative for photophobia and visual disturbance.   Respiratory:  Negative for cough, chest tightness and shortness of breath.    Cardiovascular:  Negative for chest pain and leg swelling.   Gastrointestinal:  Positive for abdominal pain (intermittent abdominal pain c/w contractions). Negative for diarrhea, nausea and vomiting.   Genitourinary:  Positive for vaginal discharge (Complains of leaking fluid, states several large gushes since yesterday). Negative for dysuria, flank pain, hematuria, pelvic pain and vaginal bleeding.   Musculoskeletal:  Negative for back pain.   Neurological:  Negative for dizziness, seizures, weakness and headaches.        PHYSICAL EXAM:      VITAL SIGNS:  Vitals:    09/15/23 1046   BP: 117/79   Pulse: 95   Resp: 16   Temp: 98.6 °F (37 °C)   TempSrc: Oral   SpO2: 100%   Weight: 81.2 kg (179 lb)   Height: 165.1 cm (65\")        FHT'S:                   Baseline:  135 - 140 BPM  Variability:  Moderate = 6 - 25 BPM  Accelerations:  15 " x 15 accelerations present     Decelerations:  absent  Contractions:    irregular contractions present      Interpretation:   Reactive NST, CAT 1 tracing        PHYSICAL EXAM:    General: well developed; well nourished  no acute distress   Heart: Not performed.   Lungs: breathing is unlabored     Abdomen: soft, non-tender; no masses  no umbilical or inguinal hernias are present       Cervix: was examined by nurse  Cervical Dilation (cm): 0-1  Cervical Effacement: 60%  Fetal Station: -2   Contractions: irregular        Extremities: peripheral pulses normal, no pedal edema, no clubbing or cyanosis      LABS AND TESTING ORDERED:  Uterine and fetal monitoring  Urinalysis  ROM Plus    LAB RESULTS:    Recent Results (from the past 24 hour(s))   POC Urinalysis Dipstick    Collection Time: 23 11:30 AM    Specimen: Urine   Result Value Ref Range    Color Yellow Yellow, Straw, Dark Yellow, [place]o    Clarity, UA Clear Clear    Glucose, UA Negative Negative mg/dL    Protein, POC Negative Negative mg/dL       Lab Results   Component Value Date    ABO A 2023    RH Positive 2023       Lab Results   Component Value Date    STREPGPB Negative 2023                 Assessment & Plan   ASSESSMENT/PLAN:  Mayra Garner is a 23 y.o. female  at 37w2d who presented with possible rupture of membranes.   She was evaluated for rupture of membranes however no evidence for rupture of membranes was found with Pooling Absent and Negative ROM Plus .  Her cervix which was noted to be Cervical Dilation (cm): 0-1 cm/ Cervical Effacement: 60% % effaced/ vertex at Fetal Station: -2 station. She was noted to have a Reactive NST.   In view of no evidence for rupture of membranes and reassuring fetal monitoring she was discharged to home.    Final Impression:  Pregnancy at 37w2d    Encounter for suspected rupture of membranes, but no evidence of rupture of membranes  Pooling Absent and Negative ROM Plus   Reactive  "NST, CAT  1  tracing, Reassuring fetal status  Maternal vital signs were reviewed and were unremarkable                   Vitals:    09/15/23 1046   BP: 117/79   Pulse: 95   Resp: 16   Temp: 98.6 °F (37 °C)   TempSrc: Oral   SpO2: 100%   Weight: 81.2 kg (179 lb)   Height: 165.1 cm (65\")       3       PLAN:  Discharge to home  She will continue her home meds          Your medication list        CONTINUE taking these medications        Instructions Last Dose Given Next Dose Due   prenatal (CLASSIC) vitamin  tablet  Generic drug: prenatal vitamin      Take 1 tablet by mouth Daily.                She will follow up with Yolanda Sanz MD as scheduled and as needed  She has been instructed to return for contractions, vaginal bleeding, rupture of membranes, decreased fetal movement, or other concern  She may call the office or [facility] with questions.             I have spent 30 minutes including face to face time with the patient, greater than 50% in discussion of the diagnosis (counseling) and/or coordination of care.         Сергей Archuleta MD  9/15/2023  11:44 EDT  OB Hospitalist  Phone   [phone]  "

## 2023-09-15 NOTE — TELEPHONE ENCOUNTER
37 week ob pt of Dr Sanz    C/o cramping and abdominal tightening that is coming and going. She not concerned about this but she has now had 3 episodes where her panties are wet.  She isn't sure if it is fluid or urine.  It happened once yesterday, once in the middle of the night and once this morning.  Does she need to be checked or concerned?  Just wanted to make sure.

## 2023-09-16 LAB — BACTERIA SPEC AEROBE CULT: NO GROWTH

## 2023-09-21 ENCOUNTER — ROUTINE PRENATAL (OUTPATIENT)
Dept: OBSTETRICS AND GYNECOLOGY | Age: 23
End: 2023-09-21
Payer: COMMERCIAL

## 2023-09-21 VITALS — WEIGHT: 184 LBS | DIASTOLIC BLOOD PRESSURE: 66 MMHG | BODY MASS INDEX: 30.62 KG/M2 | SYSTOLIC BLOOD PRESSURE: 110 MMHG

## 2023-09-21 DIAGNOSIS — Z13.89 SCREENING FOR HEMATURIA OR PROTEINURIA: ICD-10-CM

## 2023-09-21 DIAGNOSIS — O23.43 URINARY TRACT INFECTION IN MOTHER DURING THIRD TRIMESTER OF PREGNANCY: ICD-10-CM

## 2023-09-21 DIAGNOSIS — Z3A.38 38 WEEKS GESTATION OF PREGNANCY: Primary | ICD-10-CM

## 2023-09-21 DIAGNOSIS — O26.893 PREGNANCY HEADACHE IN THIRD TRIMESTER: ICD-10-CM

## 2023-09-21 DIAGNOSIS — Q27.9 CONGENITAL MALFORMATION OF PERIPHERAL VASCULAR SYSTEM, UNSPECIFIED: ICD-10-CM

## 2023-09-21 DIAGNOSIS — Z82.79 FHX: CONGENITAL ANOMALY: ICD-10-CM

## 2023-09-21 DIAGNOSIS — Z28.39 MATERNAL VARICELLA, NON-IMMUNE: ICD-10-CM

## 2023-09-21 DIAGNOSIS — R51.9 PREGNANCY HEADACHE IN THIRD TRIMESTER: ICD-10-CM

## 2023-09-21 DIAGNOSIS — O09.899 MATERNAL VARICELLA, NON-IMMUNE: ICD-10-CM

## 2023-09-21 NOTE — PROGRESS NOTES
Mayra Garner, a 23 y.o.  at 38w1d, presents for OB follow-up.  She is doing well today.  Denies loss of fluid, vaginal bleeding.  Intermittent contractions but not super painful.  Endorses fetal movements.    Objective  BP Readings from Last 3 Encounters:   23 110/66   09/15/23 117/79   23 120/72      Wt Readings from Last 3 Encounters:   23 83.5 kg (184 lb)   09/15/23 81.2 kg (179 lb)   23 82 kg (180 lb 12.8 oz)      Total weight gain this pregnancy: 22.2 kg (49 lb)    General: Awake, alert, no apparent distress  Respiratory: No increased work of breathing  Abdomen: Fundus soft and nontender  Extremity: Nontender, no edema  : cervix /-2, med, mid    A/P  Diagnoses and all orders for this visit:    1. 38 weeks gestation of pregnancy (Primary)  Overview:  -PNL: 1hr GTT normal, GBS neg  -Pap: NIL 3/2/23  -Genetics: cfDNA/carrier screen/msAFP neg, anatomy Us normal  -Imm: RI, VNI (h/o vaccine), s/p tdap, s/p covid vaccine/booster, declines flu shot  -Contraception: likely does not want any  -Birthplan: no epidural, boy, circ, breast  -Care coordination: accepts blood transfusions, no latex allergy, call schedule reviewed  -Elective IOL scheduled 10/2, arrive at 1600, Sanz will manage      Orders:  -     POC Urinalysis Dipstick    2. Screening for hematuria or proteinuria  -     POC Urinalysis Dipstick    3. Congenital malformation of peripheral vascular system, unspecified  Overview:  -Patient reports vascular malformation in upper right arm.  Present since birth but not genetic.  S/p 5 repairs.  Told it may change with grow with pregnancy.  Unsure of any other details.  -S/p MFM consult, consider lovenox ppx if pain develops but otherwise no intervention  -Plan for serial growth Us, normal at 36 wga      4. FHx: congenital anomaly  Overview:  -Patient reports brother passed away at birth from cardiac malformation.  She was soon put in foster system and is unsure of any other  details.  -S/p MFM consult, anatomy Us normal, no echo needed      5. Urinary tract infection in mother during third trimester of pregnancy  Overview:  -urine cx pos 8/9/23, rx macrobid sent  -repeat urine cx neg      6. Maternal varicella, non-immune  Overview:  H/o vaccine      7. Pregnancy headache in third trimester  Overview:  -Persistent headache the last few days despite rest and hydration, no consistent URI symptoms  -Rx mag ox sent, also can use tylenol prn  -Improved          Labor precautions reviewed  Return in about 1 week (around 9/28/2023) for Next f/u.    Yolanda Sanz MD

## 2023-09-28 ENCOUNTER — ROUTINE PRENATAL (OUTPATIENT)
Dept: OBSTETRICS AND GYNECOLOGY | Age: 23
End: 2023-09-28
Payer: COMMERCIAL

## 2023-09-28 VITALS — BODY MASS INDEX: 31.38 KG/M2 | WEIGHT: 188.6 LBS | SYSTOLIC BLOOD PRESSURE: 110 MMHG | DIASTOLIC BLOOD PRESSURE: 70 MMHG

## 2023-09-28 DIAGNOSIS — Z28.39 MATERNAL VARICELLA, NON-IMMUNE: ICD-10-CM

## 2023-09-28 DIAGNOSIS — Q27.9 CONGENITAL MALFORMATION OF PERIPHERAL VASCULAR SYSTEM, UNSPECIFIED: ICD-10-CM

## 2023-09-28 DIAGNOSIS — O09.899 MATERNAL VARICELLA, NON-IMMUNE: ICD-10-CM

## 2023-09-28 DIAGNOSIS — Z3A.39 39 WEEKS GESTATION OF PREGNANCY: Primary | ICD-10-CM

## 2023-09-28 DIAGNOSIS — Z82.79 FHX: CONGENITAL ANOMALY: ICD-10-CM

## 2023-09-28 LAB
BILIRUB BLD-MCNC: NEGATIVE MG/DL
CLARITY, POC: CLEAR
COLOR UR: YELLOW
GLUCOSE UR STRIP-MCNC: NEGATIVE MG/DL
KETONES UR QL: NEGATIVE
LEUKOCYTE EST, POC: NEGATIVE
NITRITE UR-MCNC: NEGATIVE MG/ML
PH UR: 6.5 [PH] (ref 5–8)
PROT UR STRIP-MCNC: NEGATIVE MG/DL
RBC # UR STRIP: NEGATIVE /UL
SP GR UR: 1.02 (ref 1–1.03)
UROBILINOGEN UR QL: NORMAL

## 2023-09-28 NOTE — PROGRESS NOTES
Chief Complaint   Patient presents with    Routine Prenatal Visit     39 weeks     CC : foggy urine couple days , burning        HPI: 23 y.o.  at 39w1d     Doing well  Reports good FM  Denies LOF, bleeding or ctx's  C/o foggy urine and some dysuria x1, no longer really noticing  Pt of Dr. Sanz     Vitals:    23 0943   BP: 110/70   Weight: 85.5 kg (188 lb 9.6 oz)       ROS:  GI:  Negative  : dysuria  Pulmonary: Negative     A/P  1. Intrauterine pregnancy at 39w1d   2. Pregnancy Risk:  NORMAL    Diagnoses and all orders for this visit:    1. 39 weeks gestation of pregnancy (Primary)  -     POC Urinalysis Dipstick    2. Congenital malformation of peripheral vascular system, unspecified    3. FHx: congenital anomaly    4. Maternal varicella, non-immune        -----------------------  PLAN:   Negative UA  Labor warnings/FKC  IOL scheduled 10/2  No follow-ups on file.      Marcela Buitrago, APRN  2023 10:04 EDT

## 2023-10-02 ENCOUNTER — HOSPITAL ENCOUNTER (INPATIENT)
Facility: HOSPITAL | Age: 23
LOS: 3 days | Discharge: HOME | End: 2023-10-05
Attending: STUDENT IN AN ORGANIZED HEALTH CARE EDUCATION/TRAINING PROGRAM | Admitting: STUDENT IN AN ORGANIZED HEALTH CARE EDUCATION/TRAINING PROGRAM
Payer: COMMERCIAL

## 2023-10-02 ENCOUNTER — PREP FOR SURGERY (OUTPATIENT)
Dept: OTHER | Facility: HOSPITAL | Age: 23
End: 2023-10-02
Payer: COMMERCIAL

## 2023-10-02 ENCOUNTER — HOSPITAL ENCOUNTER (OUTPATIENT)
Dept: LABOR AND DELIVERY | Facility: HOSPITAL | Age: 23
Discharge: HOME | End: 2023-10-02
Payer: COMMERCIAL

## 2023-10-02 DIAGNOSIS — Q27.9 CONGENITAL MALFORMATION OF PERIPHERAL VASCULAR SYSTEM, UNSPECIFIED: Primary | ICD-10-CM

## 2023-10-02 DIAGNOSIS — Q27.9 CONGENITAL MALFORMATION OF PERIPHERAL VASCULAR SYSTEM, UNSPECIFIED: ICD-10-CM

## 2023-10-02 DIAGNOSIS — Z82.79 FHX: CONGENITAL ANOMALY: ICD-10-CM

## 2023-10-02 DIAGNOSIS — O23.43 URINARY TRACT INFECTION IN MOTHER DURING THIRD TRIMESTER OF PREGNANCY: ICD-10-CM

## 2023-10-02 PROBLEM — Z3A.39 39 WEEKS GESTATION OF PREGNANCY: Status: ACTIVE | Noted: 2023-10-02

## 2023-10-02 LAB
ABO GROUP BLD: NORMAL
BASOPHILS # BLD AUTO: 0.02 10*3/MM3 (ref 0–0.2)
BASOPHILS NFR BLD AUTO: 0.3 % (ref 0–1.5)
BLD GP AB SCN SERPL QL: NEGATIVE
DEPRECATED RDW RBC AUTO: 41.9 FL (ref 37–54)
EOSINOPHIL # BLD AUTO: 0.05 10*3/MM3 (ref 0–0.4)
EOSINOPHIL NFR BLD AUTO: 0.6 % (ref 0.3–6.2)
ERYTHROCYTE [DISTWIDTH] IN BLOOD BY AUTOMATED COUNT: 12.6 % (ref 12.3–15.4)
HCT VFR BLD AUTO: 37.2 % (ref 34–46.6)
HGB BLD-MCNC: 12.6 G/DL (ref 12–15.9)
IMM GRANULOCYTES # BLD AUTO: 0.03 10*3/MM3 (ref 0–0.05)
IMM GRANULOCYTES NFR BLD AUTO: 0.4 % (ref 0–0.5)
LYMPHOCYTES # BLD AUTO: 2.39 10*3/MM3 (ref 0.7–3.1)
LYMPHOCYTES NFR BLD AUTO: 30.4 % (ref 19.6–45.3)
MCH RBC QN AUTO: 31 PG (ref 26.6–33)
MCHC RBC AUTO-ENTMCNC: 33.9 G/DL (ref 31.5–35.7)
MCV RBC AUTO: 91.4 FL (ref 79–97)
MONOCYTES # BLD AUTO: 0.58 10*3/MM3 (ref 0.1–0.9)
MONOCYTES NFR BLD AUTO: 7.4 % (ref 5–12)
NEUTROPHILS NFR BLD AUTO: 4.8 10*3/MM3 (ref 1.7–7)
NEUTROPHILS NFR BLD AUTO: 60.9 % (ref 42.7–76)
NRBC BLD AUTO-RTO: 0 /100 WBC (ref 0–0.2)
PLATELET # BLD AUTO: 218 10*3/MM3 (ref 140–450)
PMV BLD AUTO: 9.8 FL (ref 6–12)
RBC # BLD AUTO: 4.07 10*6/MM3 (ref 3.77–5.28)
RH BLD: POSITIVE
T&S EXPIRATION DATE: NORMAL
WBC NRBC COR # BLD: 7.87 10*3/MM3 (ref 3.4–10.8)

## 2023-10-02 PROCEDURE — 86850 RBC ANTIBODY SCREEN: CPT | Performed by: STUDENT IN AN ORGANIZED HEALTH CARE EDUCATION/TRAINING PROGRAM

## 2023-10-02 PROCEDURE — 3E033VJ INTRODUCTION OF OTHER HORMONE INTO PERIPHERAL VEIN, PERCUTANEOUS APPROACH: ICD-10-PCS | Performed by: STUDENT IN AN ORGANIZED HEALTH CARE EDUCATION/TRAINING PROGRAM

## 2023-10-02 PROCEDURE — 85025 COMPLETE CBC W/AUTO DIFF WBC: CPT | Performed by: STUDENT IN AN ORGANIZED HEALTH CARE EDUCATION/TRAINING PROGRAM

## 2023-10-02 PROCEDURE — 86901 BLOOD TYPING SEROLOGIC RH(D): CPT | Performed by: STUDENT IN AN ORGANIZED HEALTH CARE EDUCATION/TRAINING PROGRAM

## 2023-10-02 PROCEDURE — 86900 BLOOD TYPING SEROLOGIC ABO: CPT | Performed by: STUDENT IN AN ORGANIZED HEALTH CARE EDUCATION/TRAINING PROGRAM

## 2023-10-02 RX ORDER — CARBOPROST TROMETHAMINE 250 UG/ML
250 INJECTION, SOLUTION INTRAMUSCULAR
Status: CANCELLED | OUTPATIENT
Start: 2023-10-02

## 2023-10-02 RX ORDER — DIPHENHYDRAMINE HCL 25 MG
25 CAPSULE ORAL NIGHTLY PRN
Status: CANCELLED | OUTPATIENT
Start: 2023-10-02

## 2023-10-02 RX ORDER — LIDOCAINE HYDROCHLORIDE 10 MG/ML
0.5 INJECTION, SOLUTION INFILTRATION; PERINEURAL ONCE AS NEEDED
Status: DISCONTINUED | OUTPATIENT
Start: 2023-10-02 | End: 2023-10-03

## 2023-10-02 RX ORDER — ONDANSETRON 2 MG/ML
4 INJECTION INTRAMUSCULAR; INTRAVENOUS EVERY 6 HOURS PRN
Status: DISCONTINUED | OUTPATIENT
Start: 2023-10-02 | End: 2023-10-03

## 2023-10-02 RX ORDER — MISOPROSTOL 200 UG/1
800 TABLET ORAL ONCE AS NEEDED
Status: CANCELLED | OUTPATIENT
Start: 2023-10-02

## 2023-10-02 RX ORDER — DIPHENHYDRAMINE HYDROCHLORIDE 50 MG/ML
25 INJECTION INTRAMUSCULAR; INTRAVENOUS NIGHTLY PRN
Status: CANCELLED | OUTPATIENT
Start: 2023-10-02

## 2023-10-02 RX ORDER — TERBUTALINE SULFATE 1 MG/ML
0.25 INJECTION, SOLUTION SUBCUTANEOUS AS NEEDED
Status: CANCELLED | OUTPATIENT
Start: 2023-10-02

## 2023-10-02 RX ORDER — SODIUM CHLORIDE, SODIUM LACTATE, POTASSIUM CHLORIDE, CALCIUM CHLORIDE 600; 310; 30; 20 MG/100ML; MG/100ML; MG/100ML; MG/100ML
125 INJECTION, SOLUTION INTRAVENOUS CONTINUOUS
Status: CANCELLED | OUTPATIENT
Start: 2023-10-02

## 2023-10-02 RX ORDER — METHYLERGONOVINE MALEATE 0.2 MG/ML
200 INJECTION INTRAVENOUS ONCE AS NEEDED
Status: CANCELLED | OUTPATIENT
Start: 2023-10-02

## 2023-10-02 RX ORDER — OXYTOCIN/0.9 % SODIUM CHLORIDE 30/500 ML
999 PLASTIC BAG, INJECTION (ML) INTRAVENOUS ONCE
Status: CANCELLED | OUTPATIENT
Start: 2023-10-02 | End: 2023-10-02

## 2023-10-02 RX ORDER — LIDOCAINE HYDROCHLORIDE 10 MG/ML
0.5 INJECTION, SOLUTION INFILTRATION; PERINEURAL ONCE AS NEEDED
Status: CANCELLED | OUTPATIENT
Start: 2023-10-02

## 2023-10-02 RX ORDER — TERBUTALINE SULFATE 1 MG/ML
0.25 INJECTION, SOLUTION SUBCUTANEOUS AS NEEDED
Status: DISCONTINUED | OUTPATIENT
Start: 2023-10-02 | End: 2023-10-03

## 2023-10-02 RX ORDER — SODIUM CHLORIDE 0.9 % (FLUSH) 0.9 %
10 SYRINGE (ML) INJECTION EVERY 12 HOURS SCHEDULED
Status: DISCONTINUED | OUTPATIENT
Start: 2023-10-02 | End: 2023-10-03

## 2023-10-02 RX ORDER — SODIUM CHLORIDE 9 MG/ML
40 INJECTION, SOLUTION INTRAVENOUS AS NEEDED
Status: CANCELLED | OUTPATIENT
Start: 2023-10-02

## 2023-10-02 RX ORDER — DIPHENHYDRAMINE HYDROCHLORIDE 50 MG/ML
25 INJECTION INTRAMUSCULAR; INTRAVENOUS NIGHTLY PRN
Status: DISCONTINUED | OUTPATIENT
Start: 2023-10-02 | End: 2023-10-03

## 2023-10-02 RX ORDER — ONDANSETRON 4 MG/1
4 TABLET, FILM COATED ORAL EVERY 6 HOURS PRN
Status: CANCELLED | OUTPATIENT
Start: 2023-10-02

## 2023-10-02 RX ORDER — SODIUM CHLORIDE 0.9 % (FLUSH) 0.9 %
10 SYRINGE (ML) INJECTION AS NEEDED
Status: CANCELLED | OUTPATIENT
Start: 2023-10-02

## 2023-10-02 RX ORDER — ACETAMINOPHEN 325 MG/1
650 TABLET ORAL EVERY 4 HOURS PRN
Status: CANCELLED | OUTPATIENT
Start: 2023-10-02

## 2023-10-02 RX ORDER — SODIUM CHLORIDE 0.9 % (FLUSH) 0.9 %
10 SYRINGE (ML) INJECTION EVERY 12 HOURS SCHEDULED
Status: CANCELLED | OUTPATIENT
Start: 2023-10-02

## 2023-10-02 RX ORDER — DIPHENHYDRAMINE HCL 25 MG
25 CAPSULE ORAL NIGHTLY PRN
Status: DISCONTINUED | OUTPATIENT
Start: 2023-10-02 | End: 2023-10-03

## 2023-10-02 RX ORDER — SODIUM CHLORIDE 9 MG/ML
40 INJECTION, SOLUTION INTRAVENOUS AS NEEDED
Status: DISCONTINUED | OUTPATIENT
Start: 2023-10-02 | End: 2023-10-03

## 2023-10-02 RX ORDER — ONDANSETRON 2 MG/ML
4 INJECTION INTRAMUSCULAR; INTRAVENOUS EVERY 6 HOURS PRN
Status: CANCELLED | OUTPATIENT
Start: 2023-10-02

## 2023-10-02 RX ORDER — ONDANSETRON 4 MG/1
4 TABLET, FILM COATED ORAL EVERY 6 HOURS PRN
Status: DISCONTINUED | OUTPATIENT
Start: 2023-10-02 | End: 2023-10-03

## 2023-10-02 RX ORDER — SODIUM CHLORIDE, SODIUM LACTATE, POTASSIUM CHLORIDE, CALCIUM CHLORIDE 600; 310; 30; 20 MG/100ML; MG/100ML; MG/100ML; MG/100ML
125 INJECTION, SOLUTION INTRAVENOUS CONTINUOUS
Status: DISCONTINUED | OUTPATIENT
Start: 2023-10-02 | End: 2023-10-03

## 2023-10-02 RX ORDER — OXYTOCIN/0.9 % SODIUM CHLORIDE 30/500 ML
250 PLASTIC BAG, INJECTION (ML) INTRAVENOUS CONTINUOUS
Status: CANCELLED | OUTPATIENT
Start: 2023-10-02 | End: 2023-10-02

## 2023-10-02 RX ORDER — ACETAMINOPHEN 325 MG/1
650 TABLET ORAL EVERY 4 HOURS PRN
Status: DISCONTINUED | OUTPATIENT
Start: 2023-10-02 | End: 2023-10-03

## 2023-10-02 RX ORDER — IBUPROFEN 600 MG/1
600 TABLET ORAL EVERY 6 HOURS PRN
Status: CANCELLED | OUTPATIENT
Start: 2023-10-02

## 2023-10-02 RX ORDER — SODIUM CHLORIDE 0.9 % (FLUSH) 0.9 %
10 SYRINGE (ML) INJECTION AS NEEDED
Status: DISCONTINUED | OUTPATIENT
Start: 2023-10-02 | End: 2023-10-03

## 2023-10-02 RX ADMIN — DINOPROSTONE 10 MG: 10 INSERT VAGINAL at 21:51

## 2023-10-03 ENCOUNTER — ANESTHESIA (OUTPATIENT)
Dept: LABOR AND DELIVERY | Facility: HOSPITAL | Age: 23
End: 2023-10-03
Payer: COMMERCIAL

## 2023-10-03 ENCOUNTER — ANESTHESIA EVENT (OUTPATIENT)
Dept: LABOR AND DELIVERY | Facility: HOSPITAL | Age: 23
End: 2023-10-03
Payer: COMMERCIAL

## 2023-10-03 PROBLEM — Z3A.39 39 WEEKS GESTATION OF PREGNANCY: Status: ACTIVE | Noted: 2023-03-02

## 2023-10-03 PROBLEM — Z3A.39 39 WEEKS GESTATION OF PREGNANCY: Status: RESOLVED | Noted: 2023-10-02 | Resolved: 2023-10-03

## 2023-10-03 PROCEDURE — 10907ZC DRAINAGE OF AMNIOTIC FLUID, THERAPEUTIC FROM PRODUCTS OF CONCEPTION, VIA NATURAL OR ARTIFICIAL OPENING: ICD-10-PCS | Performed by: STUDENT IN AN ORGANIZED HEALTH CARE EDUCATION/TRAINING PROGRAM

## 2023-10-03 PROCEDURE — 25810000003 LACTATED RINGERS PER 1000 ML: Performed by: STUDENT IN AN ORGANIZED HEALTH CARE EDUCATION/TRAINING PROGRAM

## 2023-10-03 PROCEDURE — 59410 OBSTETRICAL CARE: CPT | Performed by: STUDENT IN AN ORGANIZED HEALTH CARE EDUCATION/TRAINING PROGRAM

## 2023-10-03 PROCEDURE — 25010000002 ROPIVACAINE PER 1 MG: Performed by: STUDENT IN AN ORGANIZED HEALTH CARE EDUCATION/TRAINING PROGRAM

## 2023-10-03 PROCEDURE — C1755 CATHETER, INTRASPINAL: HCPCS | Performed by: STUDENT IN AN ORGANIZED HEALTH CARE EDUCATION/TRAINING PROGRAM

## 2023-10-03 PROCEDURE — 25810000003 SODIUM CHLORIDE 0.9 % SOLUTION: Performed by: STUDENT IN AN ORGANIZED HEALTH CARE EDUCATION/TRAINING PROGRAM

## 2023-10-03 PROCEDURE — 25010000002 MORPHINE PER 10 MG: Performed by: OBSTETRICS & GYNECOLOGY

## 2023-10-03 PROCEDURE — 0HQ9XZZ REPAIR PERINEUM SKIN, EXTERNAL APPROACH: ICD-10-PCS | Performed by: STUDENT IN AN ORGANIZED HEALTH CARE EDUCATION/TRAINING PROGRAM

## 2023-10-03 PROCEDURE — S0260 H&P FOR SURGERY: HCPCS | Performed by: STUDENT IN AN ORGANIZED HEALTH CARE EDUCATION/TRAINING PROGRAM

## 2023-10-03 RX ORDER — SODIUM CHLORIDE 9 MG/ML
50 INJECTION, SOLUTION INTRAVENOUS CONTINUOUS
Status: DISCONTINUED | OUTPATIENT
Start: 2023-10-03 | End: 2023-10-03

## 2023-10-03 RX ORDER — DIPHENHYDRAMINE HCL 25 MG
50 CAPSULE ORAL NIGHTLY PRN
Status: DISCONTINUED | OUTPATIENT
Start: 2023-10-03 | End: 2023-10-05 | Stop reason: HOSPADM

## 2023-10-03 RX ORDER — MORPHINE SULFATE 2 MG/ML
1 INJECTION, SOLUTION INTRAMUSCULAR; INTRAVENOUS
Status: DISCONTINUED | OUTPATIENT
Start: 2023-10-03 | End: 2023-10-03

## 2023-10-03 RX ORDER — PROMETHAZINE HYDROCHLORIDE 12.5 MG/1
12.5 TABLET ORAL EVERY 4 HOURS PRN
Status: DISCONTINUED | OUTPATIENT
Start: 2023-10-03 | End: 2023-10-05 | Stop reason: HOSPADM

## 2023-10-03 RX ORDER — OXYTOCIN/0.9 % SODIUM CHLORIDE 30/500 ML
250 PLASTIC BAG, INJECTION (ML) INTRAVENOUS CONTINUOUS
Status: ACTIVE | OUTPATIENT
Start: 2023-10-03 | End: 2023-10-03

## 2023-10-03 RX ORDER — IBUPROFEN 600 MG/1
600 TABLET ORAL EVERY 6 HOURS PRN
Status: DISCONTINUED | OUTPATIENT
Start: 2023-10-03 | End: 2023-10-05 | Stop reason: HOSPADM

## 2023-10-03 RX ORDER — MISOPROSTOL 200 UG/1
800 TABLET ORAL ONCE AS NEEDED
Status: DISCONTINUED | OUTPATIENT
Start: 2023-10-03 | End: 2023-10-03

## 2023-10-03 RX ORDER — ONDANSETRON 4 MG/1
4 TABLET, FILM COATED ORAL EVERY 6 HOURS PRN
Status: DISCONTINUED | OUTPATIENT
Start: 2023-10-03 | End: 2023-10-03 | Stop reason: HOSPADM

## 2023-10-03 RX ORDER — METHYLERGONOVINE MALEATE 0.2 MG/ML
200 INJECTION INTRAVENOUS ONCE AS NEEDED
Status: DISCONTINUED | OUTPATIENT
Start: 2023-10-03 | End: 2023-10-03

## 2023-10-03 RX ORDER — HYDROCORTISONE 25 MG/G
CREAM TOPICAL AS NEEDED
Status: DISCONTINUED | OUTPATIENT
Start: 2023-10-03 | End: 2023-10-05 | Stop reason: HOSPADM

## 2023-10-03 RX ORDER — DOCUSATE SODIUM 100 MG/1
100 CAPSULE, LIQUID FILLED ORAL 2 TIMES DAILY
Status: DISCONTINUED | OUTPATIENT
Start: 2023-10-03 | End: 2023-10-05 | Stop reason: HOSPADM

## 2023-10-03 RX ORDER — IBUPROFEN 600 MG/1
600 TABLET ORAL EVERY 6 HOURS PRN
Status: DISCONTINUED | OUTPATIENT
Start: 2023-10-03 | End: 2023-10-03 | Stop reason: HOSPADM

## 2023-10-03 RX ORDER — ONDANSETRON 2 MG/ML
4 INJECTION INTRAMUSCULAR; INTRAVENOUS EVERY 6 HOURS PRN
Status: DISCONTINUED | OUTPATIENT
Start: 2023-10-03 | End: 2023-10-03 | Stop reason: HOSPADM

## 2023-10-03 RX ORDER — CARBOPROST TROMETHAMINE 250 UG/ML
250 INJECTION, SOLUTION INTRAMUSCULAR
Status: DISCONTINUED | OUTPATIENT
Start: 2023-10-03 | End: 2023-10-03

## 2023-10-03 RX ORDER — ONDANSETRON 2 MG/ML
4 INJECTION INTRAMUSCULAR; INTRAVENOUS ONCE AS NEEDED
Status: DISCONTINUED | OUTPATIENT
Start: 2023-10-03 | End: 2023-10-03

## 2023-10-03 RX ORDER — ONDANSETRON 4 MG/1
4 TABLET, FILM COATED ORAL EVERY 8 HOURS PRN
Status: DISCONTINUED | OUTPATIENT
Start: 2023-10-03 | End: 2023-10-05 | Stop reason: HOSPADM

## 2023-10-03 RX ORDER — HYDROCODONE BITARTRATE AND ACETAMINOPHEN 5; 325 MG/1; MG/1
1 TABLET ORAL EVERY 4 HOURS PRN
Status: DISCONTINUED | OUTPATIENT
Start: 2023-10-03 | End: 2023-10-05 | Stop reason: HOSPADM

## 2023-10-03 RX ORDER — OXYTOCIN/0.9 % SODIUM CHLORIDE 30/500 ML
125 PLASTIC BAG, INJECTION (ML) INTRAVENOUS CONTINUOUS PRN
Status: COMPLETED | OUTPATIENT
Start: 2023-10-03 | End: 2023-10-03

## 2023-10-03 RX ORDER — EPHEDRINE SULFATE 50 MG/ML
5 INJECTION, SOLUTION INTRAVENOUS
Status: DISCONTINUED | OUTPATIENT
Start: 2023-10-03 | End: 2023-10-03

## 2023-10-03 RX ORDER — BISACODYL 10 MG
10 SUPPOSITORY, RECTAL RECTAL DAILY PRN
Status: DISCONTINUED | OUTPATIENT
Start: 2023-10-04 | End: 2023-10-05 | Stop reason: HOSPADM

## 2023-10-03 RX ORDER — NALOXONE HCL 0.4 MG/ML
0.4 VIAL (ML) INJECTION
Status: DISCONTINUED | OUTPATIENT
Start: 2023-10-03 | End: 2023-10-03

## 2023-10-03 RX ORDER — FENTANYL CIT 0.2 MG/100ML-ROPIV 0.2%-NACL 0.9% EPIDURAL INJ 2/0.2 MCG/ML-%
10 SOLUTION INJECTION CONTINUOUS
Status: DISCONTINUED | OUTPATIENT
Start: 2023-10-03 | End: 2023-10-03

## 2023-10-03 RX ORDER — HYDROCODONE BITARTRATE AND ACETAMINOPHEN 10; 325 MG/1; MG/1
1 TABLET ORAL EVERY 4 HOURS PRN
Status: DISCONTINUED | OUTPATIENT
Start: 2023-10-03 | End: 2023-10-05 | Stop reason: HOSPADM

## 2023-10-03 RX ORDER — FAMOTIDINE 10 MG/ML
20 INJECTION, SOLUTION INTRAVENOUS ONCE AS NEEDED
Status: DISCONTINUED | OUTPATIENT
Start: 2023-10-03 | End: 2023-10-03

## 2023-10-03 RX ORDER — OXYTOCIN/0.9 % SODIUM CHLORIDE 30/500 ML
2-20 PLASTIC BAG, INJECTION (ML) INTRAVENOUS
Status: DISCONTINUED | OUTPATIENT
Start: 2023-10-03 | End: 2023-10-03

## 2023-10-03 RX ORDER — PROMETHAZINE HYDROCHLORIDE 25 MG/1
25 TABLET ORAL EVERY 6 HOURS PRN
Status: DISCONTINUED | OUTPATIENT
Start: 2023-10-03 | End: 2023-10-05 | Stop reason: HOSPADM

## 2023-10-03 RX ORDER — ONDANSETRON 2 MG/ML
4 INJECTION INTRAMUSCULAR; INTRAVENOUS EVERY 6 HOURS PRN
Status: DISCONTINUED | OUTPATIENT
Start: 2023-10-03 | End: 2023-10-05 | Stop reason: HOSPADM

## 2023-10-03 RX ORDER — PROMETHAZINE HYDROCHLORIDE 12.5 MG/1
12.5 SUPPOSITORY RECTAL EVERY 6 HOURS PRN
Status: DISCONTINUED | OUTPATIENT
Start: 2023-10-03 | End: 2023-10-05 | Stop reason: HOSPADM

## 2023-10-03 RX ORDER — MISOPROSTOL 200 UG/1
600 TABLET ORAL ONCE AS NEEDED
Status: DISCONTINUED | OUTPATIENT
Start: 2023-10-03 | End: 2023-10-05 | Stop reason: HOSPADM

## 2023-10-03 RX ORDER — CARBOPROST TROMETHAMINE 250 UG/ML
250 INJECTION, SOLUTION INTRAMUSCULAR ONCE AS NEEDED
Status: DISCONTINUED | OUTPATIENT
Start: 2023-10-03 | End: 2023-10-05 | Stop reason: HOSPADM

## 2023-10-03 RX ORDER — SODIUM CHLORIDE 0.9 % (FLUSH) 0.9 %
1-10 SYRINGE (ML) INJECTION AS NEEDED
Status: DISCONTINUED | OUTPATIENT
Start: 2023-10-03 | End: 2023-10-05 | Stop reason: HOSPADM

## 2023-10-03 RX ORDER — DIPHENHYDRAMINE HYDROCHLORIDE 50 MG/ML
25 INJECTION INTRAMUSCULAR; INTRAVENOUS NIGHTLY PRN
Status: DISCONTINUED | OUTPATIENT
Start: 2023-10-03 | End: 2023-10-03 | Stop reason: HOSPADM

## 2023-10-03 RX ORDER — OXYTOCIN/0.9 % SODIUM CHLORIDE 30/500 ML
999 PLASTIC BAG, INJECTION (ML) INTRAVENOUS ONCE
Status: DISCONTINUED | OUTPATIENT
Start: 2023-10-03 | End: 2023-10-03 | Stop reason: HOSPADM

## 2023-10-03 RX ORDER — ROPIVACAINE HYDROCHLORIDE 2 MG/ML
INJECTION, SOLUTION EPIDURAL; INFILTRATION; PERINEURAL AS NEEDED
Status: DISCONTINUED | OUTPATIENT
Start: 2023-10-03 | End: 2023-10-03 | Stop reason: SURG

## 2023-10-03 RX ORDER — ACETAMINOPHEN 325 MG/1
650 TABLET ORAL EVERY 6 HOURS PRN
Status: DISCONTINUED | OUTPATIENT
Start: 2023-10-03 | End: 2023-10-05 | Stop reason: HOSPADM

## 2023-10-03 RX ORDER — DIPHENHYDRAMINE HYDROCHLORIDE 50 MG/ML
12.5 INJECTION INTRAMUSCULAR; INTRAVENOUS EVERY 8 HOURS PRN
Status: DISCONTINUED | OUTPATIENT
Start: 2023-10-03 | End: 2023-10-03

## 2023-10-03 RX ORDER — DIPHENHYDRAMINE HCL 25 MG
25 CAPSULE ORAL NIGHTLY PRN
Status: DISCONTINUED | OUTPATIENT
Start: 2023-10-03 | End: 2023-10-03 | Stop reason: HOSPADM

## 2023-10-03 RX ADMIN — LIDOCAINE HYDROCHLORIDE 3 ML: 10; .005 INJECTION, SOLUTION EPIDURAL; INFILTRATION; INTRACAUDAL; PERINEURAL at 07:18

## 2023-10-03 RX ADMIN — IBUPROFEN 600 MG: 600 TABLET, FILM COATED ORAL at 17:09

## 2023-10-03 RX ADMIN — Medication 2 MILLI-UNITS/MIN: at 11:08

## 2023-10-03 RX ADMIN — SODIUM CHLORIDE, POTASSIUM CHLORIDE, SODIUM LACTATE AND CALCIUM CHLORIDE 999 ML/HR: 600; 310; 30; 20 INJECTION, SOLUTION INTRAVENOUS at 09:36

## 2023-10-03 RX ADMIN — MORPHINE SULFATE 1 MG: 2 INJECTION, SOLUTION INTRAMUSCULAR; INTRAVENOUS at 05:13

## 2023-10-03 RX ADMIN — Medication 125 ML/HR: at 14:38

## 2023-10-03 RX ADMIN — SODIUM CHLORIDE, POTASSIUM CHLORIDE, SODIUM LACTATE AND CALCIUM CHLORIDE 125 ML/HR: 600; 310; 30; 20 INJECTION, SOLUTION INTRAVENOUS at 11:54

## 2023-10-03 RX ADMIN — Medication 10 ML/HR: at 07:21

## 2023-10-03 RX ADMIN — MORPHINE SULFATE 1 MG: 2 INJECTION, SOLUTION INTRAMUSCULAR; INTRAVENOUS at 03:48

## 2023-10-03 RX ADMIN — SODIUM CHLORIDE, POTASSIUM CHLORIDE, SODIUM LACTATE AND CALCIUM CHLORIDE 125 ML/HR: 600; 310; 30; 20 INJECTION, SOLUTION INTRAVENOUS at 02:10

## 2023-10-03 RX ADMIN — SODIUM CHLORIDE 300 ML: 9 INJECTION, SOLUTION INTRAVENOUS at 10:12

## 2023-10-03 RX ADMIN — Medication: at 19:59

## 2023-10-03 RX ADMIN — ROPIVACAINE HYDROCHLORIDE 8 ML: 2 INJECTION, SOLUTION EPIDURAL; INFILTRATION at 07:21

## 2023-10-03 RX ADMIN — DOCUSATE SODIUM 100 MG: 100 CAPSULE, LIQUID FILLED ORAL at 20:00

## 2023-10-03 NOTE — ANESTHESIA PREPROCEDURE EVALUATION
Anesthesia Evaluation     Patient summary reviewed and Nursing notes reviewed   NPO Solid Status: > 8 hours  NPO Liquid Status: > 2 hours           Airway   Mallampati: I  TM distance: >3 FB  Neck ROM: full  No difficulty expected  Dental - normal exam     Pulmonary - negative pulmonary ROS and normal exam   Cardiovascular - negative cardio ROS and normal exam        Neuro/Psych- negative ROS  GI/Hepatic/Renal/Endo - negative ROS     Musculoskeletal (-) negative ROS    Abdominal    Substance History - negative use     OB/GYN    (+) Pregnant        Other        ROS/Med Hx Other: Rt arm vascular anomaly      Phys Exam Other: Pregnant              Anesthesia Plan    ASA 2     epidural     (Intrauterine pregnancy at 39w6d)    Anesthetic plan, risks, benefits, and alternatives have been provided, discussed and informed consent has been obtained with: patient.    CODE STATUS:    Level Of Support Discussed With: Patient  Code Status (Patient has no pulse and is not breathing): CPR (Attempt to Resuscitate)  Medical Interventions (Patient has pulse or is breathing): Full Support

## 2023-10-03 NOTE — LACTATION NOTE
This note was copied from a baby's chart.  P1 T - new admission.  Mom reports that baby latched & fed for 20 mins after delivery but then was sleepy when she just tried to feed about 15 mins ago.  She reports baby to have good latch & bursts of sucking with intermittent pauses.  She has an insurance provided breast pump & also purchased a hands free pump.  Encouraged review of education in provided booklet at bedside, particularly hand expression.    Education provided:  feeding cues; frequency/duration; rousing sleepy baby; diaper expectations; milk production in relation to infant’s small stomach size; drops of colostrum being normal the first few days progressing to increasing amounts of milk & eventually full supply 3-5 days after delivery; signs of good latch; breast massage & hand expression.  Verbalized understanding.     Mother denies questions/concerns at this time.  Encouraged to call for help when needed.  LC names/#'s placed on WB & informed that LC is here until 2300 tonight.

## 2023-10-03 NOTE — L&D DELIVERY NOTE
[facility]   Vaginal Delivery Note    Patient Name: Mayra Garner  : 2000  MRN: [id number]    Date of Delivery: 10/3/2023     Diagnosis     Pre & Post-Delivery:  Intrauterine pregnancy at 39w6d  Labor status: Induced Onset of Labor     Congenital malformation of peripheral vascular system, unspecified    FHx: congenital anomaly    39 weeks gestation of pregnancy    Maternal varicella, non-immune    UTI (urinary tract infection) during pregnancy     (normal spontaneous vaginal delivery)             Problem List    Transfer to Postpartum     Review the Delivery Report for details.     Delivery     Delivery: Vaginal, Spontaneous     YOB: 2022    Time of Birth:  Gestational Age 1:18 PM   39w6d     Anesthesia: Epidural     Delivering clinician: Yolanda Sanz    Forceps?   No   Vacuum? No    Shoulder dystocia present: No        Delivery narrative:  The patient is admitted for elective induction of labor.  She received cervadil then Pitocin was started per protocol.  Amniotomy was performed with clear fluid noted.  The patient entered an active phase of labor and progressed along a normal labor curve to complete dilatation at +2 station.    The fetal tracing remained predominantly category I with brief and limited category II changes.    With 10 minutes of pushing, patient delivered a live viable male infant occiput anterior with restitution to occiput right.  The anterior and posterior shoulder delivered without difficulty.  No nuchal cord was identified. The body was delivered atraumatically.  The infant's nose and oropharynx were suctioned and cleared of secretions.  Cord clamping was delayed a minimum of 30 seconds then was clamped and cut.  The infant was placed on the mom's chest for bonding and care.    The placenta was expressed and delivered intact.  Bilateral labial lacerations were repaired with 3-0 vicryl.  Excellent hemostasis was confirmed.        Infant      Findings: male  infant     Infant observations: Weight: No birth weight on file.   Length:   in  Observations/Comments:  scale 4      Apgars: 8  @ 1 minute /    9  @ 5 minutes   Infant Name: Sheridan     Placenta & Cord         Placenta delivered  Spontaneous  at   10/3/2023  1:20 PM     Cord: 3 vessels  present.   Nuchal Cord?  no   Cord blood obtained: Yes    Cord gases obtained:  No    Cord gas results: Venous:  No results found for: PHCVEN    Arterial:  No results found for: PHCART     Repair     Episiotomy: None     No    Lacerations: Yes  Laceration Information  Laceration Repaired?   Perineal: None      Periurethral:       Labial: bilateral  Yes    Sulcus:       Vaginal:       Cervical:         Suture used for repair: 3-0 Vicryl  Laceration Length: 3cm   Estimated Blood Loss:       Quantitative Blood Loss: Quantitative Blood Loss (mL): 192 mL (10/03/23 1330)        Complications     none    Disposition     Mother to Mother Baby/Postpartum  in stable condition currently.  Baby to remains with mom  in stable condition currently.    Yolanda Sanz MD  10/03/23  13:39 EDT

## 2023-10-03 NOTE — PLAN OF CARE
Problem: Adult Inpatient Plan of Care  Goal: Plan of Care Review  Outcome: Ongoing, Progressing  Flowsheets (Taken 10/3/2023 0614)  Progress: improving  Plan of Care Reviewed With: patient  Outcome Evaluation: term iol on cervidil placed at 2151 ended up being removed at 0340 due to uterine tachysytole. pt recieve a 250ml fluid bolus, 2 1mg of morphine, i rotated pt in multiple positions, emptied pt bladder, made pt get up and walk in the hallways before removing cervidil. pt planned on going naturally originally but cannot control pain so does plan on getting an epidural.  Goal: Patient-Specific Goal (Individualized)  Outcome: Ongoing, Progressing  Goal: Absence of Hospital-Acquired Illness or Injury  Outcome: Ongoing, Progressing  Intervention: Prevent and Manage VTE (Venous Thromboembolism) Risk  Recent Flowsheet Documentation  Taken 10/2/2023 2203 by Lisa Bruner, RN  Range of Motion: active ROM (range of motion) encouraged  Goal: Optimal Comfort and Wellbeing  Outcome: Ongoing, Progressing  Intervention: Provide Person-Centered Care  Recent Flowsheet Documentation  Taken 10/2/2023 2203 by Lsia Bruner RN  Trust Relationship/Rapport:   care explained   choices provided   emotional support provided   empathic listening provided   questions answered   questions encouraged   reassurance provided   thoughts/feelings acknowledged  Goal: Readiness for Transition of Care  Outcome: Ongoing, Progressing  Intervention: Mutually Develop Transition Plan  Recent Flowsheet Documentation  Taken 10/2/2023 2202 by Lisa Bruner, RN  Equipment Currently Used at Home: none     Problem: Bleeding (Labor)  Goal: Hemostasis  Outcome: Ongoing, Progressing     Problem: Change in Fetal Wellbeing (Labor)  Goal: Stable Fetal Wellbeing  Outcome: Ongoing, Progressing     Problem: Delayed Labor Progression (Labor)  Goal: Effective Progression to Delivery  Outcome: Ongoing, Progressing     Problem: Infection (Labor)  Goal: Absence  of Infection Signs and Symptoms  Outcome: Ongoing, Progressing     Problem: Labor Pain (Labor)  Goal: Acceptable Pain Control  Outcome: Ongoing, Progressing     Problem: Uterine Tachysystole (Labor)  Goal: Normal Uterine Contraction Pattern  Outcome: Ongoing, Progressing     Problem: Pain Acute  Goal: Acceptable Pain Control and Functional Ability  Outcome: Ongoing, Progressing   Goal Outcome Evaluation:  Plan of Care Reviewed With: patient        Progress: improving  Outcome Evaluation: term iol on cervidil placed at 2151 ended up being removed at 0340 due to uterine tachysytole. pt recieve a 250ml fluid bolus, 2 1mg of morphine, i rotated pt in multiple positions, emptied pt bladder, made pt get up and walk in the hallways before removing cervidil. pt planned on going naturally originally but cannot control pain so does plan on getting an epidural.

## 2023-10-03 NOTE — H&P
[facility]   Obstetrics and Gynecology   History & Physical    10/3/2023    Patient: Mayra Garner          MR#:[id number]    Chief complaint:  elective IOL    Subjective     23 y.o. female  at 39w6d presents for elective induction of labor.  Denies loss of fluid and vaginal bleeding.  Endorses regular fetal movements.    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  She also reports that her brother  soon after birth from a heart defect but she was placed in the foster system and does not know any more details.  She had consultation with MELISSA.  Anatomy ultrasound was normal and fetal echo was not recommended.  Fetal growth has been normal.      Patient Active Problem List   Diagnosis    Congenital malformation of peripheral vascular system, unspecified    FHx: congenital anomaly    39 weeks gestation of pregnancy    Maternal varicella, non-immune    Pregnancy headache in third trimester    UTI (urinary tract infection) during pregnancy       Past Medical History:   Diagnosis Date    Trauma     10 years old    Vascular abnormality     Right Arm        Past Surgical History:   Procedure Laterality Date    OTHER SURGICAL HISTORY Right     Arm sclerotherapy x5       Obstetric History:  OB History          2    Para        Term                AB   1    Living             SAB   1    IAB        Ectopic        Molar        Multiple        Live Births                   Menstrual History:     Patient's last menstrual period was 2022.       # 1 - Date: , Sex: None, Weight: None, GA: 4w0d, Delivery: None, Apgar1: None, Apgar5: None, Living: None, Birth Comments: None    # 2 - Date: None, Sex: None, Weight: None, GA: None, Delivery: None, Apgar1: None, Apgar5: None, Living: None, Birth Comments: None      Prenatal Information:  Prenatal Results       Initial Prenatal Labs       Test Value Reference Range Date Time    Hemoglobin   12.3 g/dL 12.0 - 15.9 04/25/23 1114       11.8 g/dL 12.0 - 15.9 04/11/23 2107       12.5 g/dL 11.1 - 15.9 03/02/23 1201    Hematocrit  36.3 % 34.0 - 46.6 04/25/23 1114       36.6 % 34.0 - 46.6 04/11/23 2107       36.8 % 34.0 - 46.6 03/02/23 1201    Platelets  193 10*3/mm3 140 - 450 04/25/23 1114       236 10*3/mm3 140 - 450 04/11/23 2107       259 x10E3/uL 150 - 450 03/02/23 1201    Rubella IgG  2.31 index Immune >0.99 03/02/23 1201    Hepatitis B SAg  Negative  Negative 03/02/23 1201    Hepatitis C Ab  Non Reactive  Non Reactive 03/02/23 1201    RPR  Non Reactive  Non Reactive 03/02/23 1201    T. Pallidum Ab         ABO  A   10/02/23 2135    Rh  Positive   10/02/23 2135    Antibody Screen  Negative  Negative 03/02/23 1201    HIV  Non Reactive  Non Reactive 03/02/23 1201    Urine Culture  No growth   09/15/23 1105       Final report   08/24/23 1218       >100,000 CFU/mL Staphylococcus, coagulase negative   08/09/23 1821       <25,000 CFU/mL Normal Urogenital Shannan   04/25/23 1056       Final report   03/02/23 1104    Gonorrhea  Negative  Negative 04/11/23 2314       Negative  Negative 03/02/23 1144    Chlamydia  Negative  Negative 04/11/23 2314       Negative  Negative 03/02/23 1144    TSH  1.160 uIU/mL 0.450 - 4.500 01/28/22 1108    HgB A1c         Varicella IgG  <135 index Immune >165 03/02/23 1201    HgB Electrophoresis         Cystic fibrosis                   Fetal testing        Test Value Reference Range Date Time    NIPT        MSAFP  *Screen Negative*   05/04/23 1205    AFP-4                  2nd and 3rd Trimester       Test Value Reference Range Date Time    Hemoglobin (repeated)  12.6 g/dL 12.0 - 15.9 10/02/23 2135       11.3 g/dL 12.0 - 15.9 07/06/23 1138    Hematocrit (repeated)  37.2 % 34.0 - 46.6 10/02/23 2135       34.5 % 34.0 - 46.6 07/06/23 1138    Platelets   218 10*3/mm3 140 - 450 10/02/23 2135       217 10*3/mm3 140 - 450 07/06/23 1138       193 10*3/mm3 140 - 450 04/25/23 1114       236  10*3/mm3 140 - 450 04/11/23 2107       259 x10E3/uL 150 - 450 03/02/23 1201    GCT  86 mg/dL 65 - 139 07/06/23 1138    Antibody Screen (repeated)  Negative   10/02/23 2135       Negative  Negative 03/02/23 1201    GTT Fasting        GTT 1 Hr        GTT 2 Hr        GTT 3 Hr        Group B Strep  Negative  Negative 09/07/23 1051              Other testing        Test Value Reference Range Date Time    Parvo IgG         CMV IgG                   Drug Screening       Test Value Reference Range Date Time    Amphetamine Screen        Barbiturate Screen        Benzodiazepine Screen        Methadone Screen        Phencyclidine Screen        Opiates Screen        THC Screen        Cocaine Screen        Propoxyphene Screen        Buprenorphine Screen        Methamphetamine Screen        Oxycodone Screen        Tricyclic Antidepressants Screen                  Legend    ^: Historical                          External Prenatal Results       Pregnancy Outside Results - Transcribed From Office Records - See Scanned Records For Details       Test Value Date Time    ABO  A  10/02/23 2135    Rh  Positive  10/02/23 2135    Antibody Screen  Negative  10/02/23 2135       Negative  03/02/23 1201    Varicella IgG  <135 index 03/02/23 1201    Rubella  2.31 index 03/02/23 1201    Hgb  12.6 g/dL 10/02/23 2135       11.3 g/dL 07/06/23 1138       12.3 g/dL 04/25/23 1114       11.8 g/dL 04/11/23 2107       12.5 g/dL 03/02/23 1201    Hct  37.2 % 10/02/23 2135       34.5 % 07/06/23 1138       36.3 % 04/25/23 1114       36.6 % 04/11/23 2107       36.8 % 03/02/23 1201    Glucose Fasting GTT       Glucose Tolerance Test 1 hour       Glucose Tolerance Test 3 hour       Gonorrhea (discrete)  Negative  04/11/23 2314       Negative  03/02/23 1144    Chlamydia (discrete)  Negative  04/11/23 2314       Negative  03/02/23 1144    RPR  Non Reactive  03/02/23 1201    VDRL       Syphilis Antibody       HBsAg  Negative  03/02/23 1201    Herpes Simplex Virus  PCR       Herpes Simplex VIrus Culture       HIV  Non Reactive  03/02/23 1201    Hep C RNA Quant PCR       Hep C Antibody  Non Reactive  03/02/23 1201    AFP  37.3 ng/mL 05/04/23 1205    Group B Strep  Negative  09/07/23 1051    GBS Susceptibility to Clindamycin       GBS Susceptibility to Erythromycin       Fetal Fibronectin       Genetic Testing, Maternal Blood                 Drug Screening       Test Value Date Time    Urine Drug Screen       Amphetamine Screen       Barbiturate Screen       Benzodiazepine Screen       Methadone Screen       Phencyclidine Screen       Opiates Screen       THC Screen       Cocaine Screen       Propoxyphene Screen       Buprenorphine Screen       Methamphetamine Screen       Oxycodone Screen       Tricyclic Antidepressants Screen                 Legend    ^: Historical                              Family History   Adopted: Yes   Problem Relation Age of Onset    Breast cancer Paternal Grandmother        Social History     Tobacco Use    Smoking status: Never     Passive exposure: Never    Smokeless tobacco: Never   Vaping Use    Vaping Use: Never used   Substance Use Topics    Alcohol use: Not Currently    Drug use: Not Currently     Comment: in highschool a couple times       Iodine and Shellfish-derived products      Current Facility-Administered Medications:     acetaminophen (TYLENOL) tablet 650 mg, 650 mg, Oral, Q4H PRN, Yolanda Sanz MD    diphenhydrAMINE (BENADRYL) capsule 25 mg, 25 mg, Oral, Nightly PRN **OR** diphenhydrAMINE (BENADRYL) injection 25 mg, 25 mg, Intravenous, Nightly PRN, Yolanda Sanz MD    diphenhydrAMINE (BENADRYL) injection 12.5 mg, 12.5 mg, Intravenous, Q8H PRN, Lowell Ornelas MD    ePHEDrine injection 5 mg, 5 mg, Intravenous, Q15 Min PRN, Lowell Ornelas MD    famotidine (PEPCID) injection 20 mg, 20 mg, Intravenous, Once PRN, Lowell Ornelas MD    fentaNYL 2mcg/mL and ropivacaine 0.2% in NS epidural 100mL, 10 mL/hr,  Epidural, Continuous, Lowell Ornelas MD, Last Rate: 10 mL/hr at 10/03/23 0721, 10 mL/hr at 10/03/23 0721    lactated ringers bolus 1,000 mL, 1,000 mL, Intravenous, Once, Yolanda Sanz MD, Stopped at 10/03/23 0743    lactated ringers infusion, 125 mL/hr, Intravenous, Continuous, Yolanda Sanz MD, Last Rate: 999 mL/hr at 10/03/23 0936, 999 mL/hr at 10/03/23 0936    lidocaine (XYLOCAINE) 1 % injection 0.5 mL, 0.5 mL, Intradermal, Once PRN, Yolanda Sanz MD    morphine injection 1 mg, 1 mg, Intravenous, Q1H PRN, 1 mg at 10/03/23 0513 **AND** naloxone (NARCAN) injection 0.4 mg, 0.4 mg, Intravenous, Q5 Min PRN, Hong Catalan MD    ondansetron (ZOFRAN) tablet 4 mg, 4 mg, Oral, Q6H PRN **OR** ondansetron (ZOFRAN) injection 4 mg, 4 mg, Intravenous, Q6H PRN, Yolanda Sanz MD    ondansetron (ZOFRAN) injection 4 mg, 4 mg, Intravenous, Once PRN, Lowell Ornelas MD    oxytocin (PITOCIN) 30 units in 0.9% sodium chloride 500 mL (premix), 2-20 brinda-units/min, Intravenous, Titrated, Yolanda Sanz MD    sodium chloride 0.9 % flush 10 mL, 10 mL, Intravenous, Q12H, Yolanda Sanz MD    sodium chloride 0.9 % flush 10 mL, 10 mL, Intravenous, PRN, Yolanda Sanz MD    sodium chloride 0.9 % infusion 40 mL, 40 mL, Intravenous, PRN, Yolanda Sanz MD    terbutaline (BRETHINE) injection 0.25 mg, 0.25 mg, Subcutaneous, PRN, Yolanda Sanz MD    Facility-Administered Medications Ordered in Other Encounters:     lidocaine-EPINEPHrine (XYLOCAINE W/EPI) 1 %-1:126494 injection, , Infiltration, PRN, Hong Abdi MD, 3 mL at 10/03/23 0718    ropivacaine (NAROPIN) 0.2 % injection, , Epidural, PRN, Hong Abdi MD, 8 mL at 10/03/23 0721    Review of Systems  Review of Systems   All other systems reviewed and are negative.    Objective     Vital Signs  Temp:  [98 °F (36.7 °C)-99 °F (37.2 °C)] 98 °F (36.7 °C)  Heart Rate:  [74-96] 87  Resp:  [16-18] 17  BP:  (102-141)/() 102/64    Physical Exam:  Physical Exam  Vitals and nursing note reviewed.   Constitutional:       General: She is not in acute distress.     Appearance: Normal appearance.   HENT:      Head: Normocephalic and atraumatic.   Eyes:      Extraocular Movements: Extraocular movements intact.   Cardiovascular:      Rate and Rhythm: Normal rate.   Pulmonary:      Effort: Pulmonary effort is normal. No respiratory distress.   Abdominal:      General: There is no distension.      Palpations: Abdomen is soft. There is no mass.      Tenderness: There is no abdominal tenderness.   Genitourinary:     Comments: Cervix 4/80/-1, med, mid  Musculoskeletal:         General: Normal range of motion.      Cervical back: Normal range of motion.   Skin:     General: Skin is warm and dry.   Neurological:      General: No focal deficit present.      Mental Status: She is alert and oriented to person, place, and time.   Psychiatric:         Mood and Affect: Mood normal.         Behavior: Behavior normal.         Hospital problem list:    Congenital malformation of peripheral vascular system, unspecified    FHx: congenital anomaly    39 weeks gestation of pregnancy    Maternal varicella, non-immune    UTI (urinary tract infection) during pregnancy      Assessment & Plan     1. Intrauterine pregnancy at 39w6d presents for elective IOL  -Admit for IOL  -GBS neg  -Reviewed procedure with patient.  I discussed the risks including but not limited to bleeding, infection and damage to internal organs.  Understanding of the procedure is voiced.   -AROM performed with clear fluid noted, IUPC placed  -Pitocin ordered    2. Vascular lesion on arm - If pain develops, consider lovenox ppx.  Otherwise, no interventions needed.    Dispo: admit for IOL    Yolanda Sanz MD  10/03/23  09:48 EDT      Patient Care Team:  Neva Ortiz APRN as PCP - General (Family Medicine)  Yolanda Sanz MD as Gynecologist  (Gynecology)

## 2023-10-03 NOTE — ANESTHESIA POSTPROCEDURE EVALUATION
"Patient: Mayra Garner    Procedure Summary       Date: 10/03/23 Room / Location:     Anesthesia Start: 0706 Anesthesia Stop: 1318    Procedure: LABOR ANALGESIA Diagnosis:     Scheduled Providers:  Provider: Hong Abdi MD    Anesthesia Type: epidural ASA Status: 2            Anesthesia Type: epidural    Vitals  Vitals Value Taken Time   /67 10/03/23 1501   Temp 36.8 °C (98.2 °F) 10/03/23 1330   Pulse 93 10/03/23 1501   Resp 17 10/03/23 1330   SpO2 100 % 10/03/23 1310   Vitals shown include unvalidated device data.        Post Anesthesia Care and Evaluation    Anesthetic complications: No anesthetic complications    Comments: /67   Pulse 93   Temp 36.8 °C (98.2 °F) (Oral)   Resp 17   Ht 165.1 cm (65\")   Wt 85 kg (187 lb 6.3 oz)   LMP 12/30/2022   SpO2 100%   Breastfeeding Yes   BMI 31.18 kg/m²     No anesthesia complications reported to me.    "

## 2023-10-03 NOTE — ANESTHESIA PROCEDURE NOTES
Labor Epidural      Patient reassessed immediately prior to procedure    Patient location during procedure: OB  Start Time: 10/3/2023 7:06 AM  Performed By  Anesthesiologist: Hong Abdi MD  Preanesthetic Checklist  Completed: patient identified, risks and benefits discussed and timeout performed  Prep:  Pt Position:sitting  Sterile Tech:cap, gloves, mask and sterile barrier  Prep:chlorhexidine gluconate and isopropyl alcohol  Monitoring:blood pressure monitoring, continuous pulse oximetry and EKG  Epidural Block Procedure:  Approach:midline  Guidance:landmark technique and palpation technique  Location:L3-L4  Needle Type:Tuohy  Needle Gauge:17 G  Loss of Resistance Medium: saline  Loss of Resistance: 5cm  Cath Depth at skin:12 cm  Paresthesia: none  Aspiration:negative  Test Dose:negative  Number of Attempts: 1  Post Assessment:  Dressing:occlusive dressing applied and secured with tape  Pt Tolerance:patient tolerated the procedure well with no apparent complications  Complications:no

## 2023-10-03 NOTE — PLAN OF CARE
Goal Outcome Evaluation:           Progress: improving   Pt doing well. VSS; slightly elevated BP. Pain well controllled with Motrin. Fundus and lochia WNL Up x 1 with assist x2. Voiding spontaneously. Breastfeeding well. No concerns at this time.

## 2023-10-03 NOTE — NURSING NOTE
Pt, baby, and visitor transferred to room 310 alert and oriented with all belongings. Call light within reach; MAYA Clark at bedside. Relinquishing care at this time.

## 2023-10-03 NOTE — PLAN OF CARE
Problem: Adult Inpatient Plan of Care  Goal: Plan of Care Review  Outcome: Ongoing, Progressing  Flowsheets (Taken 10/3/2023 1402)  Progress: improving  Plan of Care Reviewed With: patient  Outcome Evaluation: Patient in LDR13 is alert and oriented with VS WNL. Patient is recovering from an uncomplicated  of a viable male infant. Fundus firm and midline with scant bleeding noted. Patient reports no pain at this time. Infant skin to skin with patient. Plan of care discussed and patient verbalized understanding. Plan for transfer to mother/baby after 2 hour recovery.  Goal: Patient-Specific Goal (Individualized)  Outcome: Ongoing, Progressing  Goal: Absence of Hospital-Acquired Illness or Injury  Outcome: Ongoing, Progressing  Intervention: Identify and Manage Fall Risk  Recent Flowsheet Documentation  Taken 10/3/2023 1345 by Maureen Medrano RN  Safety Promotion/Fall Prevention: safety round/check completed  Taken 10/3/2023 0900 by Maureen Medrano RN  Safety Promotion/Fall Prevention: safety round/check completed  Taken 10/3/2023 0731 by Maureen Medrano RN  Safety Promotion/Fall Prevention: safety round/check completed  Goal: Optimal Comfort and Wellbeing  Outcome: Ongoing, Progressing  Intervention: Monitor Pain and Promote Comfort  Recent Flowsheet Documentation  Taken 10/3/2023 0900 by Maureen Medrano RN  Pain Management Interventions: see MAR  Taken 10/3/2023 07 by Maureen Medrano RN  Pain Management Interventions: see MAR  Intervention: Provide Person-Centered Care  Recent Flowsheet Documentation  Taken 10/3/2023 07 by Maureen Medrano RN  Trust Relationship/Rapport:   care explained   choices provided   emotional support provided   empathic listening provided   questions answered   questions encouraged   reassurance provided   thoughts/feelings acknowledged  Goal: Readiness for Transition of Care  Outcome: Ongoing, Progressing  Intervention: Mutually Develop Transition  Plan  Recent Flowsheet Documentation  Taken 10/3/2023 1021 by Maureen Medrano RN  Transportation Anticipated: car, drives self  Transportation Concerns: no car  Patient/Family Anticipated Services at Transition: none  Patient/Family Anticipates Transition to: home     Problem: Pain Acute  Goal: Acceptable Pain Control and Functional Ability  Outcome: Ongoing, Progressing  Intervention: Develop Pain Management Plan  Recent Flowsheet Documentation  Taken 10/3/2023 0900 by Maureen Medrano RN  Pain Management Interventions: see MAR  Taken 10/3/2023 07 by Maureen Medrano RN  Pain Management Interventions: see MAR  Intervention: Optimize Psychosocial Wellbeing  Recent Flowsheet Documentation  Taken 10/3/2023 07 by Maureen Medrano RN  Diversional Activities:   smartphone   television     Problem:  Fall Injury Risk  Goal: Absence of Fall, Infant Drop and Related Injury  Outcome: Ongoing, Progressing  Intervention: Promote Injury-Free Environment  Recent Flowsheet Documentation  Taken 10/3/2023 1345 by Maureen Medrano RN  Safety Promotion/Fall Prevention: safety round/check completed  Taken 10/3/2023 0900 by Maureen Medrano RN  Safety Promotion/Fall Prevention: safety round/check completed  Taken 10/3/2023 0731 by Maureen Medrano RN  Safety Promotion/Fall Prevention: safety round/check completed     Problem: Skin Injury Risk Increased  Goal: Skin Health and Integrity  Outcome: Ongoing, Progressing   Goal Outcome Evaluation:  Plan of Care Reviewed With: patient        Progress: improving  Outcome Evaluation: Patient in LDR13 is alert and oriented with VS WNL. Patient is recovering from an uncomplicated  of a viable male infant. Fundus firm and midline with scant bleeding noted. Patient reports no pain at this time. Infant skin to skin with patient. Plan of care discussed and patient verbalized understanding. Plan for transfer to mother/baby after 2 hour recovery.

## 2023-10-03 NOTE — LACTATION NOTE
This note was copied from a baby's chart.  Latch check per RN request.  Infant is positioned tummy to tummy with mom, lower arm is not in between mom & baby.  Deep jaw rotations noted with sucking bursts, mom reports strong tugging & denies pain w/latch.  Bottom lip is flanged out when chin tugged down.  Mom given lanolin w/instructions.    Lactation Consult Note    Evaluation Completed: 10/3/2023 17:40 EDT  Patient Name: Noble Garner  :  10/3/2022  MRN:  [id number]     REFERRAL  INFORMATION:                                         DELIVERY HISTORY:  This patient has no babies on file.  This patient has no babies on file.  Skin to skin initiation date/time: 10/3/2023 1:20 PM  Skin to skin end date/time: 10/3/2023 2:50 PM  This patient has no babies on file.    MATERNAL ASSESSMENT:           Areola: Right:, elastic (10/03/23 1738)  Nipples: Bilateral:, everted, graspable (10/03/23 1738)                MATERNAL INFANT FEEDING:     Maternal Emotional State: receptive (10/03/23 1738)  Infant Positioning: cradle (10/03/23 1738)   Signs of Milk Transfer: deep jaw excursions noted (10/03/23 1738)  Pain with Feeding: no (10/03/23 1738)                       Latch Assistance: minimal assistance (10/03/23 1738)

## 2023-10-04 LAB
BASOPHILS # BLD AUTO: 0.02 10*3/MM3 (ref 0–0.2)
BASOPHILS NFR BLD AUTO: 0.2 % (ref 0–1.5)
DEPRECATED RDW RBC AUTO: 40.3 FL (ref 37–54)
EOSINOPHIL # BLD AUTO: 0.07 10*3/MM3 (ref 0–0.4)
EOSINOPHIL NFR BLD AUTO: 0.7 % (ref 0.3–6.2)
ERYTHROCYTE [DISTWIDTH] IN BLOOD BY AUTOMATED COUNT: 12.2 % (ref 12.3–15.4)
HCT VFR BLD AUTO: 34 % (ref 34–46.6)
HGB BLD-MCNC: 11.6 G/DL (ref 12–15.9)
IMM GRANULOCYTES # BLD AUTO: 0.05 10*3/MM3 (ref 0–0.05)
IMM GRANULOCYTES NFR BLD AUTO: 0.5 % (ref 0–0.5)
LYMPHOCYTES # BLD AUTO: 2.03 10*3/MM3 (ref 0.7–3.1)
LYMPHOCYTES NFR BLD AUTO: 19 % (ref 19.6–45.3)
MCH RBC QN AUTO: 31.2 PG (ref 26.6–33)
MCHC RBC AUTO-ENTMCNC: 34.1 G/DL (ref 31.5–35.7)
MCV RBC AUTO: 91.4 FL (ref 79–97)
MONOCYTES # BLD AUTO: 0.53 10*3/MM3 (ref 0.1–0.9)
MONOCYTES NFR BLD AUTO: 5 % (ref 5–12)
NEUTROPHILS NFR BLD AUTO: 7.98 10*3/MM3 (ref 1.7–7)
NEUTROPHILS NFR BLD AUTO: 74.6 % (ref 42.7–76)
NRBC BLD AUTO-RTO: 0 /100 WBC (ref 0–0.2)
PLATELET # BLD AUTO: 191 10*3/MM3 (ref 140–450)
PMV BLD AUTO: 9.7 FL (ref 6–12)
RBC # BLD AUTO: 3.72 10*6/MM3 (ref 3.77–5.28)
WBC NRBC COR # BLD: 10.68 10*3/MM3 (ref 3.4–10.8)

## 2023-10-04 PROCEDURE — 85025 COMPLETE CBC W/AUTO DIFF WBC: CPT | Performed by: STUDENT IN AN ORGANIZED HEALTH CARE EDUCATION/TRAINING PROGRAM

## 2023-10-04 PROCEDURE — 0503F POSTPARTUM CARE VISIT: CPT | Performed by: OBSTETRICS & GYNECOLOGY

## 2023-10-04 RX ADMIN — MAGNESIUM HYDROXIDE 15 ML: 400 SUSPENSION ORAL at 14:45

## 2023-10-04 RX ADMIN — IBUPROFEN 600 MG: 600 TABLET, FILM COATED ORAL at 05:56

## 2023-10-04 RX ADMIN — DOCUSATE SODIUM 100 MG: 100 CAPSULE, LIQUID FILLED ORAL at 20:25

## 2023-10-04 RX ADMIN — DOCUSATE SODIUM 100 MG: 100 CAPSULE, LIQUID FILLED ORAL at 08:28

## 2023-10-04 RX ADMIN — IBUPROFEN 600 MG: 600 TABLET, FILM COATED ORAL at 14:41

## 2023-10-04 NOTE — PLAN OF CARE
Goal Outcome Evaluation:      VSS. Pt up and voiding independently. Breast feeding. Pain controlled. Bonding well with infant. No concerns at this time.

## 2023-10-04 NOTE — PLAN OF CARE
Goal Outcome Evaluation:           Progress: improving   Pt doing well. VSS. Pain well controllled with Motrin. Fundus and lochia. Complaints of constipation this shift, Colace and milk of mag given. Ambulating independently, voiding spontaneously. Breastfeeding well. No concerns at this time.

## 2023-10-04 NOTE — LACTATION NOTE
Pt reports baby has been BF well. LC assisted pt with latching baby to left breast in football position. Baby is latching and BF well. Encouraged to BF on demand, at least every 2-3 hours for 10-15 min on each breast. Reviewed provided booklet on BF with parents. Pt has 2 personal pumps. Encouraged to call LC as needed.    Lactation Consult Note    Evaluation Completed: 10/4/2023 09:46 EDT  Patient Name: Mayra Garner  :  2000  MRN:  [id number]     REFERRAL  INFORMATION:                                         DELIVERY HISTORY:        Skin to skin initiation date/time: 10/3/2023  1:20 PM   Skin to skin end date/time: 10/3/2023  2:50 PM        MATERNAL ASSESSMENT:                               INFANT ASSESSMENT:  Information for the patient's :  Noble Garner [[id number]]   No past medical history on file.                                                                                                   MATERNAL INFANT FEEDING:                                                                       EQUIPMENT TYPE:                                 BREAST PUMPING:          LACTATION REFERRALS:

## 2023-10-04 NOTE — PROGRESS NOTES
"[facility]  Vaginal Delivery Progress Note    Subjective   Postpartum Day 1: Vaginal Delivery    The patient feels well.  Her pain is well controlled.  She is ambulating well.  Patient describes her bleeding as staining only.    Breastfeeding: infant latching.    ROS:  Neuro: neg for headache  Pulm: neg for soa  CV: neg for chest pain  Musculoskeletal: neg for leg pain  : neg for heavy bleeding    Objective     Vital Signs Range for the last 24 hours  Temperature: Temp:  [97.9 °F (36.6 °C)-99.1 °F (37.3 °C)] 97.9 °F (36.6 °C)   Temp Source: Temp src: Oral   BP: BP: ()/(51-91) 102/74   Pulse: Heart Rate:  [68-95] 91   Respirations: Resp:  [16-17] 16   SPO2: SpO2:  [100 %] 100 %   O2 Amount (l/min):     O2 Devices     Weight:       Admit Height:  Height: 165.1 cm (65\")      Physical Exam:  General:  no acute distress.  Abdomen: Fundus: appropriate, firm, non tender  Extremities: Bilateral lower ext with trace edema, no cords or tenderness.       Lab results reviewed:  Lab Results   Component Value Date    WBC 10.68 10/04/2023    HGB 11.6 (L) 10/04/2023    HCT 34.0 10/04/2023    MCV 91.4 10/04/2023     10/04/2023     Rubella:     Rubella Antibodies, IgG   Date Value Ref Range Status   03/02/2023 2.31 Immune >0.99 index Final     Comment:                                     Non-immune       <0.90                                  Equivocal  0.90 - 0.99                                  Immune           >0.99       Rh Status:    RH type   Date Value Ref Range Status   10/02/2023 Positive  Final     Rh Factor   Date Value Ref Range Status   03/02/2023 Positive  Final     Comment:     Please note: Prior records for this patient's ABO / Rh type are not  available for additional verification.       Immunizations:   Immunization History   Administered Date(s) Administered    COVID-19 (PFIZER) Purple Cap Monovalent 09/18/2021, 10/09/2021    Covid-19 (Pfizer) Gray Cap Monovalent 02/25/2022    Fluzone (or Fluarix " & Flulaval for VFC) >6mos 2015, 2016    HPV Quadrivalent 2015    Hep A, 2 Dose 2016, 2017    Hep B, Adolescent or Pediatric 2013, 2013    MCV4 Unspecified 2012, 2017    Meningococcal B,(Bexsero) 2018, 2018    Meningococcal MCV4P (Menactra) 2012, 2017    Tdap 2012, 2023    Varicella 2012       Assessment & Plan       Congenital malformation of peripheral vascular system, unspecified    FHx: congenital anomaly    39 weeks gestation of pregnancy    Maternal varicella, non-immune    UTI (urinary tract infection) during pregnancy     (normal spontaneous vaginal delivery)      Mayra Garner is Day 1  post-partum    Vaginal, Spontaneous       Pt is doing well and denies any issues today.    Pt requests infant circumcision. Risks reviewed to include bleeding, infection, damage to penis and need for revision. Also reviewed elective nature of procedure. Pt desires today.    Plan:  Continue current care.      Lizeth Her MD  10/4/2023  09:52 EDT

## 2023-10-04 NOTE — LACTATION NOTE
This note was copied from a baby's chart.  Mom reports baby has been nursing x 1hr and is still eating his hands when coming off the breast. Hand pump given and Mom requests formula supplement. Dad gave baby 15 mls of formula. Encouraged pumping 15 minutes each breast,  give all EBM after next breast feeding session and call for further assistance. Discussed milk supply and encouraged pumping every 3hrs if baby is acting hungry or not latching well.

## 2023-10-05 VITALS
SYSTOLIC BLOOD PRESSURE: 126 MMHG | BODY MASS INDEX: 31.22 KG/M2 | HEART RATE: 98 BPM | OXYGEN SATURATION: 100 % | RESPIRATION RATE: 16 BRPM | DIASTOLIC BLOOD PRESSURE: 86 MMHG | TEMPERATURE: 97.5 F | WEIGHT: 187.39 LBS | HEIGHT: 65 IN

## 2023-10-05 LAB
BACTERIA UR QL AUTO: ABNORMAL /HPF
BASOPHILS # BLD AUTO: 0.02 10*3/MM3 (ref 0–0.2)
BASOPHILS NFR BLD AUTO: 0.2 % (ref 0–1.5)
BILIRUB UR QL STRIP: NEGATIVE
CLARITY UR: ABNORMAL
COLOR UR: YELLOW
DEPRECATED RDW RBC AUTO: 41.8 FL (ref 37–54)
EOSINOPHIL # BLD AUTO: 0.18 10*3/MM3 (ref 0–0.4)
EOSINOPHIL NFR BLD AUTO: 1.4 % (ref 0.3–6.2)
ERYTHROCYTE [DISTWIDTH] IN BLOOD BY AUTOMATED COUNT: 12.4 % (ref 12.3–15.4)
GLUCOSE UR STRIP-MCNC: NEGATIVE MG/DL
HCT VFR BLD AUTO: 35.1 % (ref 34–46.6)
HGB BLD-MCNC: 11.7 G/DL (ref 12–15.9)
HGB UR QL STRIP.AUTO: ABNORMAL
HYALINE CASTS UR QL AUTO: ABNORMAL /LPF
IMM GRANULOCYTES # BLD AUTO: 0.06 10*3/MM3 (ref 0–0.05)
IMM GRANULOCYTES NFR BLD AUTO: 0.5 % (ref 0–0.5)
KETONES UR QL STRIP: NEGATIVE
LEUKOCYTE ESTERASE UR QL STRIP.AUTO: ABNORMAL
LYMPHOCYTES # BLD AUTO: 2.17 10*3/MM3 (ref 0.7–3.1)
LYMPHOCYTES NFR BLD AUTO: 17 % (ref 19.6–45.3)
MCH RBC QN AUTO: 30.7 PG (ref 26.6–33)
MCHC RBC AUTO-ENTMCNC: 33.3 G/DL (ref 31.5–35.7)
MCV RBC AUTO: 92.1 FL (ref 79–97)
MONOCYTES # BLD AUTO: 0.58 10*3/MM3 (ref 0.1–0.9)
MONOCYTES NFR BLD AUTO: 4.6 % (ref 5–12)
NEUTROPHILS NFR BLD AUTO: 76.3 % (ref 42.7–76)
NEUTROPHILS NFR BLD AUTO: 9.72 10*3/MM3 (ref 1.7–7)
NITRITE UR QL STRIP: NEGATIVE
NRBC BLD AUTO-RTO: 0 /100 WBC (ref 0–0.2)
PH UR STRIP.AUTO: 6 [PH] (ref 5–8)
PLATELET # BLD AUTO: 183 10*3/MM3 (ref 140–450)
PMV BLD AUTO: 9.1 FL (ref 6–12)
PROT UR QL STRIP: ABNORMAL
RBC # BLD AUTO: 3.81 10*6/MM3 (ref 3.77–5.28)
RBC # UR STRIP: ABNORMAL /HPF
REF LAB TEST METHOD: ABNORMAL
SP GR UR STRIP: 1.02 (ref 1–1.03)
SQUAMOUS #/AREA URNS HPF: ABNORMAL /HPF
UROBILINOGEN UR QL STRIP: ABNORMAL
WBC # UR STRIP: ABNORMAL /HPF
WBC NRBC COR # BLD: 12.73 10*3/MM3 (ref 3.4–10.8)

## 2023-10-05 PROCEDURE — 0503F POSTPARTUM CARE VISIT: CPT | Performed by: OBSTETRICS & GYNECOLOGY

## 2023-10-05 PROCEDURE — 81001 URINALYSIS AUTO W/SCOPE: CPT | Performed by: OBSTETRICS & GYNECOLOGY

## 2023-10-05 PROCEDURE — 85025 COMPLETE CBC W/AUTO DIFF WBC: CPT | Performed by: OBSTETRICS & GYNECOLOGY

## 2023-10-05 RX ORDER — CEPHALEXIN 500 MG/1
500 CAPSULE ORAL ONCE
Status: COMPLETED | OUTPATIENT
Start: 2023-10-05 | End: 2023-10-05

## 2023-10-05 RX ORDER — IBUPROFEN 600 MG/1
600 TABLET ORAL EVERY 6 HOURS PRN
Qty: 18 TABLET | Refills: 0 | Status: SHIPPED | OUTPATIENT
Start: 2023-10-05

## 2023-10-05 RX ORDER — HYDROCODONE BITARTRATE AND ACETAMINOPHEN 5; 325 MG/1; MG/1
1 TABLET ORAL EVERY 6 HOURS PRN
Qty: 8 TABLET | Refills: 0 | Status: SHIPPED | OUTPATIENT
Start: 2023-10-05 | End: 2023-10-10

## 2023-10-05 RX ORDER — CEPHALEXIN 500 MG/1
500 CAPSULE ORAL 3 TIMES DAILY
Qty: 15 CAPSULE | Refills: 0 | Status: SHIPPED | OUTPATIENT
Start: 2023-10-05

## 2023-10-05 RX ADMIN — IBUPROFEN 600 MG: 600 TABLET, FILM COATED ORAL at 08:09

## 2023-10-05 RX ADMIN — DOCUSATE SODIUM 100 MG: 100 CAPSULE, LIQUID FILLED ORAL at 08:09

## 2023-10-05 RX ADMIN — IBUPROFEN 600 MG: 600 TABLET, FILM COATED ORAL at 00:04

## 2023-10-05 RX ADMIN — CEPHALEXIN 500 MG: 500 CAPSULE ORAL at 11:32

## 2023-10-05 RX ADMIN — IBUPROFEN 600 MG: 600 TABLET, FILM COATED ORAL at 14:28

## 2023-10-05 NOTE — LACTATION NOTE
Pt reports baby has been BF well. Baby did have one formula bottle in nsy last night because pt was tired but she plans to cont BF. Educated on milk coming, baby's expected output and weight gain. Pt has [facility] info for f/u    Lactation Consult Note    Evaluation Completed: 10/5/2023 09:50 EDT  Patient Name: Mayra Garner  :  2000  MRN:  [id number]     REFERRAL  INFORMATION:                                         DELIVERY HISTORY:        Skin to skin initiation date/time: 10/3/2023  1:20 PM   Skin to skin end date/time: 10/3/2023  2:50 PM        MATERNAL ASSESSMENT:                               INFANT ASSESSMENT:  Information for the patient's :  Noble Garner [[id number]]   No past medical history on file.                                                                                                   MATERNAL INFANT FEEDING:                                                                       EQUIPMENT TYPE:                                 BREAST PUMPING:          LACTATION REFERRALS:

## 2023-10-05 NOTE — PLAN OF CARE
Goal Outcome Evaluation:           Progress: improving  Pt doing well. VSS. Pain well controllled with Motrin. C/O cloudy and odorous urine this AM. UA indicates UTI. 1st dose of Keflex 500 mg will be given before d/c and then pt will  abx from pharmacy on way home. Fundus and lochia WNL. Ambulating independently, voiding spontaneously. Breastfeeding well and also supplementing with formula. No concerns at this time.

## 2023-10-05 NOTE — DISCHARGE SUMMARY
PPD 2 Discharge Summary/Progress      This  female, was admitted on 10/2/2023 and underwent a Vaginal, Spontaneous  on 10/3/2023 , resulting in the birth of the following:        Infant    Findings:    Infant observations:                            APGARS  One minute Five minutes Ten minutes Fifteen minutes Twenty minutes   Skin color: 0   1             Heart rate: 2   2             Grimace: 2   2              Muscle tone: 2   2              Breathin   2              Totals: 8   9                  LABS:   Lab Results (last 72 hours)       Procedure Component Value Units Date/Time    CBC & Differential [[id number]]  (Abnormal) Collected: 10/04/23 0703    Specimen: Blood Updated: 10/04/23 0812    Narrative:      The following orders were created for panel order CBC & Differential.  Procedure                               Abnormality         Status                     ---------                               -----------         ------                     CBC Auto Differential[[id number]]        Abnormal            Final result                 Please view results for these tests on the individual orders.    CBC Auto Differential [[id number]]  (Abnormal) Collected: 10/04/23 0703    Specimen: Blood Updated: 10/04/23 0812     WBC 10.68 10*3/mm3      RBC 3.72 10*6/mm3      Hemoglobin 11.6 g/dL      Hematocrit 34.0 %      MCV 91.4 fL      MCH 31.2 pg      MCHC 34.1 g/dL      RDW 12.2 %      RDW-SD 40.3 fl      MPV 9.7 fL      Platelets 191 10*3/mm3      Neutrophil % 74.6 %      Lymphocyte % 19.0 %      Monocyte % 5.0 %      Eosinophil % 0.7 %      Basophil % 0.2 %      Immature Grans % 0.5 %      Neutrophils, Absolute 7.98 10*3/mm3      Lymphocytes, Absolute 2.03 10*3/mm3      Monocytes, Absolute 0.53 10*3/mm3      Eosinophils, Absolute 0.07 10*3/mm3      Basophils, Absolute 0.02 10*3/mm3      Immature Grans, Absolute 0.05 10*3/mm3      nRBC 0.0 /100 WBC     CBC & Differential [[id number]]  (Normal)  Collected: 10/02/23 2135    Specimen: Blood Updated: 10/02/23 2150    Narrative:      The following orders were created for panel order CBC & Differential.  Procedure                               Abnormality         Status                     ---------                               -----------         ------                     CBC Auto Differential[[id number]]        Normal              Final result                 Please view results for these tests on the individual orders.    CBC Auto Differential [[id number]]  (Normal) Collected: 10/02/23 2135    Specimen: Blood Updated: 10/02/23 2150     WBC 7.87 10*3/mm3      RBC 4.07 10*6/mm3      Hemoglobin 12.6 g/dL      Hematocrit 37.2 %      MCV 91.4 fL      MCH 31.0 pg      MCHC 33.9 g/dL      RDW 12.6 %      RDW-SD 41.9 fl      MPV 9.8 fL      Platelets 218 10*3/mm3      Neutrophil % 60.9 %      Lymphocyte % 30.4 %      Monocyte % 7.4 %      Eosinophil % 0.6 %      Basophil % 0.3 %      Immature Grans % 0.4 %      Neutrophils, Absolute 4.80 10*3/mm3      Lymphocytes, Absolute 2.39 10*3/mm3      Monocytes, Absolute 0.58 10*3/mm3      Eosinophils, Absolute 0.05 10*3/mm3      Basophils, Absolute 0.02 10*3/mm3      Immature Grans, Absolute 0.03 10*3/mm3      nRBC 0.0 /100 WBC               ROS:  Pulm: neg for soa  GI: neg for heavy bleeding  Musculoskel: neg for leg pain       Discharge Medications        ASK your doctor about these medications        Instructions Start Date   prenatal (CLASSIC) vitamin  tablet  Generic drug: prenatal vitamin   1 tablet, Oral, Daily                  OB History    Para Term  AB Living   2 1 1   1 1   SAB IAB Ectopic Molar Multiple Live Births   1       0 1      # Outcome Date GA Lbr Guido/2nd Weight Sex Delivery Anes PTL Lv   2 Term 10/03/23 39w6d 03:34 / 00:18 3039 g (6 lb 11.2 oz) M Vag-Spont EPI N PARKER      Birth Comments: scale 4      Complications: History of trauma, Vascular malformation   1 2020 4w0d                ASSESSMENT/DISCHARGE SUMMARY      Active Problems:    Congenital malformation of peripheral vascular system, unspecified      Overview: -Patient reports vascular malformation in upper right arm.  Present since       birth but not genetic.  S/p 5 repairs.  Told it may change with grow with       pregnancy.  Unsure of any other details.      -S/p MFM consult, consider lovenox ppx if pain develops but otherwise no       intervention      -Plan for serial growth Us, normal at 36 wga    FHx: congenital anomaly      Overview: -Patient reports brother passed away at birth from cardiac malformation.        She was soon put in foster system and is unsure of any other details.      -S/p MFM consult, anatomy Us normal, no echo needed    39 weeks gestation of pregnancy      Overview: -PNL: 1hr GTT normal, GBS neg      -Pap: NIL 3/2/23      -Genetics: cfDNA/carrier screen/msAFP neg, anatomy Us normal      -Imm: RI, VNI (h/o vaccine), s/p tdap, s/p covid vaccine/booster, declines       flu shot      -Contraception: likely does not want any      -Birthplan: no epidural, boy, circ, breast      -Care coordination: accepts blood transfusions, no latex allergy, call       schedule reviewed      -Elective IOL scheduled 10/2, arrive at 1600, Yvon will manage          Maternal varicella, non-immune      Overview: H/o vaccine    UTI (urinary tract infection) during pregnancy      Overview: -urine cx pos 23, rx macrobid sent      -repeat urine cx neg     (normal spontaneous vaginal delivery)       Post delivery the patient did well. She was tolerating a regular diet, ambulating, voiding and passing flatus. Post delivery hemoglobin was   Lab Results   Component Value Date    HGB 11.6 (L) 10/04/2023   .      Discharge diagnosis:   Medical Problems       Hospital Problem List       Congenital malformation of peripheral vascular system, unspecified    Overview Addendum 2023 11:33 AM by Yolanda Sanz MD     -Patient  reports vascular malformation in upper right arm.  Present since birth but not genetic.  S/p 5 repairs.  Told it may change with grow with pregnancy.  Unsure of any other details.  -S/p MFM consult, consider lovenox ppx if pain develops but otherwise no intervention  -Plan for serial growth Us, normal at 36 wga         FHx: congenital anomaly    Overview Addendum 2023 11:30 AM by Yolanda Sanz MD     -Patient reports brother passed away at birth from cardiac malformation.  She was soon put in foster system and is unsure of any other details.  -S/p MFM consult, anatomy Us normal, no echo needed         39 weeks gestation of pregnancy    Overview Addendum 2023 10:24 AM by Yolanda Sanz MD     -PNL: 1hr GTT normal, GBS neg  -Pap: NIL 3/2/23  -Genetics: cfDNA/carrier screen/msAFP neg, anatomy Us normal  -Imm: RI, VNI (h/o vaccine), s/p tdap, s/p covid vaccine/booster, declines flu shot  -Contraception: likely does not want any  -Birthplan: no epidural, boy, circ, breast  -Care coordination: accepts blood transfusions, no latex allergy, call schedule reviewed  -Elective IOL scheduled 10/2, arrive at 1600, Yvon will manage           Maternal varicella, non-immune    Overview Signed 3/30/2023  1:28 PM by Yolanda Sanz MD     H/o vaccine         UTI (urinary tract infection) during pregnancy    Overview Addendum 2023 11:32 AM by Yolanda Sanz MD     -urine cx pos 23, rx macrobid sent  -repeat urine cx neg          (normal spontaneous vaginal delivery)                                           39 weeks gestation of pregnancy [Z3A.39]                                     Vaginal Delivery at 39w6d, uncomplicated recovery    On day of discharge, uterus was firm, extremities were non tender with no erythema or masses. Lochia was normal.    Will check U/A    Pt was given prescriptions for Percocet 5/325 and Motrin PRN for pain.    Instructed to call the office to make an  appointment in  6 weeks after your delivery.    Please call the office, or the OB/GYN on-call if after-hours, for any questions, concerns or any of the followin) Fever - a temperature greater than 100.4  2) Uncontrolled pain  3) Uncontrolled bleeding (soaking more than 1 pad in an hour)  4) Foul-smelling discharge from the vagina    Do not place anything in the vagina - this includes tampons, douches or having sex - until after your 6 week postpartum visit .    Hong Catalan MD    10/5/2023  09:32 EDT

## 2023-10-09 ENCOUNTER — POSTPARTUM VISIT (OUTPATIENT)
Dept: OBSTETRICS AND GYNECOLOGY | Age: 23
End: 2023-10-09
Payer: COMMERCIAL

## 2023-10-09 ENCOUNTER — APPOINTMENT (OUTPATIENT)
Dept: CT IMAGING | Facility: HOSPITAL | Age: 23
End: 2023-10-09
Payer: COMMERCIAL

## 2023-10-09 ENCOUNTER — HOSPITAL ENCOUNTER (EMERGENCY)
Facility: HOSPITAL | Age: 23
Discharge: HOME | End: 2023-10-09
Attending: EMERGENCY MEDICINE | Admitting: EMERGENCY MEDICINE
Payer: COMMERCIAL

## 2023-10-09 VITALS
BODY MASS INDEX: 28.32 KG/M2 | WEIGHT: 170 LBS | DIASTOLIC BLOOD PRESSURE: 84 MMHG | HEIGHT: 65 IN | SYSTOLIC BLOOD PRESSURE: 128 MMHG

## 2023-10-09 VITALS
HEART RATE: 92 BPM | DIASTOLIC BLOOD PRESSURE: 92 MMHG | TEMPERATURE: 97.8 F | OXYGEN SATURATION: 100 % | SYSTOLIC BLOOD PRESSURE: 130 MMHG | RESPIRATION RATE: 16 BRPM

## 2023-10-09 DIAGNOSIS — R00.0 SINUS TACHYCARDIA: ICD-10-CM

## 2023-10-09 DIAGNOSIS — R00.2 PALPITATIONS: Primary | ICD-10-CM

## 2023-10-09 DIAGNOSIS — R07.89 CHEST TIGHTNESS: ICD-10-CM

## 2023-10-09 DIAGNOSIS — Z13.89 SCREENING FOR BLOOD OR PROTEIN IN URINE: Primary | ICD-10-CM

## 2023-10-09 LAB
ANION GAP SERPL CALCULATED.3IONS-SCNC: 10 MMOL/L (ref 5–15)
BACTERIA UR QL AUTO: ABNORMAL /HPF
BASOPHILS # BLD AUTO: 0.03 10*3/MM3 (ref 0–0.2)
BASOPHILS NFR BLD AUTO: 0.4 % (ref 0–1.5)
BILIRUB BLD-MCNC: NEGATIVE MG/DL
BILIRUB UR QL STRIP: NEGATIVE
BUN SERPL-MCNC: 9 MG/DL (ref 6–20)
BUN/CREAT SERPL: 14.1 (ref 7–25)
CALCIUM SPEC-SCNC: 9.1 MG/DL (ref 8.6–10.5)
CHLORIDE SERPL-SCNC: 107 MMOL/L (ref 98–107)
CLARITY UR: CLEAR
CLARITY, POC: CLEAR
CO2 SERPL-SCNC: 27 MMOL/L (ref 22–29)
COLOR UR: YELLOW
COLOR UR: YELLOW
CREAT SERPL-MCNC: 0.64 MG/DL (ref 0.57–1)
DEPRECATED RDW RBC AUTO: 40.8 FL (ref 37–54)
EGFRCR SERPLBLD CKD-EPI 2021: 128.3 ML/MIN/1.73
EOSINOPHIL # BLD AUTO: 0.07 10*3/MM3 (ref 0–0.4)
EOSINOPHIL NFR BLD AUTO: 0.9 % (ref 0.3–6.2)
ERYTHROCYTE [DISTWIDTH] IN BLOOD BY AUTOMATED COUNT: 12.2 % (ref 12.3–15.4)
GLUCOSE SERPL-MCNC: 90 MG/DL (ref 65–99)
GLUCOSE UR STRIP-MCNC: NEGATIVE MG/DL
GLUCOSE UR STRIP-MCNC: NEGATIVE MG/DL
HCT VFR BLD AUTO: 37.2 % (ref 34–46.6)
HGB BLD-MCNC: 12.6 G/DL (ref 12–15.9)
HGB UR QL STRIP.AUTO: ABNORMAL
HYALINE CASTS UR QL AUTO: ABNORMAL /LPF
IMM GRANULOCYTES # BLD AUTO: 0.02 10*3/MM3 (ref 0–0.05)
IMM GRANULOCYTES NFR BLD AUTO: 0.3 % (ref 0–0.5)
KETONES UR QL STRIP: NEGATIVE
KETONES UR QL: NEGATIVE
LEUKOCYTE EST, POC: ABNORMAL
LEUKOCYTE ESTERASE UR QL STRIP.AUTO: ABNORMAL
LYMPHOCYTES # BLD AUTO: 2.03 10*3/MM3 (ref 0.7–3.1)
LYMPHOCYTES NFR BLD AUTO: 26.9 % (ref 19.6–45.3)
MAGNESIUM SERPL-MCNC: 2.1 MG/DL (ref 1.6–2.6)
MCH RBC QN AUTO: 31 PG (ref 26.6–33)
MCHC RBC AUTO-ENTMCNC: 33.9 G/DL (ref 31.5–35.7)
MCV RBC AUTO: 91.6 FL (ref 79–97)
MONOCYTES # BLD AUTO: 0.45 10*3/MM3 (ref 0.1–0.9)
MONOCYTES NFR BLD AUTO: 6 % (ref 5–12)
NEUTROPHILS NFR BLD AUTO: 4.95 10*3/MM3 (ref 1.7–7)
NEUTROPHILS NFR BLD AUTO: 65.5 % (ref 42.7–76)
NITRITE UR QL STRIP: NEGATIVE
NITRITE UR-MCNC: NEGATIVE MG/ML
NRBC BLD AUTO-RTO: 0 /100 WBC (ref 0–0.2)
NT-PROBNP SERPL-MCNC: 43.7 PG/ML (ref 0–450)
PH UR STRIP.AUTO: 7 [PH] (ref 5–8)
PH UR: 6 [PH] (ref 5–8)
PLATELET # BLD AUTO: 263 10*3/MM3 (ref 140–450)
PMV BLD AUTO: 8.6 FL (ref 6–12)
POTASSIUM SERPL-SCNC: 3.7 MMOL/L (ref 3.5–5.2)
PROT UR QL STRIP: NEGATIVE
PROT UR STRIP-MCNC: NEGATIVE MG/DL
QT INTERVAL: 338 MS
QTC INTERVAL: 423 MS
RBC # BLD AUTO: 4.06 10*6/MM3 (ref 3.77–5.28)
RBC # UR STRIP: ABNORMAL /HPF
RBC # UR STRIP: ABNORMAL /UL
REF LAB TEST METHOD: ABNORMAL
SODIUM SERPL-SCNC: 144 MMOL/L (ref 136–145)
SP GR UR STRIP: 1.01 (ref 1–1.03)
SP GR UR: 1.01 (ref 1–1.03)
SQUAMOUS #/AREA URNS HPF: ABNORMAL /HPF
TROPONIN T SERPL HS-MCNC: <6 NG/L
UROBILINOGEN UR QL STRIP: ABNORMAL
UROBILINOGEN UR QL: NORMAL
WBC # UR STRIP: ABNORMAL /HPF
WBC NRBC COR # BLD: 7.55 10*3/MM3 (ref 3.4–10.8)

## 2023-10-09 PROCEDURE — 71275 CT ANGIOGRAPHY CHEST: CPT

## 2023-10-09 PROCEDURE — 93005 ELECTROCARDIOGRAM TRACING: CPT | Performed by: EMERGENCY MEDICINE

## 2023-10-09 PROCEDURE — 80048 BASIC METABOLIC PNL TOTAL CA: CPT | Performed by: EMERGENCY MEDICINE

## 2023-10-09 PROCEDURE — 83880 ASSAY OF NATRIURETIC PEPTIDE: CPT | Performed by: EMERGENCY MEDICINE

## 2023-10-09 PROCEDURE — 85025 COMPLETE CBC W/AUTO DIFF WBC: CPT | Performed by: EMERGENCY MEDICINE

## 2023-10-09 PROCEDURE — 83735 ASSAY OF MAGNESIUM: CPT | Performed by: EMERGENCY MEDICINE

## 2023-10-09 PROCEDURE — 81001 URINALYSIS AUTO W/SCOPE: CPT | Performed by: EMERGENCY MEDICINE

## 2023-10-09 PROCEDURE — 99285 EMERGENCY DEPT VISIT HI MDM: CPT

## 2023-10-09 PROCEDURE — 84484 ASSAY OF TROPONIN QUANT: CPT | Performed by: EMERGENCY MEDICINE

## 2023-10-09 PROCEDURE — 93005 ELECTROCARDIOGRAM TRACING: CPT

## 2023-10-09 PROCEDURE — 25510000001 IOPAMIDOL PER 1 ML: Performed by: EMERGENCY MEDICINE

## 2023-10-09 RX ORDER — PRENATAL VIT NO.126/IRON/FOLIC 28MG-0.8MG
TABLET ORAL DAILY
COMMUNITY

## 2023-10-09 RX ORDER — SODIUM CHLORIDE 0.9 % (FLUSH) 0.9 %
10 SYRINGE (ML) INJECTION AS NEEDED
Status: DISCONTINUED | OUTPATIENT
Start: 2023-10-09 | End: 2023-10-09 | Stop reason: HOSPADM

## 2023-10-09 RX ADMIN — IOPAMIDOL 85 ML: 755 INJECTION, SOLUTION INTRAVENOUS at 15:45

## 2023-10-09 NOTE — DISCHARGE INSTRUCTIONS
Follow-up with your OB/GYN as soon as possible.  Return to the emergency department for worsening/persistent symptoms, fever, shortness of breath, leg pain, leg swelling, dizziness, fainting, or other concern.

## 2023-10-09 NOTE — PROGRESS NOTES
"    Patient presents for a postpartum visit. She is 6 days postpartum following a spontaneous vaginal delivery. I have fully reviewed the prenatal and intrapartum course. The delivery was at 39 gestational weeks. Outcome: spontaneous vaginal delivery. Anesthesia: epidural. Postpartum course has been ok. Baby's course has been good. Baby is feeding by breast. Bleeding staining only. Bowel function is normal. Bladder function is normal. Patient is not sexually active. Contraception method is abstinence. Postpartum depression screening: negative.    Is noting chest tightness  Also notes elevated HR when this happens  Intermittent  No h/o this    Has floaters occly  Had something similar when she was pregnant and lost vision in her left eye  Denies \"painful\" migraines but notes h/o ocular migraines  Has never seen neurologist for this    BP is normal today    Has some occl aches when her breasts are full  Is getting better with breastfeeding   He does seem to get full and she is producing more then enough milk    Has a venous malformation in her right bicep  Had full w/u with MFM and no blood thinners were indicated   She has had no significant issues with this other then skin changes in the past  Denies h/o blood clots    Accompanied by partner and baby today  Baby is good  Did sleep well last night which has made her feel better    The following portions of the patient's history were reviewed and updated as appropriate: allergies, current medications, past family history, past medical history, past social history, past surgical history, and problem list.    Review of Systems  Genitourinary:positive for vaginal swelling        Vitals:    10/09/23 1112   BP: 128/84       General:  alert, appears stated age, and cooperative    Breasts:  na   Lungs: na   Heart:  regular rate and rhythm and HR 96 bpm   Abdomen: na    Vulva:  normal  Labia with sutures present on the right labia minora, healing well, no s/s of infection "   Vagina: normal vagina   Cervix:  na   Corpus: not examined   Adnexa:  not evaluated   Rectal Exam: Not performed.         6 day postpartum exam. Pap smear not done at today's visit.    1. Contraception: abstinence  2. Disc poc with Dr Sanz. Given the chest tightness and elevated HR, it is best to have her evaluated at the ER to r/o PE. D/w that these could also be s/s of anxiety. She denies depression but notes anxiety in regards to her health. Disc that t his could be treated with meds if pt desires but also suggested trying otc benadryl in acute situations.    3. Follow up in: 5 weeks or as needed.  Labia healing well  Disc the aches may be r/t full breasts. No temp so monitor and consistent BF schedule will help prevent this. Call if temp elevated.   Disc visions changes, BP normal and no s/s of eclampsia. Suspect ocular component. Will monitor and consider neurology w/u if sxs still present at her pp visit

## 2023-10-09 NOTE — ED NOTES
Pt is 6 days postpartum and is having chest tightness and it feels like her heart is pounding.  She is seeing floaters in both eyes.  She was sent by obgyn to r/o PE

## 2023-10-09 NOTE — ED PROVIDER NOTES
EMERGENCY DEPARTMENT ENCOUNTER    Room Number:  32/32  PCP: Neva Ortiz APRN  Historian: Patient      HPI:  Chief Complaint: Chest tightness, palpitations  A complete HPI/ROS/PMH/PSH/SH/FH are unobtainable due to: Nothing  Context: Mayra Garner is a 23 y.o. female who presents to the ED from her OBs office c/o chest tightness and palpitations.  Patient is 6 days postpartum after spontaneous vaginal delivery.  She complains of intermittent tightness in her chest since delivery.  She also complains of palpitations.  States it feels like her heart is racing and beating fast.  Denies cough, fever, shortness of breath, leg pain, or leg swelling.  She saw her OB PA earlier today and was sent to the ED for further evaluation.  She also complains of intermittent floaters in both eyes, worse on the right.  The symptoms have been intermittent for the past couple of months.  Denies headache, fever, leg swelling, abdominal pain, or numbness/tingling/weakness in her extremities.            PAST MEDICAL HISTORY  Active Ambulatory Problems     Diagnosis Date Noted    Congenital malformation of peripheral vascular system, unspecified 2015    FHx: congenital anomaly 2023    39 weeks gestation of pregnancy 2023    Maternal varicella, non-immune 2023    Pregnancy headache in third trimester 2023    UTI (urinary tract infection) during pregnancy 2023     (normal spontaneous vaginal delivery) 10/03/2023     Resolved Ambulatory Problems     Diagnosis Date Noted    Malaise and fatigue 2022    Adenopathy 2022    Pain 2016    39 weeks gestation of pregnancy 10/02/2023     Past Medical History:   Diagnosis Date    Trauma     Vascular abnormality          PAST SURGICAL HISTORY  Past Surgical History:   Procedure Laterality Date    OTHER SURGICAL HISTORY Right     Arm sclerotherapy x5         FAMILY HISTORY  Family History   Adopted: Yes   Problem Relation Age of Onset     Breast cancer Paternal Grandmother          SOCIAL HISTORY  Social History     Socioeconomic History    Marital status: Single    Number of children: 0    Highest education level: Some college, no degree   Tobacco Use    Smoking status: Never     Passive exposure: Never    Smokeless tobacco: Never   Vaping Use    Vaping Use: Never used   Substance and Sexual Activity    Alcohol use: Not Currently    Drug use: Not Currently     Comment: in highschool a couple times    Sexual activity: Yes     Partners: Male     Birth control/protection: None         ALLERGIES  Iodine and Shellfish-derived products    REVIEW OF SYSTEMS  All systems have been reviewed and are negative except for those listed in the HPI      PHYSICAL EXAM  ED Triage Vitals   Temp Heart Rate Resp BP SpO2   10/09/23 1348 10/09/23 1348 10/09/23 1348 10/09/23 1351 10/09/23 1348   97.8 øF (36.6 øC) 112 16 132/92 98 %      Temp src Heart Rate Source Patient Position BP Location FiO2 (%)   10/09/23 1348 10/09/23 1348 -- -- --   Tympanic Monitor          Physical Exam      GENERAL: Awake, alert, oriented x3.  Well-developed, well-nourished female.  Resting comfortably in no acute distress  HENT: NCAT, nares patent  EYES: PERRL, EOMI  CV: regular rhythm, normal rate, equal radial pulses bilaterally  RESPIRATORY: normal effort, clear to auscultation bilaterally  ABDOMEN: soft, nondistended, nontender  MUSCULOSKELETAL: Extremities are nontender with full range of motion.  No calf tenderness.  No pedal edema  NEURO: Speech is normal.  No facial droop.  Normal strength and light touch sensation in all extremities  PSYCH:  calm, cooperative  SKIN: warm, dry    Vital signs and nursing notes reviewed.          LAB RESULTS  Recent Results (from the past 24 hour(s))   POC Urinalysis Dipstick, Multipro    Collection Time: 10/09/23 11:32 AM    Specimen: Urine   Result Value Ref Range    Color Yellow Yellow, Straw, Dark Yellow, Yuko    Clarity, UA Clear Clear    Glucose,  UA Negative Negative mg/dL    Bilirubin Negative Negative    Ketones, UA Negative Negative    Specific Gravity  1.010 1.005 - 1.030    Blood, UA Small (A) Negative    pH, Urine 6.0 5.0 - 8.0    Protein, POC Negative Negative mg/dL    Urobilinogen, UA Normal Normal, 0.2 E.U./dL    Nitrite, UA Negative Negative    Leukocytes Trace (A) Negative   ECG 12 Lead Chest Pain    Collection Time: 10/09/23  1:52 PM   Result Value Ref Range    QT Interval 338 ms    QTC Interval 423 ms   Basic Metabolic Panel    Collection Time: 10/09/23  2:26 PM    Specimen: Blood   Result Value Ref Range    Glucose 90 65 - 99 mg/dL    BUN 9 6 - 20 mg/dL    Creatinine 0.64 0.57 - 1.00 mg/dL    Sodium 144 136 - 145 mmol/L    Potassium 3.7 3.5 - 5.2 mmol/L    Chloride 107 98 - 107 mmol/L    CO2 27.0 22.0 - 29.0 mmol/L    Calcium 9.1 8.6 - 10.5 mg/dL    BUN/Creatinine Ratio 14.1 7.0 - 25.0    Anion Gap 10.0 5.0 - 15.0 mmol/L    eGFR 128.3 >60.0 mL/min/1.73   Urinalysis With Microscopic If Indicated (No Culture) - Urine, Clean Catch    Collection Time: 10/09/23  2:26 PM    Specimen: Urine, Clean Catch   Result Value Ref Range    Color, UA Yellow Yellow, Straw    Appearance, UA Clear Clear    pH, UA 7.0 5.0 - 8.0    Specific Gravity, UA 1.006 1.005 - 1.030    Glucose, UA Negative Negative    Ketones, UA Negative Negative    Bilirubin, UA Negative Negative    Blood, UA Moderate (2+) (A) Negative    Protein, UA Negative Negative    Leuk Esterase, UA Small (1+) (A) Negative    Nitrite, UA Negative Negative    Urobilinogen, UA 1.0 E.U./dL 0.2 - 1.0 E.U./dL   BNP    Collection Time: 10/09/23  2:26 PM    Specimen: Blood   Result Value Ref Range    proBNP 43.7 0.0 - 450.0 pg/mL   Single High Sensitivity Troponin T    Collection Time: 10/09/23  2:26 PM    Specimen: Blood   Result Value Ref Range    HS Troponin T <6 <10 ng/L   Magnesium    Collection Time: 10/09/23  2:26 PM    Specimen: Blood   Result Value Ref Range    Magnesium 2.1 1.6 - 2.6 mg/dL   CBC  Auto Differential    Collection Time: 10/09/23  2:26 PM    Specimen: Blood   Result Value Ref Range    WBC 7.55 3.40 - 10.80 10*3/mm3    RBC 4.06 3.77 - 5.28 10*6/mm3    Hemoglobin 12.6 12.0 - 15.9 g/dL    Hematocrit 37.2 34.0 - 46.6 %    MCV 91.6 79.0 - 97.0 fL    MCH 31.0 26.6 - 33.0 pg    MCHC 33.9 31.5 - 35.7 g/dL    RDW 12.2 (L) 12.3 - 15.4 %    RDW-SD 40.8 37.0 - 54.0 fl    MPV 8.6 6.0 - 12.0 fL    Platelets 263 140 - 450 10*3/mm3    Neutrophil % 65.5 42.7 - 76.0 %    Lymphocyte % 26.9 19.6 - 45.3 %    Monocyte % 6.0 5.0 - 12.0 %    Eosinophil % 0.9 0.3 - 6.2 %    Basophil % 0.4 0.0 - 1.5 %    Immature Grans % 0.3 0.0 - 0.5 %    Neutrophils, Absolute 4.95 1.70 - 7.00 10*3/mm3    Lymphocytes, Absolute 2.03 0.70 - 3.10 10*3/mm3    Monocytes, Absolute 0.45 0.10 - 0.90 10*3/mm3    Eosinophils, Absolute 0.07 0.00 - 0.40 10*3/mm3    Basophils, Absolute 0.03 0.00 - 0.20 10*3/mm3    Immature Grans, Absolute 0.02 0.00 - 0.05 10*3/mm3    nRBC 0.0 0.0 - 0.2 /100 WBC   Urinalysis, Microscopic Only - Urine, Clean Catch    Collection Time: 10/09/23  2:26 PM    Specimen: Urine, Clean Catch   Result Value Ref Range    RBC, UA 0-2 None Seen, 0-2 /HPF    WBC, UA 3-5 (A) None Seen, 0-2 /HPF    Bacteria, UA None Seen None Seen /HPF    Squamous Epithelial Cells, UA 0-2 None Seen, 0-2 /HPF    Hyaline Casts, UA None Seen None Seen /LPF    Methodology Automated Microscopy        Ordered the above labs and reviewed the results.        RADIOLOGY  CT Angiogram Chest    Result Date: 10/9/2023  CT ANGIOGRAM OF THE CHEST WITH CONTRAST INCLUDING RECONSTRUCTION IMAGES 10/9/2023  HISTORY: Chest pain. Postpartum. Possible pulmonary embolus.  Following the intravenous contrast injection CT angiography was performed through the chest. Sagittal, coronal and 3D reconstruction images were reviewed.  The pulmonary arterial system is well opacified with no evidence of pulmonary embolus.  There is some increased soft tissue in the anterior mediastinum  likely residual thymus tissue. No pathologically enlarged hilar or mediastinal lymph nodes are seen.  No lung masses or significant pulmonary infiltrates are seen.      1. No evidence of pulmonary embolus.  Radiation dose reduction techniques were utilized, including automated exposure control and exposure modulation based on body size.   This report was finalized on 10/9/2023 4:34 PM by Dr. Sharad Moura M.D on Workstation: [employer]       Ordered the above noted radiological studies. Reviewed by me in PACS.            PROCEDURES  Procedures              MEDICATIONS GIVEN IN ER  Medications   iopamidol (ISOVUE-370) 76 % injection 100 mL (85 mL Intravenous Given by Other 10/9/23 5118)                   MEDICAL DECISION MAKING, PROGRESS, and CONSULTS    All labs have been independently reviewed by me.  All radiology studies have been reviewed by me and I have also reviewed the radiology report.   EKG's independently viewed and interpreted by me.  Discussion below represents my analysis of pertinent findings related to patient's condition, differential diagnosis, treatment plan and final disposition.      Additional sources:  - Discussed/ obtained information from independent historians: Significant other at bedside    - External (non-ED) record review: Patient was admitted here 10/2 through 10/5/2023 for spontaneous vaginal delivery. Patient saw her OB/GYN PA earlier today for postpartum visit.  Patient was complaining of chest tightness and palpitations.  Her blood pressure was normal.  She was sent to the ED for further evaluation.    - Chronic or social conditions impacting care: None          Orders placed during this visit:  Orders Placed This Encounter   Procedures    CT Angiogram Chest    Basic Metabolic Panel    Urinalysis With Microscopic If Indicated (No Culture) - Urine, Clean Catch    BNP    Single High Sensitivity Troponin T    Magnesium    CBC Auto Differential    Urinalysis, Microscopic Only -  Urine, Clean Catch    Monitor Blood Pressure    Pulse Oximetry, Continuous    ECG 12 Lead Chest Pain    CBC & Differential         Additional orders considered but not ordered:  None        Differential diagnosis:    DDx includes but is not limited to acute coronary syndrome, pulmonary embolism, thoracic aortic dissection, pneumonia, pneumothorax, musculoskeletal pain, GERD or esophageal spasm, anxiety, myocarditis/pericarditis, esophageal rupture, pancreatitis.           Independent interpretation of labs, radiology studies, and discussions with consultants:  ED Course as of 10/09/23 1943   Mon Oct 09, 2023   1359 BP: 132/92 [WH]   1359 Heart Rate: 112 [WH]   1359 Resp: 16 [WH]   1359 SpO2: 98 %  On room air [WH]   1359 Temp: 97.8 øF (36.6 øC) [WH]   1409 EKG personally interpreted by me.  My personal interpretation is:          EKG time: 1352  Rhythm/Rate: Sinus rhythm, rate 94  P waves and GA: Normal  QRS, axis: Normal  ST and T waves: Normal    Interpreted Contemporaneously by me, independently viewed  No prior available for comparison    [WH]   1503 BP: 141/90 [WH]   1522 RBC, UA: 0-2 [WH]   1522 WBC, UA(!): 3-5 [WH]   1522 Bacteria, UA: None Seen [WH]   1522 Protein, UA: Negative [WH]   1522 Leukocytes, UA(!): Small (1+) [WH]   1522 Nitrite, UA: Negative [WH]   1522 Hemoglobin: 12.6 [WH]   1523 Platelets: 263 [WH]   1523 HS Troponin T: <6 [WH]   1535 BP: 131/92 [WH]   1606 CTA chest personally interpreted by me.  My personal interpretation is: No central PE.  No infiltrates.  No pleural effusion. [WH]   1649 Per the radiologist, CTA of the chest is negative for PE. [WH]   1658 Test results discussed with the patient and her significant other.  She is resting and breathing comfortably.  Heart rate is in the 80s.  Blood pressure is 127/95.  Her work-up is unremarkable.  She does not have protein in her urine.  CTA chest is unremarkable.  Patient was advised to follow-up with her obstetrician.  Return precautions  were discussed. [WH]   3194 MDM: Patient was sent to the ED from her OBs office with complaints of chest tightness and palpitations.  She is 6 days postpartum from a vaginal delivery.  She was initially tachycardic with a rate of 112.  EKG was normal.  She was not tachypneic or hypoxic.  CTA of the chest was negative acute.  Blood pressure remained stable.  She did not have protein in her urine.  She denied headache.  There is no leg swelling.  Have low suspicion for eclampsia.  Patient's heart rate decreased in the ED without intervention.  Patient was advised to follow-up with her obstetrician. [WH]      ED Course User Index  [WH] Van Samuel MD               DIAGNOSIS  Final diagnoses:   Palpitations   Chest tightness   Postpartum state   Sinus tachycardia         DISPOSITION  DISCHARGE    Patient discharged in stable condition.    Reviewed implications of results, diagnosis, meds, responsibility to follow up, warning signs and symptoms of possible worsening, potential complications and reasons to return to ER, including worsening/persistent symptoms, shortness of breath, dizziness, fainting, leg swelling, headache, or other concern.    Patient/Family voiced understanding of above instructions.    Discussed plan for discharge, as there is no emergent indication for admission. Patient referred to primary care provider for BP management due to today's BP. Pt/family is agreeable and understands need for follow up and repeat testing.  Pt is aware that discharge does not mean that nothing is wrong but it indicates no emergency is present that requires admission and they must continue care with follow-up as given below or physician of their choice.     FOLLOW-UP  Yolanda Sanz MD  [address] [address]  [phone]    Schedule an appointment as soon as possible for a visit            Medication List      No changes were made to your prescriptions during this visit.                    Latest Documented Vital Signs:  As of 19:43 EDT  BP- 130/92 HR- 92 Temp- 97.8 øF (36.6 øC) (Tympanic) O2 sat- 100%              --    Please note that portions of this were completed with a voice recognition program.       Note Disclaimer: At [facility], we believe that sharing information builds trust and better relationships. You are receiving this note because you are receiving care at [facility] or recently visited. It is possible you will see health information before a provider has talked with you about it. This kind of information can be easy to misunderstand. To help you fully understand what it means for your health, we urge you to discuss this note with your provider.             Van Samuel MD  10/09/23 1943

## 2023-10-12 ENCOUNTER — POSTPARTUM VISIT (OUTPATIENT)
Dept: OBSTETRICS AND GYNECOLOGY | Age: 23
End: 2023-10-12
Payer: COMMERCIAL

## 2023-10-12 VITALS
DIASTOLIC BLOOD PRESSURE: 70 MMHG | WEIGHT: 170.4 LBS | BODY MASS INDEX: 28.39 KG/M2 | SYSTOLIC BLOOD PRESSURE: 124 MMHG | HEIGHT: 65 IN

## 2023-10-12 DIAGNOSIS — R82.90 BAD ODOR OF URINE: ICD-10-CM

## 2023-10-12 PROBLEM — Z28.39 MATERNAL VARICELLA, NON-IMMUNE: Status: RESOLVED | Noted: 2023-03-30 | Resolved: 2023-10-12

## 2023-10-12 PROBLEM — Z82.79: Status: RESOLVED | Noted: 2023-03-02 | Resolved: 2023-10-12

## 2023-10-12 PROBLEM — O23.40 UTI (URINARY TRACT INFECTION) DURING PREGNANCY: Status: RESOLVED | Noted: 2023-08-11 | Resolved: 2023-10-12

## 2023-10-12 PROBLEM — Z3A.39 39 WEEKS GESTATION OF PREGNANCY: Status: RESOLVED | Noted: 2023-03-02 | Resolved: 2023-10-12

## 2023-10-12 PROBLEM — O09.899 MATERNAL VARICELLA, NON-IMMUNE: Status: RESOLVED | Noted: 2023-03-30 | Resolved: 2023-10-12

## 2023-10-12 PROBLEM — O26.893 PREGNANCY HEADACHE IN THIRD TRIMESTER: Status: RESOLVED | Noted: 2023-03-30 | Resolved: 2023-10-12

## 2023-10-12 PROBLEM — R51.9 PREGNANCY HEADACHE IN THIRD TRIMESTER: Status: RESOLVED | Noted: 2023-03-30 | Resolved: 2023-10-12

## 2023-10-12 LAB
BILIRUB BLD-MCNC: NEGATIVE MG/DL
CLARITY, POC: CLEAR
COLOR UR: YELLOW
GLUCOSE UR STRIP-MCNC: NEGATIVE MG/DL
KETONES UR QL: NEGATIVE
LEUKOCYTE EST, POC: ABNORMAL
NITRITE UR-MCNC: NEGATIVE MG/ML
PH UR: 6 [PH] (ref 5–8)
PROT UR STRIP-MCNC: NEGATIVE MG/DL
RBC # UR STRIP: ABNORMAL /UL
SP GR UR: 1.01 (ref 1–1.03)
UROBILINOGEN UR QL: ABNORMAL

## 2023-10-12 NOTE — PROGRESS NOTES
"[facility]   Obstetrics and Gynecology   Postpartum Visit    10/12/2023    Patient: Mayra Garner          MR#:[id number]    History of Present Illness    Chief Complaint   Patient presents with    Postpartum Care     PP 9 days  10/03/2023 male \"Ar\", Breastfeeding, c/o urine odor       23 y.o. female  status post  10/3/23 presents for postpartum visit without complaints.   Breast-feeding without issue.  Bleeding minimal.  No intercourse since delivery.  Reports odor to urine.   Also had increased anxiety postpartum.  Really severe for first few days and now feeling better.  Got engaged a few days ago as well which is very exciting.    Baby Ar is doing great.    Contraception: declines  Vaccines: RI, h/o varivax, s/p tdap  Pap: NIL 3/2/23  ________________________________________  Patient Active Problem List   Diagnosis    Congenital malformation of peripheral vascular system, unspecified     (normal spontaneous vaginal delivery)       Past Medical History:   Diagnosis Date    Trauma     10 years old    Vascular abnormality     Right Arm        Past Surgical History:   Procedure Laterality Date    OTHER SURGICAL HISTORY Right     Arm sclerotherapy x5       Social History     Tobacco Use   Smoking Status Never    Passive exposure: Never   Smokeless Tobacco Never       has a current medication list which includes the following prescription(s): acetaminophen, prenatal (classic) vitamin, cephalexin, and ibuprofen.  ________________________________________         Review of Systems   All other systems reviewed and are negative.      Objective     /70   Ht 165.1 cm (65\")   Wt 77.3 kg (170 lb 6.4 oz)   Breastfeeding Yes   BMI 28.36 kg/mý    BP Readings from Last 3 Encounters:   10/12/23 124/70   10/09/23 130/92   10/09/23 128/84      Wt Readings from Last 3 Encounters:   10/12/23 77.3 kg (170 lb 6.4 oz)   10/09/23 77.1 kg (170 lb)   10/02/23 85 kg (187 lb 6.3 oz)      BMI: " "Estimated body mass index is 28.36 kg/mý as calculated from the following:    Height as of this encounter: 165.1 cm (65\").    Weight as of this encounter: 77.3 kg (170 lb 6.4 oz).    EXAM     General:     Patient appears well in NAD  Respiratory: Normal effort, no distress  Breast:  declined  Abdomen: Soft, NT, no acute findings  Pelvic:  Scant lochia, perineum without signs of infection, lacerations healing well  Ext:  No cyanosis or edema    Assessment:    Diagnoses and all orders for this visit:    1. Postpartum follow-up (Primary)  -     Urine Culture - Urine, Urine, Clean Catch    2. Bad odor of urine  -     POC Urinalysis Dipstick  -     Urine Culture - Urine, Urine, Clean Catch    3.  (normal spontaneous vaginal delivery)    -Postpartum anxiety improving.  Declines medications.  Discussed monitoring for SI/HI or any worsening symptoms.  - Breast-feeding without issue  - UA positive for leuks and blood, will send for culture  - Will likely use condoms for contraception but will consider her options and let me know the next visit    Plan:  Return in about 4 weeks (around 2023) for 6wk postpartum visit.    I spent 20 minutes caring for Mayra Garner on this date of service. This time includes time spent by me in the following activities: preparing for the visit, reviewing tests, obtaining and/or reviewing a separately obtained history, performing a medically appropriate examination and/or evaluation, counseling and educating the patient/family/caregiver, ordering medications, tests, or procedures and documenting information in the medical record.  This time does NOT include time spent on separately reported services.    Yolanda Sanz MD  10/12/2023 10:24 EDT  "

## 2023-10-16 ENCOUNTER — TELEPHONE (OUTPATIENT)
Dept: OBSTETRICS AND GYNECOLOGY | Age: 23
End: 2023-10-16

## 2023-10-17 LAB
BACTERIA UR CULT: ABNORMAL
BACTERIA UR CULT: ABNORMAL
OTHER ANTIBIOTIC SUSC ISLT: ABNORMAL

## 2023-10-18 ENCOUNTER — TELEPHONE (OUTPATIENT)
Dept: OBSTETRICS AND GYNECOLOGY | Age: 23
End: 2023-10-18
Payer: COMMERCIAL

## 2023-10-18 RX ORDER — NITROFURANTOIN 25; 75 MG/1; MG/1
100 CAPSULE ORAL 2 TIMES DAILY
Qty: 14 CAPSULE | Refills: 0 | OUTPATIENT
Start: 2023-10-18 | End: 2023-10-20

## 2023-10-19 ENCOUNTER — PATIENT MESSAGE (OUTPATIENT)
Dept: OBSTETRICS AND GYNECOLOGY | Age: 23
End: 2023-10-19
Payer: COMMERCIAL

## 2023-10-19 RX ORDER — NITROFURANTOIN 25; 75 MG/1; MG/1
100 CAPSULE ORAL 2 TIMES DAILY
Qty: 14 CAPSULE | Refills: 0 | OUTPATIENT
Start: 2023-10-19 | End: 2023-10-20

## 2023-10-20 ENCOUNTER — HOSPITAL ENCOUNTER (EMERGENCY)
Facility: HOSPITAL | Age: 23
Discharge: HOME | End: 2023-10-20
Payer: COMMERCIAL

## 2023-10-20 ENCOUNTER — TELEPHONE (OUTPATIENT)
Dept: OBSTETRICS AND GYNECOLOGY | Age: 23
End: 2023-10-20
Payer: COMMERCIAL

## 2023-10-20 ENCOUNTER — APPOINTMENT (OUTPATIENT)
Dept: GENERAL RADIOLOGY | Facility: HOSPITAL | Age: 23
End: 2023-10-20
Payer: COMMERCIAL

## 2023-10-20 VITALS
WEIGHT: 165 LBS | OXYGEN SATURATION: 100 % | RESPIRATION RATE: 18 BRPM | HEIGHT: 65 IN | SYSTOLIC BLOOD PRESSURE: 138 MMHG | TEMPERATURE: 99 F | BODY MASS INDEX: 27.49 KG/M2 | HEART RATE: 111 BPM | DIASTOLIC BLOOD PRESSURE: 92 MMHG

## 2023-10-20 DIAGNOSIS — J06.9 UPPER RESPIRATORY TRACT INFECTION, UNSPECIFIED TYPE: ICD-10-CM

## 2023-10-20 DIAGNOSIS — R50.9 FEVER, UNSPECIFIED FEVER CAUSE: Primary | ICD-10-CM

## 2023-10-20 LAB
BACTERIA UR QL AUTO: NORMAL /HPF
BILIRUB UR QL STRIP: NEGATIVE
CLARITY UR: CLEAR
COLOR UR: YELLOW
FLUAV SUBTYP SPEC NAA+PROBE: NOT DETECTED
FLUBV RNA ISLT QL NAA+PROBE: NOT DETECTED
GLUCOSE UR STRIP-MCNC: NEGATIVE MG/DL
HGB UR QL STRIP.AUTO: NEGATIVE
HYALINE CASTS UR QL AUTO: NORMAL /LPF
KETONES UR QL STRIP: NEGATIVE
LEUKOCYTE ESTERASE UR QL STRIP.AUTO: ABNORMAL
NITRITE UR QL STRIP: NEGATIVE
PH UR STRIP.AUTO: 6 [PH] (ref 5–8)
PROT UR QL STRIP: NEGATIVE
RBC # UR STRIP: NORMAL /HPF
REF LAB TEST METHOD: NORMAL
SARS-COV-2 RNA RESP QL NAA+PROBE: NOT DETECTED
SP GR UR STRIP: <=1.005 (ref 1–1.03)
SQUAMOUS #/AREA URNS HPF: NORMAL /HPF
STREP A PCR: NOT DETECTED
UROBILINOGEN UR QL STRIP: ABNORMAL
WBC # UR STRIP: NORMAL /HPF

## 2023-10-20 PROCEDURE — 87651 STREP A DNA AMP PROBE: CPT | Performed by: EMERGENCY MEDICINE

## 2023-10-20 PROCEDURE — 81001 URINALYSIS AUTO W/SCOPE: CPT | Performed by: PHYSICIAN ASSISTANT

## 2023-10-20 PROCEDURE — 87636 SARSCOV2 & INF A&B AMP PRB: CPT | Performed by: EMERGENCY MEDICINE

## 2023-10-20 PROCEDURE — 71045 X-RAY EXAM CHEST 1 VIEW: CPT

## 2023-10-20 PROCEDURE — 25810000003 SODIUM CHLORIDE 0.9 % SOLUTION: Performed by: PHYSICIAN ASSISTANT

## 2023-10-20 PROCEDURE — 99283 EMERGENCY DEPT VISIT LOW MDM: CPT

## 2023-10-20 RX ORDER — CEPHALEXIN 500 MG/1
500 CAPSULE ORAL 3 TIMES DAILY
Qty: 21 CAPSULE | Refills: 0 | Status: SHIPPED | OUTPATIENT
Start: 2023-10-20 | End: 2023-10-27

## 2023-10-20 RX ORDER — ACETAMINOPHEN 500 MG
1000 TABLET ORAL ONCE
Status: COMPLETED | OUTPATIENT
Start: 2023-10-20 | End: 2023-10-20

## 2023-10-20 RX ADMIN — ACETAMINOPHEN 1000 MG: 500 TABLET ORAL at 12:56

## 2023-10-20 RX ADMIN — SODIUM CHLORIDE 1000 ML: 9 INJECTION, SOLUTION INTRAVENOUS at 12:25

## 2023-10-20 NOTE — DISCHARGE INSTRUCTIONS
As discussed, I advise that you buy a pulse oximeter at a pharmacy to monitor your oxygen and heart rate.  If your oxygen level drops to 92% or lower, return to the ER.  Stop the Macrobid and start the Keflex as prescribed.  Continue to alternate between Tylenol and ibuprofen for your fever.  Follow-up with your OB/GYN early next week to recheck your symptoms.

## 2023-10-20 NOTE — FSED PROVIDER NOTE
Subjective   History of Present Illness    Patient is complaining of fever, chills, generalized body aches which started this morning.  Highest temperature at home was 101.  Patient denies cough, chest pain, shortness of breath.    Patient reports that she is approximately 2.5 weeks postpartum , vaginal delivery without any complications.  She reports that last week she developed a foul urine odor and her OB/GYN Dr. Malcolm prescribed her Macrobid.  Patient started the Macrobid approximately 3 days ago.  Denies any dysuria, hematuria, urinary frequency or urgency.    Patient denies any lower abdominal pain or back pain.    Review of Systems   Constitutional:  Positive for fever. Negative for activity change and appetite change.   Eyes:  Negative for pain.   Respiratory:  Negative for shortness of breath.    Gastrointestinal:  Negative for nausea and vomiting.   Musculoskeletal:  Positive for myalgias. Negative for arthralgias.   Skin:  Negative for color change.   Neurological:  Negative for dizziness.   All other systems reviewed and are negative.      Past Medical History:   Diagnosis Date    Trauma     10 years old    Vascular abnormality     Right Arm        Allergies   Allergen Reactions    Iodine Anaphylaxis    Shellfish-Derived Products Anaphylaxis       Past Surgical History:   Procedure Laterality Date    OTHER SURGICAL HISTORY Right     Arm sclerotherapy x5       Family History   Adopted: Yes   Problem Relation Age of Onset    Breast cancer Paternal Grandmother        Social History     Socioeconomic History    Marital status: Single    Number of children: 0    Highest education level: Some college, no degree   Tobacco Use    Smoking status: Never     Passive exposure: Never    Smokeless tobacco: Never   Vaping Use    Vaping Use: Never used   Substance and Sexual Activity    Alcohol use: Not Currently    Drug use: Not Currently    Sexual activity: Yes     Partners: Male     Birth control/protection: None            Objective   Physical Exam  Vitals and nursing note reviewed.   Constitutional:       Appearance: Normal appearance. She is normal weight.   HENT:      Head: Normocephalic and atraumatic.      Nose: Nose normal.      Mouth/Throat:      Mouth: Mucous membranes are moist.   Eyes:      Pupils: Pupils are equal, round, and reactive to light.   Cardiovascular:      Rate and Rhythm: Regular rhythm. Tachycardia present.      Pulses: Normal pulses.      Heart sounds: Normal heart sounds.   Pulmonary:      Effort: Pulmonary effort is normal.      Breath sounds: Normal breath sounds.   Abdominal:      Comments: No abdominal tenderness   Musculoskeletal:         General: Normal range of motion.      Cervical back: Normal range of motion.      Right lower leg: No edema.      Left lower leg: No edema.   Skin:     General: Skin is warm.   Neurological:      General: No focal deficit present.      Mental Status: She is alert.   Psychiatric:         Behavior: Behavior is cooperative.         Procedures           ED Course  ED Course as of 10/20/23 1821   Fri Oct 20, 2023   1348 Leukocytes, UA(!): Trace [SS]   1500 I had a lengthy discussion with patient regarding her symptoms.  I advised that if she is worried about her heart rate to buy a pulse oximeter at a pharmacy.  I instructed that if her heart rate continues to be elevated and if her oxygen level drops below 92% to return to the ER.  Currently patient hemodynamically stable, I believe she safe to be discharged home at this time. [SS]   1501 I rechecked patient and patient's temperature has improved to 99.  Her heart rate has also significantly improved to 111.  Patient reports that when she had a vaginal delivery about 2.5 weeks ago her heart rate has been slightly elevated since then.  She was seen in the ER 11 days ago and ruled out PE. [SS]      ED Course User Index  [SS] Hanane Arita PA-C                                           Medical Decision  Making  Problems Addressed:  Fever, unspecified fever cause: complicated acute illness or injury  Upper respiratory tract infection, unspecified type: complicated acute illness or injury    Amount and/or Complexity of Data Reviewed  Labs:  Decision-making details documented in ED Course.  Radiology: ordered.    Risk  OTC drugs.  Prescription drug management.        Final diagnoses:   Fever, unspecified fever cause   Upper respiratory tract infection, unspecified type       ED Disposition  ED Disposition       ED Disposition   Discharge    Condition   Stable    Comment   --               Neva Ortiz, APRN  [address]  [place] [address]  [phone]               Medication List        Stop      nitrofurantoin (macrocrystal-monohydrate) 100 MG capsule  Commonly known as: Macrobid               Where to Get Your Medications        These medications were sent to [facility] [id number] - [facility] [address] AT [facility] & ([facility] - [phone] [facility] [phone] [address], [facility] [address]      Phone: [phone]   cephalexin 500 MG capsule

## 2023-10-20 NOTE — TELEPHONE ENCOUNTER
From: Mayra Garner  To: Yolanda Sanz  Sent: 10/19/2023 10:24 AM EDT  Subject: Vaginal Change after birth    Hi Dr. Sanz!     I know this is a semi random question- but is it normal for your Clitoris to be smaller after giving birth? it seems to be comparably smaller than pre pregnancy and during pregnancy as of now.

## 2023-10-20 NOTE — TELEPHONE ENCOUNTER
Patient called stating she is 2.5 weeks postpartum and experiencing a fever of 101.7, body aches/chills, chest discomfort with deep breaths, no sore throat.  Patient states she is breast feeding but denies any breast lumps or red streaks in either breast.  Patient was advised to go to Urgent Care and be tested for the Flu and COVID and to call us back if these results are negative so we could do further work-up.

## 2023-10-30 ENCOUNTER — TELEPHONE (OUTPATIENT)
Dept: OBSTETRICS AND GYNECOLOGY | Age: 23
End: 2023-10-30
Payer: COMMERCIAL

## 2023-10-30 RX ORDER — FLUCONAZOLE 150 MG/1
150 TABLET ORAL
Qty: 2 TABLET | Refills: 0 | Status: SHIPPED | OUTPATIENT
Start: 2023-10-30

## 2023-10-30 NOTE — TELEPHONE ENCOUNTER
Vaginal delivery 10/3/23 patient finished keflex last Friday experiencing yeast infection symptoms , she is breastfeeding . Patient wants to know if can use monistat since only 4 wks pp . Please advise ?

## 2023-11-01 ENCOUNTER — PATIENT MESSAGE (OUTPATIENT)
Dept: OBSTETRICS AND GYNECOLOGY | Age: 23
End: 2023-11-01
Payer: COMMERCIAL

## 2023-11-01 NOTE — TELEPHONE ENCOUNTER
From: Mayra Garner  To: Yolanda Sanz  Sent: 11/1/2023 3:01 PM EDT  Subject: Heart Rate Question     Hi, Dr Sanz!     One last question I promise. I was told to monitor my heart rate over the last couple weeks, and i’ve noticed that my resting heart rate is usually between 85-96 and will jump up to 115-120 when standing and then will settle back down. Should I be concerned with these heart rates? they’re higher than my pre pregnancy resting when I was active and from what i’ve seen is that it can take up to 3 months for your heart rate to go back to normal? just wanted to see if this is true or if i have any reason for concern at all. Thank you for all your help.

## 2023-11-14 ENCOUNTER — POSTPARTUM VISIT (OUTPATIENT)
Dept: OBSTETRICS AND GYNECOLOGY | Age: 23
End: 2023-11-14
Payer: COMMERCIAL

## 2023-11-14 VITALS
DIASTOLIC BLOOD PRESSURE: 60 MMHG | WEIGHT: 168.4 LBS | HEIGHT: 65 IN | BODY MASS INDEX: 28.06 KG/M2 | SYSTOLIC BLOOD PRESSURE: 116 MMHG

## 2023-11-14 DIAGNOSIS — N39.0 RECURRENT UTI: ICD-10-CM

## 2023-11-14 LAB
BILIRUB BLD-MCNC: NEGATIVE MG/DL
CLARITY, POC: CLEAR
COLOR UR: YELLOW
GLUCOSE UR STRIP-MCNC: NEGATIVE MG/DL
KETONES UR QL: NEGATIVE
LEUKOCYTE EST, POC: NEGATIVE
NITRITE UR-MCNC: NEGATIVE MG/ML
PH UR: 6.5 [PH] (ref 5–8)
PROT UR STRIP-MCNC: NEGATIVE MG/DL
RBC # UR STRIP: NEGATIVE /UL
SP GR UR: 1.01 (ref 1–1.03)
UROBILINOGEN UR QL: NORMAL

## 2023-11-14 RX ORDER — NORETHINDRONE ACETATE AND ETHINYL ESTRADIOL AND FERROUS FUMARATE 1MG-20(24)
1 KIT ORAL DAILY
Qty: 28 TABLET | Refills: 12 | Status: SHIPPED | OUTPATIENT
Start: 2023-11-14 | End: 2024-11-13

## 2023-11-14 NOTE — PROGRESS NOTES
"[facility]   Obstetrics and Gynecology   Postpartum Visit    2023    Patient: Mayra Garner          MR#:[id number]    History of Present Illness    Chief Complaint   Patient presents with    Postpartum Care     PP 6 wks  10/03/2023 male \"Ar\", Breast feeding, Labial lac repaired w/3-0 vicryl, No problems today       23 y.o. female  status post  10/3/23 presents for postpartum visit without complaints.  Mood stable.  Anxiety resolved.  Breast-feeding without issue.  Bleeding resolved.  Did have intercourse once this week and noticed some pain with initial insertion.     Contraception: FATMATA  Vaccines: RI, h/o varivax, s/p tdap  Pap: NIL 3/2/23  ________________________________________  Patient Active Problem List   Diagnosis    Congenital malformation of peripheral vascular system, unspecified     (normal spontaneous vaginal delivery)       Past Medical History:   Diagnosis Date    Trauma     10 years old    Vascular abnormality     Right Arm        Past Surgical History:   Procedure Laterality Date    OTHER SURGICAL HISTORY Right     Arm sclerotherapy x5       Social History     Tobacco Use   Smoking Status Never    Passive exposure: Never   Smokeless Tobacco Never       has a current medication list which includes the following prescription(s): acetaminophen, prenatal (classic) vitamin, ibuprofen, and norethindrone-ethinyl estradiol-ferrous fumarate.  ________________________________________         Review of Systems   All other systems reviewed and are negative.      Objective     /60   Ht 165.1 cm (65\")   Wt 76.4 kg (168 lb 6.4 oz)   Breastfeeding Yes   BMI 28.02 kg/m²    BP Readings from Last 3 Encounters:   23 116/60   10/20/23 138/92   10/12/23 124/70      Wt Readings from Last 3 Encounters:   23 76.4 kg (168 lb 6.4 oz)   10/20/23 74.8 kg (165 lb)   10/12/23 77.3 kg (170 lb 6.4 oz)      BMI: Estimated body mass index is 28.02 kg/m² as calculated " "from the following:    Height as of this encounter: 165.1 cm (65\").    Weight as of this encounter: 76.4 kg (168 lb 6.4 oz).    EXAM     General:     Patient appears well in NAD  Respiratory: Normal effort, no distress  Breast:  declined  Abdomen: Soft, NT, no acute findings  Pelvic:  perineum without signs of infection, uterus small and nontender  Ext:  No cyanosis or edema    Assessment:    Diagnoses and all orders for this visit:    1. Postpartum follow-up (Primary)    2. Recurrent UTI  -     POC Urinalysis Dipstick    Other orders  -     norethindrone-ethinyl estradiol-ferrous fumarate (LOESTIN 24 FE) 1-20 MG-MCG(24) per tablet; Take 1 tablet by mouth Daily.  Dispense: 28 tablet; Refill: 12    -Doing great postpartum, no acute issues  - Discussed all contraceptive options.  Patient would like FATMATA.  She has tolerated well in the past.  - Vagina well-healed.  Discussed that pain with initial insertion will likely improve with continued intercourse but estrogen deprivation may have a role while breast-feeding.  Discussed letting me know if she has continued problems and we could try estrogen cream or pelvic floor PT.  - Urinalysis normal, no acute symptoms, will not send for culture    Plan:  Return in about 1 year (around 11/14/2024) for Annual.    I spent 30 minutes caring for Mayra Garner on this date of service. This time includes time spent by me in the following activities: preparing for the visit, reviewing tests, obtaining and/or reviewing a separately obtained history, performing a medically appropriate examination and/or evaluation, counseling and educating the patient/family/caregiver, ordering medications, tests, or procedures and documenting information in the medical record.  This time does NOT include time spent on separately reported services.    Yolanda Sanz MD  11/14/2023 09:46 EST  "

## 2023-11-16 ENCOUNTER — PATIENT MESSAGE (OUTPATIENT)
Dept: OBSTETRICS AND GYNECOLOGY | Age: 23
End: 2023-11-16
Payer: COMMERCIAL

## 2023-11-17 NOTE — TELEPHONE ENCOUNTER
From: Mayra Garner  To: Yolanda Sanz  Sent: 11/16/2023 2:08 PM EST  Subject: Bleeding after starting Birth control     Hi, Dr Sanz.     I had started my birthcontrol on tuesday, and today I am crampy and bleeding like a period. Cramps are super mild, but the bleeding is like a light/medium period. enough I needed a panty liner but not heavy enough for a full pad. Is this normal after starting birth control? or is it possible i was going to start my period around this time anyways after having him and it just happened to be around the same time i started birth control?     Thanks for your help.

## 2023-11-29 RX ORDER — ACETAMINOPHEN AND CODEINE PHOSPHATE 120; 12 MG/5ML; MG/5ML
1 SOLUTION ORAL DAILY
Qty: 28 TABLET | Refills: 12 | Status: SHIPPED | OUTPATIENT
Start: 2023-11-29 | End: 2024-11-28

## 2023-12-18 ENCOUNTER — HOSPITAL ENCOUNTER (OUTPATIENT)
Facility: HOSPITAL | Age: 23
Discharge: HOME | End: 2023-12-18
Attending: EMERGENCY MEDICINE | Admitting: EMERGENCY MEDICINE
Payer: COMMERCIAL

## 2023-12-18 VITALS
RESPIRATION RATE: 16 BRPM | DIASTOLIC BLOOD PRESSURE: 85 MMHG | HEART RATE: 98 BPM | SYSTOLIC BLOOD PRESSURE: 129 MMHG | BODY MASS INDEX: 25.71 KG/M2 | TEMPERATURE: 97.9 F | WEIGHT: 160 LBS | HEIGHT: 66 IN | OXYGEN SATURATION: 97 %

## 2023-12-18 DIAGNOSIS — B33.8 RSV INFECTION: Primary | ICD-10-CM

## 2023-12-18 DIAGNOSIS — J01.00 ACUTE NON-RECURRENT MAXILLARY SINUSITIS: ICD-10-CM

## 2023-12-18 LAB
FLUAV SUBTYP SPEC NAA+PROBE: NOT DETECTED
FLUBV RNA ISLT QL NAA+PROBE: NOT DETECTED
RSV RNA NPH QL NAA+NON-PROBE: DETECTED
SARS-COV-2 RNA RESP QL NAA+PROBE: NOT DETECTED
STREP A PCR: NOT DETECTED

## 2023-12-18 PROCEDURE — 87634 RSV DNA/RNA AMP PROBE: CPT

## 2023-12-18 PROCEDURE — 87651 STREP A DNA AMP PROBE: CPT | Performed by: EMERGENCY MEDICINE

## 2023-12-18 PROCEDURE — G0463 HOSPITAL OUTPT CLINIC VISIT: HCPCS

## 2023-12-18 PROCEDURE — 87636 SARSCOV2 & INF A&B AMP PRB: CPT | Performed by: EMERGENCY MEDICINE

## 2023-12-18 RX ORDER — AMOXICILLIN AND CLAVULANATE POTASSIUM 875; 125 MG/1; MG/1
1 TABLET, FILM COATED ORAL 2 TIMES DAILY
Qty: 14 TABLET | Refills: 0 | Status: SHIPPED | OUTPATIENT
Start: 2023-12-18 | End: 2023-12-25

## 2023-12-18 RX ORDER — AMOXICILLIN AND CLAVULANATE POTASSIUM 875; 125 MG/1; MG/1
1 TABLET, FILM COATED ORAL ONCE
Status: COMPLETED | OUTPATIENT
Start: 2023-12-18 | End: 2023-12-18

## 2023-12-18 RX ADMIN — AMOXICILLIN AND CLAVULANATE POTASSIUM 1 TABLET: 875; 125 TABLET, FILM COATED ORAL at 10:39

## 2023-12-18 NOTE — FSED PROVIDER NOTE
Subjective   History of Present Illness  Patient is a 23-year-old female who presents for body aches and sinus pain x 3 days.  Reports pressure in her teeth, chills, cough, headache, congestion, runny nose.  Reports thick green productive nasal mucus.  Denies fever, shortness of breath, chest pain, abdominal pain, nausea, vomiting, diarrhea, constipation.  Patient reports general upper respiratory symptoms that have been coming and going over the past several weeks.  Patient has almost 3-month-old baby who recently had RSV infection x 3 weeks ago.  Patient reports she is currently getting up almost every hour at nighttime.  Patient is currently breast-feeding.        Review of Systems   Constitutional:  Positive for chills and fatigue. Negative for appetite change, diaphoresis and fever.   HENT:  Positive for congestion, dental problem, rhinorrhea, sinus pressure and sinus pain. Negative for ear discharge, ear pain, sore throat and trouble swallowing.    Eyes:  Negative for pain and redness.   Respiratory:  Positive for cough. Negative for shortness of breath.    Cardiovascular:  Negative for chest pain.   Gastrointestinal:  Negative for abdominal pain, constipation, diarrhea, nausea and vomiting.   Genitourinary:  Negative for difficulty urinating and dysuria.   Musculoskeletal:  Positive for myalgias.   Skin:  Negative for color change, pallor, rash and wound.   Neurological:  Positive for headaches. Negative for seizures and syncope.       Past Medical History:   Diagnosis Date    Trauma     10 years old    Vascular abnormality     Right Arm        Allergies   Allergen Reactions    Iodine Anaphylaxis    Shellfish-Derived Products Anaphylaxis       Past Surgical History:   Procedure Laterality Date    OTHER SURGICAL HISTORY Right     Arm sclerotherapy x5       Family History   Adopted: Yes   Problem Relation Age of Onset    Breast cancer Paternal Grandmother        Social History     Socioeconomic History     Marital status: Single    Number of children: 0    Highest education level: Some college, no degree   Tobacco Use    Smoking status: Never     Passive exposure: Never    Smokeless tobacco: Never   Vaping Use    Vaping Use: Never used   Substance and Sexual Activity    Alcohol use: Not Currently    Drug use: Not Currently    Sexual activity: Yes     Partners: Male     Birth control/protection: None           Objective   Physical Exam  Constitutional:       General: She is not in acute distress.     Appearance: She is normal weight. She is not toxic-appearing.   HENT:      Head: Normocephalic and atraumatic.      Right Ear: Tympanic membrane, ear canal and external ear normal.      Left Ear: Tympanic membrane and ear canal normal.      Mouth/Throat:      Mouth: Mucous membranes are moist.      Pharynx: Oropharynx is clear. Posterior oropharyngeal erythema present. No oropharyngeal exudate.      Comments: No tonsillitis.  Midline uvula without swelling.  Cardiovascular:      Rate and Rhythm: Normal rate and regular rhythm.   Pulmonary:      Effort: Pulmonary effort is normal. No respiratory distress.      Breath sounds: Normal breath sounds. No stridor. No wheezing, rhonchi or rales.   Chest:      Chest wall: No tenderness.   Musculoskeletal:         General: Normal range of motion.      Cervical back: Normal range of motion.   Skin:     General: Skin is warm and dry.      Findings: No rash.   Neurological:      General: No focal deficit present.      Mental Status: She is alert.   Psychiatric:         Mood and Affect: Mood normal.         Behavior: Behavior normal.         Procedures           ED Course                                           Medical Decision Making  Patient is a 23-year-old female recently postpartum mom who presents for ill symptoms.  Body aches, teeth and sinus pressure, headache, cough, congestion.  Her symptoms have been on and off for the past couple weeks, worse in the past few days.  Patient  swabs come back positive for RSV.  Her son had RSV several weeks ago.  He is currently asymptomatic, but not sleeping well.  Patient is fatigued.  Overall, patient appears tired but well.  Vital signs are WNL.  Lung sounds are clear.  Oropharynx and otic exams are unremarkable.  Patient most likely sustaining a sinusitis infection as well due to the longevity of her symptoms.  Will prescribe Augmentin.  Safe for breast-feeding.  Steroid therapy not recommended for breast-feeding.  Recommended increased fluids, Tylenol and ibuprofen.   Discussed when to return to the emergency department.  Answered all questions.  Patient verbalized understanding and was agreeable to plan and discharge.    My differential diagnosis includes but is not limited to dehydration, fever, gastroenteritis, asthma, reactive airway disease, bronchitis, bronchiolitis, RSV, croup, gastroenteritis, enteritis, hypoxia, ingestion, meningitis, otitis media, strep pharyngitis, mono, viral pharyngitis, pneumonia, sepsis, sinusitis, upper respiratory tract infection, urinary tract infection, viral syndrome, influenza, and viral rashes     Problems Addressed:  Acute non-recurrent maxillary sinusitis: complicated acute illness or injury  RSV infection: complicated acute illness or injury    Amount and/or Complexity of Data Reviewed  Labs: ordered.    Risk  Prescription drug management.        Final diagnoses:   RSV infection   Acute non-recurrent maxillary sinusitis       ED Disposition  ED Disposition       ED Disposition   Discharge    Condition   Stable    Comment   --               Neva Ortiz, APRN  [address] [id number] [phone]    Schedule an appointment as soon as possible for a visit            Medication List        New Prescriptions      amoxicillin-clavulanate 875-125 MG per tablet  Commonly known as: AUGMENTIN  Take 1 tablet by mouth 2 (Two) Times a Day for 7 days.               Where to Get Your Medications         These medications were sent to [address] [address] [facility] - [place], [place] - [address] [facility] [address] - [phone]  - [phone]   [address] [address], [facility] [address]      Phone: [phone]   amoxicillin-clavulanate 875-125 MG per tablet

## 2023-12-18 NOTE — DISCHARGE INSTRUCTIONS
You tested positive for RSV today.  You also have a sinus infection.  First dose of your antibiotic given in the ER today.  Next dose due tonight.  Take the prescribed antibiotic medicine you are given as directed until it is gone. Take it even if you feel better. It treats the infection and stops it from returning. Not taking all the medicine can make future infections hard to treat.  Increase electrolyte fluids and small bland meals.  Recommend alternating Tylenol and ibuprofen every 4 hours.  Return to emergency department for worsening symptoms or other medical emergencies.  Recommended follow-up with PCP.  Refer to the attached instructions for further information.

## 2024-02-02 ENCOUNTER — HOSPITAL ENCOUNTER (OUTPATIENT)
Facility: HOSPITAL | Age: 24
Discharge: HOME | End: 2024-02-02
Attending: EMERGENCY MEDICINE | Admitting: EMERGENCY MEDICINE
Payer: COMMERCIAL

## 2024-02-02 VITALS
OXYGEN SATURATION: 100 % | HEART RATE: 100 BPM | SYSTOLIC BLOOD PRESSURE: 148 MMHG | TEMPERATURE: 98.5 F | BODY MASS INDEX: 25.71 KG/M2 | DIASTOLIC BLOOD PRESSURE: 78 MMHG | HEIGHT: 66 IN | WEIGHT: 160 LBS | RESPIRATION RATE: 16 BRPM

## 2024-02-02 DIAGNOSIS — J10.1 INFLUENZA B: Primary | ICD-10-CM

## 2024-02-02 LAB
FLUAV SUBTYP SPEC NAA+PROBE: NOT DETECTED
FLUBV RNA ISLT QL NAA+PROBE: DETECTED
SARS-COV-2 RNA RESP QL NAA+PROBE: NOT DETECTED

## 2024-02-02 PROCEDURE — 99213 OFFICE O/P EST LOW 20 MIN: CPT | Performed by: PHYSICIAN ASSISTANT

## 2024-02-02 PROCEDURE — G0463 HOSPITAL OUTPT CLINIC VISIT: HCPCS | Performed by: PHYSICIAN ASSISTANT

## 2024-02-02 PROCEDURE — 87636 SARSCOV2 & INF A&B AMP PRB: CPT | Performed by: EMERGENCY MEDICINE

## 2024-02-02 NOTE — FSED PROVIDER NOTE
Subjective   History of Present Illness  Patient is a healthy 23-year-old who is breast-feeding.  She presents emergency room with URI symptoms since last night.  She is having body aches, headache, congestion.  She had fever today.  She has been exposed to flu.      Review of Systems   Constitutional:  Positive for fever.   HENT:  Positive for congestion.    Musculoskeletal:  Positive for myalgias.   Skin:  Negative for rash.   Neurological:  Positive for headaches.   All other systems reviewed and are negative.      Past Medical History:   Diagnosis Date    Trauma     10 years old    Vascular abnormality     Right Arm        Allergies   Allergen Reactions    Iodine Anaphylaxis    Shellfish-Derived Products Anaphylaxis       Past Surgical History:   Procedure Laterality Date    OTHER SURGICAL HISTORY Right     Arm sclerotherapy x5       Family History   Adopted: Yes   Problem Relation Age of Onset    Breast cancer Paternal Grandmother        Social History     Socioeconomic History    Marital status: Single    Number of children: 0    Highest education level: Some college, no degree   Tobacco Use    Smoking status: Never     Passive exposure: Never    Smokeless tobacco: Never   Vaping Use    Vaping Use: Never used   Substance and Sexual Activity    Alcohol use: Not Currently    Drug use: Not Currently    Sexual activity: Yes     Partners: Male     Birth control/protection: None           Objective   Physical Exam  Vitals reviewed.   Constitutional:       Appearance: Normal appearance. She is normal weight.   HENT:      Right Ear: Tympanic membrane normal.      Left Ear: Tympanic membrane normal.      Mouth/Throat:      Mouth: Mucous membranes are dry.      Pharynx: No oropharyngeal exudate or posterior oropharyngeal erythema.   Eyes:      Conjunctiva/sclera: Conjunctivae normal.   Cardiovascular:      Rate and Rhythm: Normal rate.   Pulmonary:      Effort: Pulmonary effort is normal.   Abdominal:      Tenderness:  There is no right CVA tenderness or left CVA tenderness.   Musculoskeletal:         General: Normal range of motion.      Cervical back: Normal range of motion.   Skin:     General: Skin is warm and dry.   Neurological:      Mental Status: She is alert.      Gait: Gait normal.   Psychiatric:         Mood and Affect: Mood normal.         Behavior: Behavior normal.         Procedures           ED Course                                           Medical Decision Making  Patient is positive for flu a and negative for COVID.  She is nursing and we talked about medications but she says she will stick to Tylenol.  Return to the emergency room if she has any concerns.  Oxygen had a percent, well-appearing and medically cleared.    Problems Addressed:  Influenza B: acute illness or injury        Final diagnoses:   Influenza B       ED Disposition  ED Disposition       ED Disposition   Discharge    Condition   Stable    Comment   --               Neva Ortiz, APRN  [address] [id number] [phone]    In 1 week           Medication List      No changes were made to your prescriptions during this visit.

## 2024-03-06 ENCOUNTER — TELEPHONE (OUTPATIENT)
Dept: OBSTETRICS AND GYNECOLOGY | Age: 24
End: 2024-03-06
Payer: COMMERCIAL

## 2024-03-06 NOTE — TELEPHONE ENCOUNTER
Pt calling with c/o irregular periods since baby born 5 months ago.Pt is breast feeing and taking Micronor. Pt states LMP early January, but neg HPT

## 2024-03-06 NOTE — TELEPHONE ENCOUNTER
Breast-feeding can interfere with menses.  If patient is sexually active, I recommend continuing Micronor.  When she has completed breast-feeding, monitor menses for 3 to 4 months.  If still irregular at that point, she should make an appointment to come see me.

## 2024-03-07 ENCOUNTER — TELEPHONE (OUTPATIENT)
Dept: OBSTETRICS AND GYNECOLOGY | Age: 24
End: 2024-03-07
Payer: COMMERCIAL

## 2024-04-15 ENCOUNTER — OFFICE VISIT (OUTPATIENT)
Dept: FAMILY MEDICINE CLINIC | Facility: CLINIC | Age: 24
End: 2024-04-15
Payer: COMMERCIAL

## 2024-04-15 VITALS
BODY MASS INDEX: 27.16 KG/M2 | HEIGHT: 66 IN | WEIGHT: 169 LBS | RESPIRATION RATE: 18 BRPM | SYSTOLIC BLOOD PRESSURE: 122 MMHG | OXYGEN SATURATION: 98 % | HEART RATE: 73 BPM | DIASTOLIC BLOOD PRESSURE: 86 MMHG

## 2024-04-15 DIAGNOSIS — R53.82 CHRONIC FATIGUE: ICD-10-CM

## 2024-04-15 DIAGNOSIS — F43.22 ADJUSTMENT DISORDER WITH ANXIETY: ICD-10-CM

## 2024-04-15 DIAGNOSIS — K03.1: Primary | ICD-10-CM

## 2024-04-15 PROCEDURE — 1159F MED LIST DOCD IN RCRD: CPT | Performed by: NURSE PRACTITIONER

## 2024-04-15 PROCEDURE — 99214 OFFICE O/P EST MOD 30 MIN: CPT | Performed by: NURSE PRACTITIONER

## 2024-04-15 PROCEDURE — 1160F RVW MEDS BY RX/DR IN RCRD: CPT | Performed by: NURSE PRACTITIONER

## 2024-04-15 RX ORDER — SERTRALINE HYDROCHLORIDE 25 MG/1
25 TABLET, FILM COATED ORAL DAILY
Qty: 30 TABLET | Refills: 0 | Status: SHIPPED | OUTPATIENT
Start: 2024-04-15 | End: 2024-05-15

## 2024-04-15 NOTE — PROGRESS NOTES
Subjective   Mayra Garner is a 23 y.o. female.     History of Present Illness   Estab pt here for follow up, worsening anxiety and teeth changes. She has had baby recently. First baby, he is 6 month old. Dentist noted enamel on teeth is thinning  and they recommended labs. She is still taking prenatals. Some chronic fatigue. No diet changes. Not eat hard crunchy things that could be damaging teeth.    Acute anxiety and near panic attacks since having baby. She reports history of PTSD and has not seen therapist since high school. She is waking with racing heart and jumping out of bed. She was prev on lexapro in the past. She does have OCD tendencies. She denies SI/HI. She reports baby is sleeping mostly through the night. She reports history of medical anxiety. PHQ-2 Depression Screening  Little interest or pleasure in doing things? 0-->not at all   Feeling down, depressed, or hopeless? 0-->not at all   PHQ-2 Total Score 0   WILBER score : 15.       The following portions of the patient's history were reviewed and updated as appropriate: allergies, current medications, past family history, past medical history, past social history, past surgical history and problem list.    Review of Systems      Current Outpatient Medications:     prenatal vitamin (prenatal, CLASSIC, vitamin) tablet, Take  by mouth Daily., Disp: , Rfl:     Acetaminophen (TYLENOL PO), Take  by mouth. (Patient not taking: Reported on 4/15/2024), Disp: , Rfl:     Estradiol-Progesterone 1-100 MG capsule, Take 1 capsule by mouth Daily. (Patient not taking: Reported on 4/15/2024), Disp: , Rfl:     ibuprofen (ADVIL,MOTRIN) 600 MG tablet, Take 1 tablet by mouth Every 6 (Six) Hours As Needed for Mild Pain (First Line: Mild pain.). (Patient not taking: Reported on 10/9/2023), Disp: 18 tablet, Rfl: 0    norethindrone (MICRONOR) 0.35 MG tablet, Take 1 tablet by mouth Daily. (Patient not taking: Reported on 4/15/2024), Disp: 28 tablet, Rfl: 12    sertraline  (Zoloft) 25 MG tablet, Take 1 tablet by mouth Daily for 30 days., Disp: 30 tablet, Rfl: 0    Objective   Physical Exam  Vitals reviewed.   Constitutional:       Appearance: Normal appearance.   HENT:      Mouth/Throat:      Mouth: Mucous membranes are moist.        Comments: Chipping/thinning of bottom of front 2 teeth  Cardiovascular:      Rate and Rhythm: Normal rate.      Pulses: Normal pulses.      Heart sounds: Normal heart sounds.   Pulmonary:      Effort: Pulmonary effort is normal.      Breath sounds: Normal breath sounds.   Abdominal:      General: Bowel sounds are normal.   Skin:     General: Skin is warm.   Neurological:      General: No focal deficit present.      Mental Status: She is alert.   Psychiatric:         Mood and Affect: Mood normal.         Vitals:    04/15/24 1405   BP: 122/86   Pulse: 73   Resp: 18   SpO2: 98%     Body mass index is 27.28 kg/m².    Procedures    TSH   Date Value Ref Range Status   01/28/2022 1.160 0.450 - 4.500 uIU/mL Final     CBC   Date Value Ref Range Status   01/17/2022 0.50 0.4 - 1.0 10*3/uL Final         Assessment & Plan   Problems Addressed this Visit    None  Visit Diagnoses       Abrasion of teeth, enamel    -  Primary    Relevant Orders    CBC & Differential    Comprehensive Metabolic Panel    TSH    T4, Free    Iron    Vitamin B12 and Folate    Ferritin    Vitamin D 25 hydroxy    Adjustment disorder with anxiety        Relevant Medications    sertraline (Zoloft) 25 MG tablet    Other Relevant Orders    CBC & Differential    Comprehensive Metabolic Panel    TSH    T4, Free    Iron    Vitamin B12 and Folate    Ferritin    Vitamin D 25 hydroxy    Chronic fatigue        Relevant Orders    TSH    T4, Free    Iron    Vitamin B12 and Folate    Ferritin    Vitamin D 25 hydroxy          Diagnoses         Codes Comments    Abrasion of teeth, enamel    -  Primary ICD-10-CM: K03.1  ICD-9-CM: 521.21     Adjustment disorder with anxiety     ICD-10-CM: F43.22  ICD-9-CM: 309.24      Chronic fatigue     ICD-10-CM: R53.82  ICD-9-CM: 780.79         Teeth changes- see dentist, check labs, no hard crunchy foods/ice/candy, brush w soft bristle and use sensodyne.     Anxiety/fatigue- check labs, reviewed risk vs benefit meds, start low dose sertraline 25 mg qd, enc therapy resources given. Reviewed med s.e profile , risk of suicidally, if worsening stop meds, call [phone] go to ER. Enc healthy diet, focus on protein/water, rest, prioritize sleep          Education provided in AVS   Return in about 4 weeks (around 5/13/2024) for Recheck.

## 2024-04-16 LAB
25(OH)D3+25(OH)D2 SERPL-MCNC: 23.5 NG/ML (ref 30–100)
ALBUMIN SERPL-MCNC: 4.4 G/DL (ref 4–5)
ALBUMIN/GLOB SERPL: 1.7 {RATIO} (ref 1.2–2.2)
ALP SERPL-CCNC: 92 IU/L (ref 44–121)
ALT SERPL-CCNC: 15 IU/L (ref 0–32)
AST SERPL-CCNC: 20 IU/L (ref 0–40)
BASOPHILS # BLD AUTO: 0 X10E3/UL (ref 0–0.2)
BASOPHILS NFR BLD AUTO: 0 %
BILIRUB SERPL-MCNC: 0.4 MG/DL (ref 0–1.2)
BUN SERPL-MCNC: 14 MG/DL (ref 6–20)
BUN/CREAT SERPL: 22 (ref 9–23)
CALCIUM SERPL-MCNC: 9.4 MG/DL (ref 8.7–10.2)
CHLORIDE SERPL-SCNC: 105 MMOL/L (ref 96–106)
CO2 SERPL-SCNC: 23 MMOL/L (ref 20–29)
CREAT SERPL-MCNC: 0.65 MG/DL (ref 0.57–1)
EGFRCR SERPLBLD CKD-EPI 2021: 128 ML/MIN/1.73
EOSINOPHIL # BLD AUTO: 0.1 X10E3/UL (ref 0–0.4)
EOSINOPHIL NFR BLD AUTO: 1 %
ERYTHROCYTE [DISTWIDTH] IN BLOOD BY AUTOMATED COUNT: 12.7 % (ref 11.7–15.4)
FERRITIN SERPL-MCNC: 74 NG/ML (ref 15–150)
FOLATE SERPL-MCNC: 11.5 NG/ML
GLOBULIN SER CALC-MCNC: 2.6 G/DL (ref 1.5–4.5)
GLUCOSE SERPL-MCNC: 83 MG/DL (ref 70–99)
HCT VFR BLD AUTO: 39.7 % (ref 34–46.6)
HGB BLD-MCNC: 13 G/DL (ref 11.1–15.9)
IMM GRANULOCYTES # BLD AUTO: 0 X10E3/UL (ref 0–0.1)
IMM GRANULOCYTES NFR BLD AUTO: 0 %
IRON SERPL-MCNC: 47 UG/DL (ref 27–159)
LYMPHOCYTES # BLD AUTO: 2.4 X10E3/UL (ref 0.7–3.1)
LYMPHOCYTES NFR BLD AUTO: 37 %
MCH RBC QN AUTO: 28.9 PG (ref 26.6–33)
MCHC RBC AUTO-ENTMCNC: 32.7 G/DL (ref 31.5–35.7)
MCV RBC AUTO: 88 FL (ref 79–97)
MONOCYTES # BLD AUTO: 0.4 X10E3/UL (ref 0.1–0.9)
MONOCYTES NFR BLD AUTO: 6 %
NEUTROPHILS # BLD AUTO: 3.6 X10E3/UL (ref 1.4–7)
NEUTROPHILS NFR BLD AUTO: 56 %
PLATELET # BLD AUTO: 303 X10E3/UL (ref 150–450)
POTASSIUM SERPL-SCNC: 4.1 MMOL/L (ref 3.5–5.2)
PROT SERPL-MCNC: 7 G/DL (ref 6–8.5)
RBC # BLD AUTO: 4.5 X10E6/UL (ref 3.77–5.28)
SODIUM SERPL-SCNC: 147 MMOL/L (ref 134–144)
T4 FREE SERPL-MCNC: 1.4 NG/DL (ref 0.82–1.77)
TSH SERPL DL<=0.005 MIU/L-ACNC: 1.47 UIU/ML (ref 0.45–4.5)
VIT B12 SERPL-MCNC: 561 PG/ML (ref 232–1245)
WBC # BLD AUTO: 6.5 X10E3/UL (ref 3.4–10.8)

## 2024-05-15 ENCOUNTER — TELEMEDICINE (OUTPATIENT)
Dept: FAMILY MEDICINE CLINIC | Facility: CLINIC | Age: 24
End: 2024-05-15
Payer: COMMERCIAL

## 2024-05-15 DIAGNOSIS — E55.9 VITAMIN D DEFICIENCY DISEASE: ICD-10-CM

## 2024-05-15 DIAGNOSIS — F43.22 ADJUSTMENT DISORDER WITH ANXIETY: Primary | ICD-10-CM

## 2024-05-15 PROCEDURE — 1159F MED LIST DOCD IN RCRD: CPT | Performed by: NURSE PRACTITIONER

## 2024-05-15 PROCEDURE — 1160F RVW MEDS BY RX/DR IN RCRD: CPT | Performed by: NURSE PRACTITIONER

## 2024-05-15 PROCEDURE — 99213 OFFICE O/P EST LOW 20 MIN: CPT | Performed by: NURSE PRACTITIONER

## 2024-05-15 RX ORDER — SERTRALINE HYDROCHLORIDE 25 MG/1
25 TABLET, FILM COATED ORAL DAILY
Qty: 90 TABLET | Refills: 0 | Status: SHIPPED | OUTPATIENT
Start: 2024-05-15 | End: 2024-06-14

## 2024-05-15 NOTE — PROGRESS NOTES
Subjective   Mayra Garner is a 23 y.o. female.   This was an audio and video enabled telemedicine encounter.   History of Present Illness   Estab pt here for follow up    Anxiety since birth of son. Started sertraline 25 mg 4/15/24. She is feeling better since starting. Denies SI/HI. She is starting job and will be working 3 days a week. Ar with be sister or Mother in law. She is getting good stretches of sleep. She has started back at the gym. She is breastfeeding. Diet is great and she is eating well and eating a lot of protein. She is taking vitamin D for vit D deficiency (vit D 23).   The following portions of the patient's history were reviewed and updated as appropriate: allergies, current medications, past family history, past medical history, past social history, past surgical history and problem list.    Review of Systems   Respiratory:  Negative for cough.    Cardiovascular:  Negative for chest pain.   Musculoskeletal:  Negative for arthralgias.   Neurological:  Negative for syncope and headache.   Psychiatric/Behavioral:  Positive for stress. Negative for sleep disturbance and depressed mood. The patient is nervous/anxious.        Objective   Physical Exam  Constitutional:       Appearance: Normal appearance.   Pulmonary:      Effort: Pulmonary effort is normal.   Skin:     General: Skin is dry.   Neurological:      General: No focal deficit present.   Psychiatric:         Mood and Affect: Mood normal.         There were no vitals filed for this visit.  There is no height or weight on file to calculate BMI.    Procedures    Assessment & Plan   Problems Addressed this Visit    None  Visit Diagnoses       Adjustment disorder with anxiety    -  Primary    Relevant Medications    sertraline (Zoloft) 25 MG tablet    Vitamin D deficiency disease              Diagnoses         Codes Comments    Adjustment disorder with anxiety    -  Primary ICD-10-CM: F43.22  ICD-9-CM: 309.24     Vitamin D deficiency  disease     ICD-10-CM: E55.9  ICD-9-CM: 268.9           Anxiety- cw sertraline 25 mg qd, call if concerns or worsening, do not stop drug abruptly  Vit D def- cw vitamin D def       You have chosen to receive care through a telehealth visit. Do you consent to use a telephone visit for your medical care today? Yes    Education provided in AVS   Return in about 6 months (around 11/15/2024) for Annual physical.

## 2024-07-29 ENCOUNTER — OFFICE VISIT (OUTPATIENT)
Dept: FAMILY MEDICINE CLINIC | Facility: CLINIC | Age: 24
End: 2024-07-29
Payer: COMMERCIAL

## 2024-07-29 VITALS
HEIGHT: 66 IN | OXYGEN SATURATION: 97 % | HEART RATE: 90 BPM | WEIGHT: 161 LBS | DIASTOLIC BLOOD PRESSURE: 82 MMHG | BODY MASS INDEX: 25.88 KG/M2 | SYSTOLIC BLOOD PRESSURE: 102 MMHG | RESPIRATION RATE: 18 BRPM

## 2024-07-29 DIAGNOSIS — F41.9 ANXIETY: ICD-10-CM

## 2024-07-29 DIAGNOSIS — M25.511 ACUTE PAIN OF RIGHT SHOULDER: Primary | ICD-10-CM

## 2024-07-29 PROCEDURE — 1125F AMNT PAIN NOTED PAIN PRSNT: CPT | Performed by: NURSE PRACTITIONER

## 2024-07-29 PROCEDURE — 1159F MED LIST DOCD IN RCRD: CPT | Performed by: NURSE PRACTITIONER

## 2024-07-29 PROCEDURE — 99214 OFFICE O/P EST MOD 30 MIN: CPT | Performed by: NURSE PRACTITIONER

## 2024-07-29 PROCEDURE — 1160F RVW MEDS BY RX/DR IN RCRD: CPT | Performed by: NURSE PRACTITIONER

## 2024-07-29 RX ORDER — DICLOFENAC SODIUM 75 MG/1
75 TABLET, DELAYED RELEASE ORAL 2 TIMES DAILY PRN
Qty: 20 TABLET | Refills: 0 | Status: SHIPPED | OUTPATIENT
Start: 2024-07-29 | End: 2024-08-08

## 2024-07-29 RX ORDER — DICLOFENAC SODIUM 75 MG/1
75 TABLET, DELAYED RELEASE ORAL
COMMUNITY
Start: 2024-07-26 | End: 2024-07-29 | Stop reason: SDUPTHER

## 2024-07-29 NOTE — PROGRESS NOTES
Subjective   Mayra Garner is a 24 y.o. female.     History of Present Illness   Estab pt acute visit     Two weeks of Right shoulder pain, acute worsening. Trouble lifting arm above head or reaching behind. She works at [employer] and has some difficulty with holds and restraining and lifting kids. She went to ER 7/26 and hoped to get MRI. Has not had xray. She is right handed and having trouble with lifting or moving arm. She has 10 month old infant. She did not take prev prescribed diclofenac.     Chronic anxiety x years . On sertraline 25 mg qd. Prev diagnosed with anxiety with OCD tendencies. Would like to try higher dosage. Denies SI/HI. More difficult with being new mom and working.       The following portions of the patient's history were reviewed and updated as appropriate: allergies, current medications, past family history, past medical history, past social history, past surgical history and problem list.    Review of Systems      Current Outpatient Medications:     diclofenac (VOLTAREN) 75 MG EC tablet, Take 1 tablet by mouth 2 (Two) Times a Day As Needed (shoulder pain) for up to 10 days. Take w food, Disp: 20 tablet, Rfl: 0    sertraline (Zoloft) 50 MG tablet, Take 1 tablet by mouth Daily for 30 days., Disp: 90 tablet, Rfl: 3    prenatal vitamin (prenatal, CLASSIC, vitamin) tablet, Take  by mouth Daily., Disp: , Rfl:     Objective   Physical Exam  Vitals reviewed.   Constitutional:       Appearance: Normal appearance.   HENT:      Mouth/Throat:      Mouth: Mucous membranes are moist.   Cardiovascular:      Rate and Rhythm: Normal rate and regular rhythm.      Pulses: Normal pulses.      Heart sounds: Normal heart sounds.   Pulmonary:      Effort: Pulmonary effort is normal.      Breath sounds: Normal breath sounds.   Musculoskeletal:         General: Tenderness and signs of injury present. No deformity.      Right shoulder: Tenderness present. Decreased range of motion. Decreased strength.  Normal pulse.      Comments: Difficultly raising arm above shoulder, movement behind back    Skin:     General: Skin is warm.   Neurological:      General: No focal deficit present.      Mental Status: She is alert.   Psychiatric:         Mood and Affect: Mood is anxious.         Vitals:    07/29/24 1404   BP: 102/82   Pulse: 90   Resp: 18   SpO2: 97%     Body mass index is 25.99 kg/m².    Procedures    TSH   Date Value Ref Range Status   04/15/2024 1.470 0.450 - 4.500 uIU/mL Final     CBC   Date Value Ref Range Status   01/17/2022 0.50 0.4 - 1.0 10*3/uL Final                   Assessment & Plan   Problems Addressed this Visit    None  Visit Diagnoses       Acute pain of right shoulder    -  Primary    Relevant Orders    Ambulatory Referral to Orthopedic Surgery    Anxiety              Diagnoses         Codes Comments    Acute pain of right shoulder    -  Primary ICD-10-CM: M25.511  ICD-9-CM: 719.41     Anxiety     ICD-10-CM: F41.9  ICD-9-CM: 300.00           Orders Placed This Encounter   Procedures    Ambulatory Referral to Orthopedic Surgery     Referral Priority:   Urgent     Referral Type:   Consultation     Referral Reason:   Specialty Services Required     Referred to Provider:   Key Kemp MD     Requested Specialty:   Orthopedic Surgery     Number of Visits Requested:   1     Shoulder pain- refer otho for eval and xray, advise ice, rest, heat, gentel ROM, rx diclofenac 75 mg qd (creat 0.65, ), take tylenol as needed, can add lidocaine patches    Anxiety- increase sertraline 50 mg qd, reviewed side effect profile, pt to stop med if worsening s/e or call [phone] or ER if suicidality, otherwise, take daily and touch base w PCP in 2 weeks to check in         Education provided in AVS   No follow-ups on file.

## 2024-08-02 ENCOUNTER — OFFICE VISIT (OUTPATIENT)
Dept: ORTHOPEDIC SURGERY | Facility: CLINIC | Age: 24
End: 2024-08-02
Payer: COMMERCIAL

## 2024-08-02 VITALS — HEIGHT: 66 IN | BODY MASS INDEX: 26.08 KG/M2 | WEIGHT: 162.3 LBS | TEMPERATURE: 98.6 F

## 2024-08-02 DIAGNOSIS — M75.41 IMPINGEMENT SYNDROME OF RIGHT SHOULDER: ICD-10-CM

## 2024-08-02 DIAGNOSIS — M25.511 RIGHT SHOULDER PAIN, UNSPECIFIED CHRONICITY: Primary | ICD-10-CM

## 2024-08-02 RX ORDER — METHYLPREDNISOLONE ACETATE 80 MG/ML
80 INJECTION, SUSPENSION INTRA-ARTICULAR; INTRALESIONAL; INTRAMUSCULAR; SOFT TISSUE
Status: COMPLETED | OUTPATIENT
Start: 2024-08-02 | End: 2024-08-02

## 2024-08-02 RX ORDER — LIDOCAINE HYDROCHLORIDE 10 MG/ML
2 INJECTION, SOLUTION EPIDURAL; INFILTRATION; INTRACAUDAL; PERINEURAL
Status: COMPLETED | OUTPATIENT
Start: 2024-08-02 | End: 2024-08-02

## 2024-08-02 RX ADMIN — METHYLPREDNISOLONE ACETATE 80 MG: 80 INJECTION, SUSPENSION INTRA-ARTICULAR; INTRALESIONAL; INTRAMUSCULAR; SOFT TISSUE at 14:02

## 2024-08-02 RX ADMIN — LIDOCAINE HYDROCHLORIDE 2 ML: 10 INJECTION, SOLUTION EPIDURAL; INFILTRATION; INTRACAUDAL; PERINEURAL at 14:02

## 2024-08-02 NOTE — PROGRESS NOTES
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  I told her that is not uncommon with shoulder injuries particularly when a lot of adrenaline is going.  Her pain has been significant with activity limitations.  She has been unable to work in a safe fashion her past medical history as listed below she has a 10-month-old and is breast-feeding      Allergies:   Allergies   Allergen Reactions    Iodine Anaphylaxis    Shellfish-Derived Products Anaphylaxis       Medications:   Home Medications:  Current Outpatient Medications on File Prior to Visit   Medication Sig    prenatal vitamin (prenatal, CLASSIC, vitamin) tablet Take  by mouth Daily.    sertraline (Zoloft) 50 MG tablet Take 1 tablet by mouth Daily for 30 days.    diclofenac (VOLTAREN) 75 MG EC tablet Take 1 tablet by mouth 2 (Two) Times a Day As Needed (shoulder pain) for up to 10 days. Take w food (Patient not taking: Reported on 8/2/2024)     No current facility-administered medications on file prior to visit.     Current Medications:  Scheduled Meds:  Continuous Infusions:No current facility-administered medications for this visit.    PRN Meds:.    Past Medical History:   Diagnosis Date    Trauma     10 years old    Vascular abnormality     Right Arm         Past Surgical History:   Procedure Laterality Date     "OTHER SURGICAL HISTORY Right     Arm sclerotherapy x5        Social History     Occupational History    Occupation: real estate   Tobacco Use    Smoking status: Never     Passive exposure: Never    Smokeless tobacco: Never   Vaping Use    Vaping status: Never Used   Substance and Sexual Activity    Alcohol use: Not Currently    Drug use: Not Currently    Sexual activity: Yes     Partners: Male     Birth control/protection: None      Social History     Social History Narrative    Not on file        Family History   Adopted: Yes   Problem Relation Age of Onset    Breast cancer Paternal Grandmother              Review of Systems:     Review of Systems      Physical Exam: 24 y.o. female  General Appearance:    Alert, cooperative, in no acute distress                   Vitals:    08/02/24 1329   Temp: 98.6 °F (37 °C)   Weight: 73.6 kg (162 lb 4.8 oz)   Height: 167.6 cm (66\")   PainSc: 10-Worst pain ever      Patient is alert and read ×3 no acute distress appears her above-listed at height weight and age.  Affect is normal respiratory rate is normal unlabored. Heart rate regular rate rhythm, sclera, dentition and hearing are normal for the purpose of this exam.    Ortho Exam  Exam of the right shoulder she can active flex to 180 she does struggle between 150 and 180 abduction similar rotator cuff strength 4+/5 with pain she does have pain with stressing of her biceps anchor  Large Joint Arthrocentesis: R subacromial bursa  Date/Time: 8/2/2024 2:02 PM  Consent given by: patient  Site marked: site marked  Timeout: Immediately prior to procedure a time out was called to verify the correct patient, procedure, equipment, support staff and site/side marked as required   Supporting Documentation  Indications: pain   Procedure Details  Location: shoulder - R subacromial bursa  Preparation: Patient was prepped and draped in the usual sterile fashion  Needle gauge: 21G.  Approach: posterior  Medications administered: 80 mg " methylPREDNISolone acetate 80 MG/ML; 2 mL lidocaine PF 1% 1 %  Patient tolerance: patient tolerated the procedure well with no immediate complications              Radiology:   AP, Scapular Y and Axillary Lateral of the right shoulder were ordered/reviewed to evauate shoulder pain.  I have no comparative films these are normal  Imaging Results (Most Recent)       Procedure Component Value Units Date/Time    XR Shoulder 2+ View Right [[id number]] Resulted: 08/02/24 1319     Updated: 08/02/24 1319    Impression:      Ordering physician's impression is located in the Encounter Note dated 08/02/24. X-ray performed in the DR room.            Assessment/Plan: Right shoulder pain.  I think her mechanism of injury certainly matches her symptoms and I attribute this to her work injury and my differential be rotator cuff and SLAP pathology she is miserable enough I think an injection is reasonable she is breast-feeding so I do not want to put her anything orally we will do a subacromial injection I will start her into physical therapy I will keep her off work if she fails to improve we will get an MRI.  Ultimately get an MRI but it with an arthrogram  Cortisone Injection. See procedure note.  Cortisone Injection for DIAGNOSTIC and THERAPUTIC purposes.

## 2024-08-02 NOTE — LETTER
August 2, 2024     Patient: Mayra Garner   YOB: 2000   Date of Visit: 8/2/2024       To Whom It May Concern:    It is my medical opinion that Mayra Garner will need to be off work for the next week due to a shoulder injury.           Sincerely,        Key Kemp MD    CC:   No Recipients

## 2024-08-22 ENCOUNTER — HOSPITAL ENCOUNTER (OUTPATIENT)
Dept: PHYSICAL THERAPY | Facility: HOSPITAL | Age: 24
Discharge: HOME | End: 2024-08-22
Admitting: ORTHOPAEDIC SURGERY
Payer: COMMERCIAL

## 2024-08-22 DIAGNOSIS — M75.41 IMPINGEMENT SYNDROME OF RIGHT SHOULDER: Primary | ICD-10-CM

## 2024-08-22 DIAGNOSIS — M25.611 DECREASED RANGE OF MOTION OF RIGHT SHOULDER: ICD-10-CM

## 2024-08-22 DIAGNOSIS — R29.898 WEAKNESS OF RIGHT SHOULDER: ICD-10-CM

## 2024-08-22 PROCEDURE — 97161 PT EVAL LOW COMPLEX 20 MIN: CPT

## 2024-08-22 NOTE — THERAPY EVALUATION
Outpatient Physical Therapy Ortho Initial Evaluation  [facility]     Patient Name: Mayra Garner  : 2000  MRN: [id number]  Today's Date: 2024      Visit Date: 2024    Patient Active Problem List   Diagnosis    Congenital malformation of peripheral vascular system, unspecified    Lactating mother        Past Medical History:   Diagnosis Date    Trauma     10 years old    Vascular abnormality     Right Arm         Past Surgical History:   Procedure Laterality Date    OTHER SURGICAL HISTORY Right     Arm sclerotherapy x5       Visit Dx:     ICD-10-CM ICD-9-CM   1. Impingement syndrome of right shoulder  M75.41 726.2   2. Decreased range of motion of right shoulder  M25.611 719.51   3. Weakness of right shoulder  R29.898 781.99          Patient History       Row Name 24 0700             History    Chief Complaint Pain;Difficulty with daily activities  -АНДРЕЙ      Type of Pain Shoulder pain  R  -JA      Date Current Problem(s) Began 24  -АНДРЕЙ      Brief Description of Current Complaint Impingement R shoulder, s/p cortisone on , R-hand dominant, that began 24 when at work she had to restrain a combative, psychotic pt and stopped them from hitting a broken window.  The next day she noted severe pain in R shoulder and her activity has been limited. She reports opening doors is painful. She has a 11-month old and caring for him requires both hands like when trying to do a diaper change and he squirms. Did get some relief from the cortisone injection. Functional reaching across her body to pull baby wipes is difficult and painful, trying to reach behind back is limited and painful. She is currently off work. 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 reaching across or behind body  -JA      Is your sleep disturbed? Yes  -JA      Is medication used to assist with sleep? No  -JA         Fall Risk Assessment    Any falls in the past year: Unspecified  -JA         Daily Activities    Primary Language English  -JA      Are you able to read Yes  -JA      Are you able to write Yes  -JA      How does patient learn best? Listening;Pictures/Video  -JA      Teaching needs identified Home Exercise Program;Management of Condition  -JA      Barriers to learning None  -JA      Recommended Referrals Physical Therapy  -JA      Pt Participated in POC and Goals Yes  -JA                User Key  (r) = Recorded By, (t) = Taken By, (c) = Cosigned By      Initials Name Provider Type    Yvette Garrett, PT Physical Therapist                     PT Ortho       Row Name 08/22/24 0800       Subjective    Subjective Comments initial eval  -JA       Precautions and Contraindications    Precautions instructed pt to avoid overhead lifting and lifting with outstretched arm  -JA       Posture/Observations    Alignment Options Forward head;Cervical lordosis;Thoracic kyphosis;Rounded shoulders;Scapular winging  -JA    Forward Head Increased;Mild  -JA    Cervical Lordosis Decreased  -JA    Thoracic Kyphosis Mild;Increased  -JA    Rounded Shoulders Increased;Mild;Moderate  -JA    Scapular winging Right:;Mild;Moderate;Left:;Normal  -JA       Special Tests/Palpation    Special  Tests/Palpation Shoulder  -JA       Shoulder Impingement/Rotator Cuff Special Tests    Allen-Damien Test (RC Lesion vs. Bursitis) Right:;Positive  -JA    Neer Impingement Test (RC Lesion vs. Bursitis) Right:;Positive  -JA    Full Can Test (RC Lesion) Right:;Bilateral:  -JA    Empty Can Test (RC Lesion) Right:;Bilateral:  -JA       Shoulder Girdle Palpation    Supraspinatus Insertion Right:;Tender;Guarded/taut  -JA    Upper Trap Right:  -JA    Levator Scapula Right:  -JA       General ROM    RT Upper Ext Rt Shoulder Extension;Rt Shoulder Flexion;Rt Elbow Extension/Flexion;Rt Shoulder External Rotation;Rt Shoulder Internal Rotation;Rt Shoulder ABduction  -JA       Right Upper Ext    Rt Shoulder Abduction AROM 110  -JA    Rt Shoulder Abduction PROM 90  guarded/painful  -JA    Rt Shoulder Flexion AROM 138  pain began at 50 deg  -JA    Rt Shoulder Flexion PROM 140  -JA    Rt Shoulder External Rotation AROM hands behind head  -JA    Rt Shoulder External Rotation PROM 60  -JA    Rt Shoulder Internal Rotation AROM T12  -JA    Rt Shoulder Internal Rotation PROM 60  -JA    Rt Upper Extremity Comments  pain limited end range  -       MMT (Manual Muscle Testing)    Rt Upper Ext Rt Shoulder Flexion;Rt Shoulder ABduction;Rt Shoulder ADduction;Rt Shoulder Internal Rotation;Rt Shoulder External Rotation;Rt Elbow Flexion;Rt Elbow Extension  -JA    Lt Upper Ext Lt Shoulder WFL  -JA       MMT Right Upper Ext    Rt Shoulder Flexion MMT, Gross Movement (3/5) fair  -JA    Rt Shoulder ABduction MMT, Gross Movement (3+/5) fair plus  -JA    Rt Shoulder Internal Rotation MMT, Gross Movement (4-/5) good minus  -JA    Rt Shoulder External Rotation MMT, Gross Movement (3+/5) fair plus  -JA    Rt Elbow Flexion MMT, Gross Movement: (4-/5) good minus  -JA    Rt Elbow Extension MMT, Gross Movement: (4-/5) good minus  -JA    Rt Upper Extremity Comments  pain limited  -              User Key  (r) = Recorded By, (t) = Taken By, (c) = Cosigned  By      Initials Name Provider Type    Yvette Garrett, PT Physical Therapist                                Therapy Education  Education Details: Discussed POC to restore ROM and then add strengthening, importance of scapular muscle strength and posture .  Given: Posture/body mechanics  How Provided: Verbal, Demonstration  Level of Understanding: Demonstrated      PT OP Goals       Row Name 08/22/24 1600          PT Short Term Goals    STG Date to Achieve 09/21/24  -АНДРЕЙ     STG 1 Pt will be independent with initial HEP (pendulum, elbow, wrist, hand ROM)  -АНДРЕЙ     STG 1 Progress New  -     STG 2 Pt will demonstrate good posture in sitting and standing to reduce shoulder/scapular region irritation.  -АНДРЕЙ     STG 2 Progress New  -     STG 3 Pt will demonstrate =/> 0-160 ROM flexion, 0-80 ER.  -АНДРЕЙ     STG 3 Progress New  -JA     STG 4 Pt will report 50% decrease in pain at night.  -АНДРЕЙ     STG 4 Progress New  -АНДРЕЙ        Long Term Goals    LTG Date to Achieve 10/21/24  -АНДРЕЙ     LTG 1 Pt will be independent with shoulder AROM and scapular stabilization HEP.  -АНДРЕЙ     LTG 1 Progress New  -     LTG 2 Patient will be able to lift up to 50 pounds to meet lifting requirements for work.  -АНДРЕЙ     LTG 2 Progress New  -     LTG 3 Pt will demonstrate increased AROM to WFL all planes for  ADLs and work duties.  -JA     LTG 3 Progress New  -     LTG 4 Pt will demonstrate increased R shoulder strength to 4+/5 or better.  -АНДРЕЙ     LTG 4 Progress New  -     LTG 5 Pt will score 30 or less on  QuickDASH indicating decrease in perceived functional disability.  -АНДРЕЙ     LTG 5 Progress New  -АНДРЕЙ        Time Calculation    PT Goal Re-Cert Due Date 11/20/24  -АНДРЕЙ               User Key  (r) = Recorded By, (t) = Taken By, (c) = Cosigned By      Initials Name Provider Type    Yvette Garrett, PT Physical Therapist                     PT Assessment/Plan       Row Name 08/22/24 1700          PT Assessment    Functional Limitations  Limitation in home management;Limitations in community activities;Limitations in functional capacity and performance;Performance in leisure activities;Performance in self-care ADL;Performance in work activities  -     Impairments Range of motion;Muscle strength;Posture;Sensation;Poor body mechanics;Pain  -JA     Assessment Comments Mayra Garner is a 24 y.o. female referred to outpatient physical therapy for evaluation and treatment of right shoulder pain secondary to shoulder impingement that began following restraining a patient at work.  Patient presents with decreased A/PROM R shoulder, decreased strength, positive H-K and Neers impingement signs, point tenderness supraspinatus tendon, impaired functional use for ADLs as well as work. Pt must lift up to 50# at work and be able to push, pull, manipulate objects, and assist patients. Signs and symptoms are consistent with referring diagnosis.  Pertinent comorbidities and personal factors that may affect progress include, but are not limited to, caring for 11 month old son.  This condition is stable. Recommend skilled PT to address functional deficits. Thank you for this referral.  -АНДРЕЙ     Please refer to paper survey for additional self-reported information No  -JA     Rehab Potential Good  -     Patient/caregiver participated in establishment of treatment plan and goals Yes  -     Patient would benefit from skilled therapy intervention Yes  -JA        PT Plan    PT Frequency 2x/week  -АНДРЕЙ     Predicted Duration of Therapy Intervention (PT) 8-12 visits  -     Planned CPT's? PT EVAL LOW COMPLEXITY: 89898;PT RE-EVAL: 23727;PT THER PROC EA 15 MIN: 18615;PT THER ACT EA 15 MIN: 42223;PT MANUAL THERAPY EA 15 MIN: 68137;PT NEUROMUSC RE-EDUCATION EA 15 MIN: 97742;PT SELF CARE/HOME MGMT/TRAIN EA 15: 22244  -     PT Plan Comments warm up on shoulder pulley, see OP ex for suggested ther ex  -JA               User Key  (r) = Recorded By, (t) = Taken By, (c) =  Cosigned By      Initials Name Provider Type    Yvette Garrett, PT Physical Therapist                       OP Exercises       Row Name 08/22/24 0800             Subjective    Subjective Comments initial eval  -JA         Subjective Pain    Able to rate subjective pain? yes  -JA      Pre-Treatment Pain Level 0  -JA      Post-Treatment Pain Level 0  -JA      Subjective Pain Comment pain can be severe with certain movements but is brief  -JA         Exercise 1    Exercise Name 1 shld pulley warm-up  -JA      Additional Comments next visit  -JA         Exercise 2    Exercise Name 2 rev rolls  -JA      Additional Comments next visit  -JA         Exercise 3    Exercise Name 3 scap ret w/depression  -JA      Additional Comments next visit  -JA         Exercise 4    Exercise Name 4 FIR stretch w/towel  -JA      Additional Comments next visit  -JA         Exercise 5    Exercise Name 5 supine AAROM flex  -JA      Additional Comments next visit  -JA         Exercise 6    Exercise Name 6 beach pose for BREE  -JA      Additional Comments next visit  -JA         Exercise 7    Exercise Name 7 supine AAROM shld abd  -JA      Additional Comments next visit  -JA         Exercise 8    Exercise Name 8 standing maine shld flex on wall with towel  -JA                User Key  (r) = Recorded By, (t) = Taken By, (c) = Cosigned By      Initials Name Provider Type    Yvette Garrett, PT Physical Therapist                                  Outcome Measure Options: Quick DASH  Quick DASH  Open a tight or new jar.: Mild Difficulty  Do heavy household chores (e.g., wash walls, wash floors): Severe Difficulty  Carry a shopping bag or briefcase: Mild Difficulty  Wash your back: Unable  Use a knife to cut food: Moderate Difficulty  Recreational activities in which you take some force or impact through your arm, should or hand (e.g. golf, hammering, tennis, etc.): Unable  During the past week, to what extent has your arm, shoulder, or  hand problem interfered with your normal social activites with family, friends, neighbors or groups?: Quite a bit  During the past week, were you limited in your work or other regular daily activities as a result of your arm, shoulder or hand problem?: Unable  Arm, Shoulder, or hand pain: Severe  Tingling (pins and needles) in your arm, shoulder, or hand: None  During the past week, how much difficulty have you had sleeping because of the pain in your arm, shoulder or hand?: Moderate Difficiculty  Number of Questions Answered: 11  Quick DASH Score: 61.36         Time Calculation:     Start Time: 0754  Stop Time: 0835  Time Calculation (min): 41 min  Untimed Charges  PT Eval/Re-eval Minutes: 41  Total Minutes  Untimed Charges Total Minutes: 41   Total Minutes: 41     Therapy Charges for Today       Code Description Service Date Service Provider Modifiers Qty    36183937048 HC PT EVAL LOW COMPLEXITY 3 8/22/2024 Yvette Montero, PT GP 1            PT G-Codes  Outcome Measure Options: Quick DASH  Quick DASH Score: 61.36         Yvette Montero PT  8/22/2024

## 2024-08-26 ENCOUNTER — TELEPHONE (OUTPATIENT)
Dept: ORTHOPEDIC SURGERY | Facility: CLINIC | Age: 24
End: 2024-08-26

## 2024-08-26 RX ORDER — SERTRALINE HYDROCHLORIDE 25 MG/1
25 TABLET, FILM COATED ORAL DAILY
Qty: 90 TABLET | Refills: 0 | Status: SHIPPED | OUTPATIENT
Start: 2024-08-26

## 2024-09-09 ENCOUNTER — TELEPHONE (OUTPATIENT)
Dept: OBSTETRICS AND GYNECOLOGY | Age: 24
End: 2024-09-09
Payer: COMMERCIAL

## 2024-09-09 NOTE — TELEPHONE ENCOUNTER
Pt  called stating she went to [place] on Saturday for bleeding and cramping.  She is breastfeeding and has not had a period since her baby was born in October.  U/S on Saturday showed IUP about 6 wks.  She states she was passing clots then but is now just having light bleeding more like bloody discharge and no cramping.  She has an appt scheduled Thursday 09/12 for preg confirmation and an U/S.  Pt advised to go to ER at [facility] if her bleeding and/or cramping worsens and understanding verbalized.  Please advise if she needs to be seen sooner than Thursday in the office.

## 2024-09-09 NOTE — TELEPHONE ENCOUNTER
Pt called back with bright red bleeding,no us available this afternoon, pt scheduled at 0900 with PA- is that OK

## 2024-09-10 ENCOUNTER — OFFICE VISIT (OUTPATIENT)
Dept: OBSTETRICS AND GYNECOLOGY | Age: 24
End: 2024-09-10
Payer: COMMERCIAL

## 2024-09-10 VITALS
SYSTOLIC BLOOD PRESSURE: 110 MMHG | WEIGHT: 161 LBS | DIASTOLIC BLOOD PRESSURE: 72 MMHG | HEIGHT: 66 IN | BODY MASS INDEX: 25.88 KG/M2

## 2024-09-10 DIAGNOSIS — O03.9 MISCARRIAGE: Primary | ICD-10-CM

## 2024-09-10 PROCEDURE — 1160F RVW MEDS BY RX/DR IN RCRD: CPT | Performed by: NURSE PRACTITIONER

## 2024-09-10 PROCEDURE — 1159F MED LIST DOCD IN RCRD: CPT | Performed by: NURSE PRACTITIONER

## 2024-09-10 PROCEDURE — 99213 OFFICE O/P EST LOW 20 MIN: CPT | Performed by: NURSE PRACTITIONER

## 2024-09-10 NOTE — PROGRESS NOTES
"Subjective     Chief Complaint   Patient presents with    Gynecologic Exam     Early pregnancy, ER followup on 2024,  bleeding and cramping, US today  CC:  no problems       Mayra Garner is a 24 y.o.  whose LMP is No LMP recorded. (Menstrual status: Oral contraceptives).     Pt presents today for follow up from ER  She was seen on  for cramping and bleeding  US did show live IUP with FHR of 114  There is no fetal pole or cardiac activity seen today  There is a small GS seen near CRAIG  Pt is still bleeding and cramping but it has slowed down  Pt is A+    No Additional Complaints Reported    The following portions of the patient's history were reviewed and updated as appropriate:vital signs, allergies, current medications, past medical history, past social history, past surgical history, and problem list      Review of Systems   Pertinent items are noted in HPI.     Objective      /72   Ht 167.6 cm (66\")   Wt 73 kg (161 lb)   Breastfeeding Yes   BMI 25.99 kg/m²     Physical Exam    General:   alert and no distress   Heart: Not performed today   Lungs: Not performed today.   Breast: Not performed today   Neck: na   Abdomen: {Not performed today   CVA: Not performed today   Pelvis: Not performed today   Extremities: Not performed today   Neurologic: negative   Psychiatric: Normal affect, judgement, and mood       Lab Review   Labs: Blood type A+     Imaging   Ultrasound - Pelvic Vaginal    Assessment & Plan     ASSESSMENT  1. Miscarriage        PLAN  1. No orders of the defined types were placed in this encounter.      2. Medications prescribed this encounter:      No orders of the defined types were placed in this encounter.      3. Recommend ibuprofen for bleeding/cramping. ER warnings discussed. Plan 1 week follow up with repeat US with Dr Sanz to ensure complete resolution.      Marcela Buitrago, APRN  9/10/2024           " Left detailed message on VM (ok per HIPAA) with denial/approval info on imaging studies ordered. Provided cental scheduling number, the authorized time frame and that the [facility] Carotids would be cancelled in our system as insurance is not covering.  Encourage

## 2024-09-16 ENCOUNTER — TELEPHONE (OUTPATIENT)
Dept: ORTHOPEDIC SURGERY | Facility: CLINIC | Age: 24
End: 2024-09-16

## 2024-09-16 NOTE — TELEPHONE ENCOUNTER
Provider: DR REBA Peñaer: PATIENT     Phone Number: [phone]    Reason for Call: PATIENT IS ON WORKERS COMP AND SHE IS STILL HAVING PAIN AND LIMITED RANGE OF MOTION. SHE NEEDS A NOTE FOR HER JOB STATING SHE IS ON LIGHT DUTY. SHE IS STILL DOING PHYSICAL THERAPY BUT HAD TO MISS A FEW APPTS DUE TO ANOTHER MEDICAL ISSUE THAT CAME UP. PLEASE CALL TO DISCUSS.

## 2024-09-16 NOTE — TELEPHONE ENCOUNTER
Provider: REBA    Caller: NEVIN    Relationship to Patient: W/C     Pharmacy:     Phone Number: [phone]    Reason for Call: W/C  CALLING TO GET WORK STATUS UPDATE FOR PT - PLEASE CALL BACK [phone] OR FAX INFORMATION [phone]

## 2024-09-17 ENCOUNTER — HOSPITAL ENCOUNTER (OUTPATIENT)
Dept: PHYSICAL THERAPY | Facility: HOSPITAL | Age: 24
Setting detail: THERAPIES SERIES
Discharge: HOME | End: 2024-09-17
Payer: COMMERCIAL

## 2024-09-17 DIAGNOSIS — M75.41 IMPINGEMENT SYNDROME OF RIGHT SHOULDER: Primary | ICD-10-CM

## 2024-09-17 DIAGNOSIS — R29.898 WEAKNESS OF RIGHT SHOULDER: ICD-10-CM

## 2024-09-17 DIAGNOSIS — M25.611 DECREASED RANGE OF MOTION OF RIGHT SHOULDER: ICD-10-CM

## 2024-09-17 PROCEDURE — 97110 THERAPEUTIC EXERCISES: CPT

## 2024-09-17 NOTE — TELEPHONE ENCOUNTER
PATIENT CALLED; NOTE ALSO NEEDS TO STATE IF NO LIGHT DUTY AVAILABLE PATIENT SHOULD BE OFF WORK    PLEASE ADDEND LETTER AND UPLOAD TO [employer] FOR PATIENT

## 2024-09-18 ENCOUNTER — OFFICE VISIT (OUTPATIENT)
Dept: OBSTETRICS AND GYNECOLOGY | Age: 24
End: 2024-09-18
Payer: COMMERCIAL

## 2024-09-18 VITALS
WEIGHT: 161 LBS | BODY MASS INDEX: 25.88 KG/M2 | DIASTOLIC BLOOD PRESSURE: 72 MMHG | SYSTOLIC BLOOD PRESSURE: 108 MMHG | HEIGHT: 66 IN

## 2024-09-18 DIAGNOSIS — O02.1 MISSED AB: Primary | ICD-10-CM

## 2024-09-24 ENCOUNTER — TELEPHONE (OUTPATIENT)
Dept: PHYSICAL THERAPY | Facility: HOSPITAL | Age: 24
End: 2024-09-24
Payer: COMMERCIAL

## 2024-10-01 ENCOUNTER — APPOINTMENT (OUTPATIENT)
Dept: ULTRASOUND IMAGING | Facility: HOSPITAL | Age: 24
End: 2024-10-01
Payer: COMMERCIAL

## 2024-10-01 ENCOUNTER — HOSPITAL ENCOUNTER (EMERGENCY)
Facility: HOSPITAL | Age: 24
Discharge: HOME | End: 2024-10-02
Attending: STUDENT IN AN ORGANIZED HEALTH CARE EDUCATION/TRAINING PROGRAM
Payer: COMMERCIAL

## 2024-10-01 VITALS
HEART RATE: 90 BPM | TEMPERATURE: 98.2 F | SYSTOLIC BLOOD PRESSURE: 118 MMHG | RESPIRATION RATE: 16 BRPM | OXYGEN SATURATION: 100 % | DIASTOLIC BLOOD PRESSURE: 76 MMHG

## 2024-10-01 DIAGNOSIS — K52.9 GASTROENTERITIS: Primary | ICD-10-CM

## 2024-10-01 LAB
ALBUMIN SERPL-MCNC: 4.5 G/DL (ref 3.5–5.2)
ALBUMIN/GLOB SERPL: 1.6 G/DL
ALP SERPL-CCNC: 81 U/L (ref 39–117)
ALT SERPL W P-5'-P-CCNC: 14 U/L (ref 1–33)
ANION GAP SERPL CALCULATED.3IONS-SCNC: 13 MMOL/L (ref 5–15)
AST SERPL-CCNC: 18 U/L (ref 1–32)
BASOPHILS # BLD AUTO: 0.02 10*3/MM3 (ref 0–0.2)
BASOPHILS NFR BLD AUTO: 0.2 % (ref 0–1.5)
BILIRUB SERPL-MCNC: 0.5 MG/DL (ref 0–1.2)
BILIRUB UR QL STRIP: NEGATIVE
BUN SERPL-MCNC: 10 MG/DL (ref 6–20)
BUN/CREAT SERPL: 13.2 (ref 7–25)
CALCIUM SPEC-SCNC: 9.1 MG/DL (ref 8.6–10.5)
CHLORIDE SERPL-SCNC: 104 MMOL/L (ref 98–107)
CLARITY UR: CLEAR
CO2 SERPL-SCNC: 24 MMOL/L (ref 22–29)
COLOR UR: YELLOW
CREAT SERPL-MCNC: 0.76 MG/DL (ref 0.57–1)
D-LACTATE SERPL-SCNC: 0.7 MMOL/L (ref 0.5–2)
DEPRECATED RDW RBC AUTO: 39.3 FL (ref 37–54)
EGFRCR SERPLBLD CKD-EPI 2021: 113.1 ML/MIN/1.73
EOSINOPHIL # BLD AUTO: 0.03 10*3/MM3 (ref 0–0.4)
EOSINOPHIL NFR BLD AUTO: 0.3 % (ref 0.3–6.2)
ERYTHROCYTE [DISTWIDTH] IN BLOOD BY AUTOMATED COUNT: 12.1 % (ref 12.3–15.4)
GLOBULIN UR ELPH-MCNC: 2.9 GM/DL
GLUCOSE SERPL-MCNC: 89 MG/DL (ref 65–99)
GLUCOSE UR STRIP-MCNC: NEGATIVE MG/DL
HCG SERPL QL: NEGATIVE
HCT VFR BLD AUTO: 40.7 % (ref 34–46.6)
HGB BLD-MCNC: 13.2 G/DL (ref 12–15.9)
HGB UR QL STRIP.AUTO: NEGATIVE
IMM GRANULOCYTES # BLD AUTO: 0.05 10*3/MM3 (ref 0–0.05)
IMM GRANULOCYTES NFR BLD AUTO: 0.4 % (ref 0–0.5)
KETONES UR QL STRIP: ABNORMAL
LEUKOCYTE ESTERASE UR QL STRIP.AUTO: NEGATIVE
LIPASE SERPL-CCNC: 21 U/L (ref 13–60)
LYMPHOCYTES # BLD AUTO: 0.94 10*3/MM3 (ref 0.7–3.1)
LYMPHOCYTES NFR BLD AUTO: 7.9 % (ref 19.6–45.3)
MCH RBC QN AUTO: 29.1 PG (ref 26.6–33)
MCHC RBC AUTO-ENTMCNC: 32.4 G/DL (ref 31.5–35.7)
MCV RBC AUTO: 89.8 FL (ref 79–97)
MONOCYTES # BLD AUTO: 0.9 10*3/MM3 (ref 0.1–0.9)
MONOCYTES NFR BLD AUTO: 7.6 % (ref 5–12)
NEUTROPHILS NFR BLD AUTO: 83.6 % (ref 42.7–76)
NEUTROPHILS NFR BLD AUTO: 9.9 10*3/MM3 (ref 1.7–7)
NITRITE UR QL STRIP: NEGATIVE
NRBC BLD AUTO-RTO: 0 /100 WBC (ref 0–0.2)
PH UR STRIP.AUTO: 7 [PH] (ref 5–8)
PLATELET # BLD AUTO: 290 10*3/MM3 (ref 140–450)
PMV BLD AUTO: 8.7 FL (ref 6–12)
POTASSIUM SERPL-SCNC: 3.5 MMOL/L (ref 3.5–5.2)
PROT SERPL-MCNC: 7.4 G/DL (ref 6–8.5)
PROT UR QL STRIP: NEGATIVE
RBC # BLD AUTO: 4.53 10*6/MM3 (ref 3.77–5.28)
SODIUM SERPL-SCNC: 141 MMOL/L (ref 136–145)
SP GR UR STRIP: 1.01 (ref 1–1.03)
UROBILINOGEN UR QL STRIP: ABNORMAL
WBC NRBC COR # BLD AUTO: 11.84 10*3/MM3 (ref 3.4–10.8)

## 2024-10-01 PROCEDURE — 96361 HYDRATE IV INFUSION ADD-ON: CPT

## 2024-10-01 PROCEDURE — 25810000003 LACTATED RINGERS SOLUTION: Performed by: PHYSICIAN ASSISTANT

## 2024-10-01 PROCEDURE — 85025 COMPLETE CBC W/AUTO DIFF WBC: CPT | Performed by: PHYSICIAN ASSISTANT

## 2024-10-01 PROCEDURE — 84703 CHORIONIC GONADOTROPIN ASSAY: CPT | Performed by: PHYSICIAN ASSISTANT

## 2024-10-01 PROCEDURE — 25010000002 ONDANSETRON PER 1 MG: Performed by: PHYSICIAN ASSISTANT

## 2024-10-01 PROCEDURE — 80053 COMPREHEN METABOLIC PANEL: CPT | Performed by: PHYSICIAN ASSISTANT

## 2024-10-01 PROCEDURE — 96375 TX/PRO/DX INJ NEW DRUG ADDON: CPT

## 2024-10-01 PROCEDURE — 99284 EMERGENCY DEPT VISIT MOD MDM: CPT

## 2024-10-01 PROCEDURE — 83690 ASSAY OF LIPASE: CPT | Performed by: PHYSICIAN ASSISTANT

## 2024-10-01 PROCEDURE — 83605 ASSAY OF LACTIC ACID: CPT | Performed by: PHYSICIAN ASSISTANT

## 2024-10-01 PROCEDURE — 81003 URINALYSIS AUTO W/O SCOPE: CPT | Performed by: PHYSICIAN ASSISTANT

## 2024-10-01 PROCEDURE — 76705 ECHO EXAM OF ABDOMEN: CPT

## 2024-10-01 PROCEDURE — 96374 THER/PROPH/DIAG INJ IV PUSH: CPT

## 2024-10-01 RX ORDER — ONDANSETRON 2 MG/ML
4 INJECTION INTRAMUSCULAR; INTRAVENOUS ONCE
Status: COMPLETED | OUTPATIENT
Start: 2024-10-01 | End: 2024-10-01

## 2024-10-01 RX ORDER — FAMOTIDINE 10 MG/ML
20 INJECTION, SOLUTION INTRAVENOUS ONCE
Status: COMPLETED | OUTPATIENT
Start: 2024-10-01 | End: 2024-10-01

## 2024-10-01 RX ADMIN — SODIUM CHLORIDE, POTASSIUM CHLORIDE, SODIUM LACTATE AND CALCIUM CHLORIDE 1000 ML: 600; 310; 30; 20 INJECTION, SOLUTION INTRAVENOUS at 22:39

## 2024-10-01 RX ADMIN — ONDANSETRON 4 MG: 2 INJECTION, SOLUTION INTRAMUSCULAR; INTRAVENOUS at 21:46

## 2024-10-01 RX ADMIN — FAMOTIDINE 20 MG: 10 INJECTION INTRAVENOUS at 21:47

## 2024-10-02 RX ORDER — ONDANSETRON 4 MG/1
4 TABLET, ORALLY DISINTEGRATING ORAL EVERY 8 HOURS PRN
Qty: 20 TABLET | Refills: 0 | Status: SHIPPED | OUTPATIENT
Start: 2024-10-02

## 2024-10-02 NOTE — ED PROVIDER NOTES
EMERGENCY DEPARTMENT MD ATTESTATION NOTE    Room Number:  S03/03  PCP: Neva Ortiz APRN  Independent Historians: Patient    HPI:    Context: Mayra Garner is a 24 y.o. female who presents to the ED c/o acute abdominal pain, nausea, vomiting.  Patient denies fever and chills.  Patient denies previous abdominal surgeries patient had recent miscarriage.      PHYSICAL EXAM    I have reviewed the triage vital signs and nursing notes.    ED Triage Vitals [10/01/24 2058]   Temp Heart Rate Resp BP SpO2   98.2 °F (36.8 °C) 90 16 118/76 100 %      Temp src Heart Rate Source Patient Position BP Location FiO2 (%)   Tympanic -- -- -- --       Physical Exam  GENERAL: alert, no acute distress  SKIN: Warm, dry  HENT: Normocephalic, atraumatic  EYES: no scleral icterus  CV: regular rhythm, regular rate  RESPIRATORY: normal effort, lungs clear  ABDOMEN: soft, tender to palpation in the epigastrium  MUSCULOSKELETAL: no deformity  NEURO: alert, moves all extremities, follows commands            MEDICATIONS GIVEN IN ER  Medications   lactated ringers bolus 1,000 mL (has no administration in time range)   ondansetron (ZOFRAN) injection 4 mg (has no administration in time range)   famotidine (PEPCID) injection 20 mg (has no administration in time range)         ORDERS PLACED DURING THIS VISIT:  Orders Placed This Encounter   Procedures    US Gallbladder    Comprehensive Metabolic Panel    hCG, Serum, Qualitative    Urinalysis With Microscopic If Indicated (No Culture) - Urine, Clean Catch    Lactic Acid, Plasma    CBC Auto Differential    CBC & Differential         PROCEDURES  Procedures            PROGRESS, DATA ANALYSIS, CONSULTS, AND MEDICAL DECISION MAKING  All labs have been independently interpreted by me.  All radiology studies have been reviewed by me. All EKG's have been independently viewed and interpreted by me.  Discussion below represents my analysis of pertinent findings related to patient's condition, differential  diagnosis, treatment plan and final disposition.    Differential diagnosis includes but is not limited to biliary pathology, gastritis, pancreatitis.    Clinical Scores:                   ED Course as of 10/03/24 1305   Tue Oct 01, 2024   2133 I discussed the case with Dr. Bowman and he agrees to evaluate the patient at the bedside.    [CC]   2224 WBC(!): 11.84 [CC]   2224 HCG Qualitative: Negative [CC]   2231 Lactate: 0.7 [CC]   2234 ALT (SGPT): 14 [CC]   2234 AST (SGOT): 18 [CC]   2234 Alkaline Phosphatase: 81  Patient had a somewhat abnormal appearing liver on ultrasound and it was recommended to correlate with LFTs.  Her LFTs are normal today.  This is of uncertain etiology but will have her follow-up with PCP regarding  possible hemangioma [CC]   2301 Lipase: 21 [CC]   Wed Oct 02, 2024   0011 I rechecked the patient.  I discussed the patient's labs, radiology findings (including all incidental findings), diagnosis, and plan for discharge.  A repeat exam reveals no new worrisome changes from my initial exam findings.  The patient understands that the fact that they are being discharged does not denote that nothing is abnormal, it indicates that no clinical emergency is present and that they must follow-up as directed in order to properly maintain their health.  Follow-up instructions (specifically listed below) and return to ER precautions were given at this time.  I specifically instructed the patient to follow-up with their PCP.  The patient understands and agrees with the plan, and is ready for discharge.  All questions answered.   [CC]      ED Course User Index  [CC] Alicia Szymanski PA-C       MDM: 24-year-old female presenting for evaluation of abdominal pain.  Pain is located in the right upper quadrant.  Laboratory evaluations overall unremarkable.  Right upper quadrant ultrasound demonstrates no evidence of cholecystitis.  Patient's symptoms and presentation consistent with gastroenteritis.  Counseled  patient on need to follow-up with primary care.  Strict return precautions given.  Patient discharged with symptomatic management.      COMPLEXITY OF CARE  Admission was considered but after careful review of the patient's presentation, physical examination, diagnostic results, and response to treatment the patient may be safely discharged with outpatient follow-up.    Please note that portions of this document were completed with a voice recognition program.    Note Disclaimer: At [facility], we believe that sharing information builds trust and better relationships. You are receiving this note because you recently visited [facility]. It is possible you will see health information before a provider has talked with you about it. This kind of information can be easy to misunderstand. To help you fully understand what it means for your health, we urge you to discuss this note with your provider.         Servando Bowman MD  10/03/24 5642

## 2024-10-02 NOTE — DISCHARGE INSTRUCTIONS
Please follow-up with your PCP.    Rest.  Increase fluid intake.      Although you are being discharged in the ED today, I encouraged her to return for worsening symptoms.  Things can, and do, change such a treatment at home with medication may not be adequate.  Specifically I recommend returning for chest pain or discomfort, difficulty breathing, persistent vomiting or difficulty holding down liquids or medications, fever > 102.0 F,  or any other worsening or alarming symptoms.     You have been evaluated in the emergency department for your presenting symptoms and deemed appropriate for discharge home.  Understand that your health care does not end here today.  It is important that your primary care physician be made aware of your visit.  I recommend calling your primary care provider in the next 48 hours to arrange follow-up care.  Your primary care provider needs to review your treatment and recovery to ensure that no further treatment is necessary.  Additional testing or procedures may be necessary as an outpatient at the discretion of your primary care provider.    I also recommend following up with your PCP for recheck of your blood pressure and treatment as needed.'

## 2024-10-02 NOTE — ED PROVIDER NOTES
EMERGENCY DEPARTMENT ENCOUNTER  Room Number:  01/01  PCP: Neva Ortiz APRN  Independent Historians: Patient      HPI:  Chief Complaint: had concerns including Nausea.     A complete HPI/ROS/PMH/PSH/SH/FH are unobtainable due to: None    Chronic or social conditions impacting patient care (Social Determinants of Health): None      Context: Mayra Garner is a 24 y.o. female presents emergency department today complaining of upper abdominal pain that started a few hours ago.  Patient describes it as a severe cramping in the upper abdomen.  She reports associated vomiting and diarrhea.  She denies fever or chills.  She says that she felt like she was going to pass out which is why EMS was contacted because she was at home alone with her son.  She did not actually pass out.  She says now the pain in her upper abdomen is just coming in waves.  She denies a history of intra-abdominal surgeries.  She denies chance of pregnancy and says she actually just had a miscarriage about 3 weeks ago.  She says she is still nauseous at this time but has not vomited since arriving here in the ED.  She denies chest pain, shortness of air, cough.  She denies urinary symptoms.      Review of prior external notes (non-ED) -and- Review of prior external test results outside of this encounter:   I reviewed labs from 9/7/2024, hemoglobin 12.3, blood type a positive    PAST MEDICAL HISTORY  Active Ambulatory Problems     Diagnosis Date Noted    Congenital malformation of peripheral vascular system, unspecified 12/01/2015    Lactating mother 10/26/2023     Resolved Ambulatory Problems     Diagnosis Date Noted    Malaise and fatigue 01/28/2022    Adenopathy 01/28/2022    Pain 03/09/2016    FHx: congenital anomaly 03/02/2023    39 weeks gestation of pregnancy 03/02/2023    Maternal varicella, non-immune 03/30/2023    Pregnancy headache in third trimester 03/30/2023    UTI (urinary tract infection) during pregnancy 08/11/2023    39 weeks  gestation of pregnancy 10/02/2023     (normal spontaneous vaginal delivery) 10/03/2023     Past Medical History:   Diagnosis Date    Trauma     Vascular abnormality          PAST SURGICAL HISTORY  Past Surgical History:   Procedure Laterality Date    OTHER SURGICAL HISTORY Right     Arm sclerotherapy x5         FAMILY HISTORY  Family History   Adopted: Yes   Problem Relation Age of Onset    Breast cancer Paternal Grandmother          SOCIAL HISTORY  Social History     Socioeconomic History    Marital status: Single    Number of children: 0    Highest education level: Some college, no degree   Tobacco Use    Smoking status: Never     Passive exposure: Never    Smokeless tobacco: Never   Vaping Use    Vaping status: Never Used   Substance and Sexual Activity    Alcohol use: Not Currently    Drug use: Not Currently    Sexual activity: Yes     Partners: Male     Birth control/protection: None         ALLERGIES  Iodine and Shellfish-derived products      REVIEW OF SYSTEMS  Included in HPI  All systems reviewed and negative except for those discussed in HPI.      PHYSICAL EXAM    I have reviewed the triage vital signs and nursing notes.    ED Triage Vitals [10/01/24 2058]   Temp Heart Rate Resp BP SpO2   98.2 °F (36.8 °C) 90 16 118/76 100 %      Temp src Heart Rate Source Patient Position BP Location FiO2 (%)   Tympanic -- -- -- --       Physical Exam  Constitutional:       General: She is not in acute distress.     Appearance: Normal appearance.      Comments: Appears to feel unwell but nontoxic   HENT:      Head: Normocephalic and atraumatic.      Nose: Nose normal.      Mouth/Throat:      Mouth: Mucous membranes are moist.   Eyes:      Extraocular Movements: Extraocular movements intact.      Pupils: Pupils are equal, round, and reactive to light.   Cardiovascular:      Rate and Rhythm: Normal rate and regular rhythm.      Pulses: Normal pulses.      Heart sounds: Normal heart sounds.   Pulmonary:      Effort:  Pulmonary effort is normal. No respiratory distress.      Breath sounds: Normal breath sounds.   Abdominal:      General: Abdomen is flat. There is no distension.      Palpations: Abdomen is soft.      Tenderness: There is abdominal tenderness in the epigastric area. There is no guarding or rebound.   Musculoskeletal:         General: Normal range of motion.      Cervical back: Normal range of motion and neck supple.   Skin:     General: Skin is warm and dry.      Capillary Refill: Capillary refill takes less than 2 seconds.   Neurological:      General: No focal deficit present.      Mental Status: She is alert and oriented to person, place, and time.   Psychiatric:         Mood and Affect: Mood normal.         Behavior: Behavior normal.           LAB RESULTS  Recent Results (from the past 24 hour(s))   Comprehensive Metabolic Panel    Collection Time: 10/01/24  9:45 PM    Specimen: Blood   Result Value Ref Range    Glucose 89 65 - 99 mg/dL    BUN 10 6 - 20 mg/dL    Creatinine 0.76 0.57 - 1.00 mg/dL    Sodium 141 136 - 145 mmol/L    Potassium 3.5 3.5 - 5.2 mmol/L    Chloride 104 98 - 107 mmol/L    CO2 24.0 22.0 - 29.0 mmol/L    Calcium 9.1 8.6 - 10.5 mg/dL    Total Protein 7.4 6.0 - 8.5 g/dL    Albumin 4.5 3.5 - 5.2 g/dL    ALT (SGPT) 14 1 - 33 U/L    AST (SGOT) 18 1 - 32 U/L    Alkaline Phosphatase 81 39 - 117 U/L    Total Bilirubin 0.5 0.0 - 1.2 mg/dL    Globulin 2.9 gm/dL    A/G Ratio 1.6 g/dL    BUN/Creatinine Ratio 13.2 7.0 - 25.0    Anion Gap 13.0 5.0 - 15.0 mmol/L    eGFR 113.1 >60.0 mL/min/1.73   hCG, Serum, Qualitative    Collection Time: 10/01/24  9:45 PM    Specimen: Blood   Result Value Ref Range    HCG Qualitative Negative Negative   Lactic Acid, Plasma    Collection Time: 10/01/24  9:45 PM    Specimen: Blood   Result Value Ref Range    Lactate 0.7 0.5 - 2.0 mmol/L   CBC Auto Differential    Collection Time: 10/01/24  9:45 PM    Specimen: Blood   Result Value Ref Range    WBC 11.84 (H) 3.40 - 10.80  10*3/mm3    RBC 4.53 3.77 - 5.28 10*6/mm3    Hemoglobin 13.2 12.0 - 15.9 g/dL    Hematocrit 40.7 34.0 - 46.6 %    MCV 89.8 79.0 - 97.0 fL    MCH 29.1 26.6 - 33.0 pg    MCHC 32.4 31.5 - 35.7 g/dL    RDW 12.1 (L) 12.3 - 15.4 %    RDW-SD 39.3 37.0 - 54.0 fl    MPV 8.7 6.0 - 12.0 fL    Platelets 290 140 - 450 10*3/mm3    Neutrophil % 83.6 (H) 42.7 - 76.0 %    Lymphocyte % 7.9 (L) 19.6 - 45.3 %    Monocyte % 7.6 5.0 - 12.0 %    Eosinophil % 0.3 0.3 - 6.2 %    Basophil % 0.2 0.0 - 1.5 %    Immature Grans % 0.4 0.0 - 0.5 %    Neutrophils, Absolute 9.90 (H) 1.70 - 7.00 10*3/mm3    Lymphocytes, Absolute 0.94 0.70 - 3.10 10*3/mm3    Monocytes, Absolute 0.90 0.10 - 0.90 10*3/mm3    Eosinophils, Absolute 0.03 0.00 - 0.40 10*3/mm3    Basophils, Absolute 0.02 0.00 - 0.20 10*3/mm3    Immature Grans, Absolute 0.05 0.00 - 0.05 10*3/mm3    nRBC 0.0 0.0 - 0.2 /100 WBC   Lipase    Collection Time: 10/01/24  9:45 PM    Specimen: Blood   Result Value Ref Range    Lipase 21 13 - 60 U/L   Urinalysis With Microscopic If Indicated (No Culture) - Urine, Clean Catch    Collection Time: 10/01/24 11:45 PM    Specimen: Urine, Clean Catch   Result Value Ref Range    Color, UA Yellow Yellow, Straw    Appearance, UA Clear Clear    pH, UA 7.0 5.0 - 8.0    Specific Gravity, UA 1.014 1.005 - 1.030    Glucose, UA Negative Negative    Ketones, UA 40 mg/dL (2+) (A) Negative    Bilirubin, UA Negative Negative    Blood, UA Negative Negative    Protein, UA Negative Negative    Leuk Esterase, UA Negative Negative    Nitrite, UA Negative Negative    Urobilinogen, UA 1.0 E.U./dL 0.2 - 1.0 E.U./dL         RADIOLOGY  US Gallbladder    Result Date: 10/1/2024  GALLBLADDER ULTRASOUND  HISTORY: Upper abdominal pain  COMPARISON: None available.  TECHNIQUE: Grayscale and color Doppler sonographic images were obtained through the right upper quadrant.  FINDINGS: Visualized portions of the pancreatic parenchyma are normal. However, there is potentially some trace fluid  adjacent to the pancreatic head. There is a hyperechoic lesion within the right lobe of the liver, measuring 1.0 x 0.8 x 0.8 cm. There may be some mild coarsening of hepatic echotexture. There is no intra or extrahepatic biliary dilatation. Common bile duct measures 3 mm. Main portal vein appears to be patent. Right kidney measures 9.2 x 3.7 x 3.9 cm. There is no hydronephrosis. Renal echotexture is normal. No stones or sludge are seen within the gallbladder, and there is no gallbladder wall thickening or pericholecystic fluid. Gallbladder wall measures 2 mm.       1. No evidence of acute cholecystitis. 2. Perhaps mild coarsening of hepatic echotexture. Correlation with liver function tests recommended. There is also an indeterminate hyperechoic focus within the right lobe of the liver. I don't clearly identify this on prior imaging. It may represent a hemangioma. This could be better assessed with liver protocol MRI. 2. While pancreatic parenchyma appears unremarkable, there is some trace fluid adjacent to the head of the pancreas.  This report was finalized on 10/1/2024 10:18 PM by Dr. Kinjal Schuler M.D on Workstation: [id number]         MEDICATIONS GIVEN IN ER  Medications   lactated ringers bolus 1,000 mL (0 mL Intravenous Stopped 10/2/24 0029)   ondansetron (ZOFRAN) injection 4 mg (4 mg Intravenous Given 10/1/24 2146)   famotidine (PEPCID) injection 20 mg (20 mg Intravenous Given 10/1/24 2147)           OUTPATIENT MEDICATION MANAGEMENT:  No current Epic-ordered facility-administered medications on file.     Current Outpatient Medications Ordered in Epic   Medication Sig Dispense Refill    ondansetron ODT (ZOFRAN-ODT) 4 MG disintegrating tablet Place 1 tablet on the tongue Every 8 (Eight) Hours As Needed for Nausea. 20 tablet 0    prenatal vitamin (prenatal, CLASSIC, vitamin) tablet Take  by mouth Daily.             PROGRESS, DATA ANALYSIS, CONSULTS, AND MEDICAL DECISION MAKING  ORDERS PLACED DURING THIS  VISIT:  Orders Placed This Encounter   Procedures    US Gallbladder    Comprehensive Metabolic Panel    hCG, Serum, Qualitative    Urinalysis With Microscopic If Indicated (No Culture) - Urine, Clean Catch    Lactic Acid, Plasma    CBC Auto Differential    Lipase    Po challenge  Nursing Communication    CBC & Differential       All labs have been independently interpreted by me.  All radiology studies have been reviewed by me. All EKG's have been independently viewed and interpreted by me.  Discussion below represents my analysis of pertinent findings related to patient's condition, differential diagnosis, treatment plan and final disposition.    Differential diagnosis includes but is not limited to:   My differential diagnosis for abdominal pain includes but is not limited to:  Gastritis, gastroenteritis, peptic ulcer disease, GERD, esophageal perforation, acute appendicitis, mesenteric adenitis, Meckel’s diverticulum, epiploic appendagitis, diverticulitis, colon cancer, ulcerative colitis, Crohn’s disease, intussusception, small bowel obstruction, adhesions, ischemic bowel, perforated viscus, ileus, obstipation, biliary colic, cholecystitis, cholelithiasis, Alejo-Victor Manuel Dayron, hepatitis, pancreatitis, common bile duct obstruction, cholangitis, bile leak, splenic trauma, splenic rupture, splenic infarction, splenic abscess, abdominal abscess, ascites, spontaneous bacterial peritonitis, hernia, UTI, cystitis, prostatitis, ureterolithiasis, urinary obstruction, ovarian cyst, torsion, pregnancy, ectopic pregnancy, PID, pelvic abscess, mittelschmerz, endometriosis, AAA, myocardial infarction, pneumonia, cancer, porphyria, DKA, medications, sickle cell, viral syndrome, zoster      ED Course:  ED Course as of 10/02/24 0115   e Oct 01, 2024   2133 I discussed the case with Dr. Bowman and he agrees to evaluate the patient at the bedside.    [CC]   2224 WBC(!): 11.84 [CC]   2224 HCG Qualitative: Negative [CC]   2231  Lactate: 0.7 [CC]   2234 ALT (SGPT): 14 [CC]   2234 AST (SGOT): 18 [CC]   2234 Alkaline Phosphatase: 81  Patient had a somewhat abnormal appearing liver on ultrasound and it was recommended to correlate with LFTs.  Her LFTs are normal today.  This is of uncertain etiology but will have her follow-up with PCP regarding  possible hemangioma [CC]   2301 Lipase: 21 [CC]   Wed Oct 02, 2024   0011 I rechecked the patient.  I discussed the patient's labs, radiology findings (including all incidental findings), diagnosis, and plan for discharge.  A repeat exam reveals no new worrisome changes from my initial exam findings.  The patient understands that the fact that they are being discharged does not denote that nothing is abnormal, it indicates that no clinical emergency is present and that they must follow-up as directed in order to properly maintain their health.  Follow-up instructions (specifically listed below) and return to ER precautions were given at this time.  I specifically instructed the patient to follow-up with their PCP.  The patient understands and agrees with the plan, and is ready for discharge.  All questions answered.   [CC]      ED Course User Index  [CC] Alicia Szymanski PA-C           AS OF 01:15 EDT VITALS:    BP - 118/76  HR - 90  TEMP - 98.2 °F (36.8 °C) (Tympanic)  O2 SATS - 100%        MDM:  Patient is a 24-year-old female who presents emergency department today with sudden onset upper abdominal pain with associated nausea, vomiting, diarrhea.  On arrival here in the emergency department vitals are reassuring, she is afebrile.  My exam the patient has mild tenderness to palpation in the epigastrium.  Patient was evaluated with labs which are overall reassuring.  She was subsequent evaluated with an ultrasound of the gallbladder which showed some abnormal echotexture of the liver of uncertain etiology and given her normal liver enzymes I do not suspect this is of clinical significance today.   She also had a little bit of fluid around the pancreatic head but the pancreas appeared unremarkable.  Lipase is normal.  I do not suspect pancreatitis.  I suspect the patient has a gastroenteritis.  After treatment with IV fluids, Zofran, and Pepcid with resolution of symptoms.  On reevaluation patient is feeling much better and tolerating p.o. She will be discharged home with prescription for Zofran.  She is appropriate for discharge with outpatient follow-up.        DIAGNOSIS  Final diagnoses:   Gastroenteritis         DISPOSITION  ED Disposition       ED Disposition   Discharge    Condition   Stable    Comment   --                      Please note that portions of this document were completed with a voice recognition program.    Note Disclaimer: At [facility], we believe that sharing information builds trust and better relationships. You are receiving this note because you recently visited [facility]. It is possible you will see health information before a provider has talked with you about it. This kind of information can be easy to misunderstand. To help you fully understand what it means for your health, we urge you to discuss this note with your provider.     Alicia Szymanski PA-C  10/02/24 0116

## 2024-10-03 DIAGNOSIS — R93.5 ABNORMAL ULTRASOUND OF ABDOMEN: Primary | ICD-10-CM

## 2024-10-08 ENCOUNTER — TELEPHONE (OUTPATIENT)
Dept: PHYSICAL THERAPY | Facility: HOSPITAL | Age: 24
End: 2024-10-08
Payer: COMMERCIAL

## 2024-10-08 NOTE — TELEPHONE ENCOUNTER
Left voicemail for pt regarding no call no show this date. As this is third no call no show, cancelled remaining visit. Notified pt she is able to call same day for appointment as needed.

## 2024-11-08 ENCOUNTER — TELEPHONE (OUTPATIENT)
Dept: ORTHOPEDIC SURGERY | Facility: CLINIC | Age: 24
End: 2024-11-08

## 2024-11-08 NOTE — TELEPHONE ENCOUNTER
Caller: KYARA     Relationship: LARISA Nicholas call back number: [phone]    What form or medical record are you requesting: WORK STATUS - LAST VISIT FOR PATIENT WAS 8-2-24 AND NO FOLLOW UP VISIT     Who is requesting this form or medical record from you: [employer]     How would you like to receive the form or medical records (pick-up, mail, fax): FAX  If fax, what is the fax number: [phone]    Timeframe paperwork needed: ASAP    Additional notes: HE IS ASKING IF DR BRITTON WILL WANT TO SEE HER AGAIN OR IF SHE HAS BEEN RELEASED.

## 2024-11-12 ENCOUNTER — TELEPHONE (OUTPATIENT)
Dept: OBSTETRICS AND GYNECOLOGY | Age: 24
End: 2024-11-12
Payer: COMMERCIAL

## 2024-11-12 NOTE — TELEPHONE ENCOUNTER
PT is early in her pregnancy. Unknown LMP due to miscarriage in September. She has a MRI tomorrow of her liver due to a mass located on her liver. Is it okay for PT to get a MRI?  Please advise.

## 2024-11-12 NOTE — TELEPHONE ENCOUNTER
Caller: Mayra Garner    Relationship: Self    Best call back number: [phone]    What is the best time to reach you: PT RETURNED CALL - HAD VM TO CALL OFFICE    Who are you requesting to speak with (clinical staff, provider,  specific staff member): NOT SURE    Do you know the name of the person who called: ??    What was the call regarding: GETTING AND MRI WHILE IN EARLY PREGNANCY    Is it okay if the provider responds through MyChart: PLEASE CALL PT AS SOON AS POSSIBLE - APPT FOR MRI IS SCHEDULED FOR TOMORROW

## 2024-11-13 ENCOUNTER — HOSPITAL ENCOUNTER (OUTPATIENT)
Dept: MRI IMAGING | Facility: HOSPITAL | Age: 24
Discharge: HOME | End: 2024-11-13
Admitting: NURSE PRACTITIONER
Payer: COMMERCIAL

## 2024-11-13 DIAGNOSIS — R93.5 ABNORMAL ULTRASOUND OF ABDOMEN: ICD-10-CM

## 2024-11-13 PROCEDURE — 74181 MRI ABDOMEN W/O CONTRAST: CPT

## 2024-11-26 ENCOUNTER — OFFICE VISIT (OUTPATIENT)
Dept: OBSTETRICS AND GYNECOLOGY | Age: 24
End: 2024-11-26
Payer: COMMERCIAL

## 2024-11-26 VITALS
WEIGHT: 157 LBS | BODY MASS INDEX: 25.23 KG/M2 | HEIGHT: 66 IN | DIASTOLIC BLOOD PRESSURE: 64 MMHG | SYSTOLIC BLOOD PRESSURE: 98 MMHG

## 2024-11-26 DIAGNOSIS — O09.299 HISTORY OF MISCARRIAGE, CURRENTLY PREGNANT: ICD-10-CM

## 2024-11-26 DIAGNOSIS — Z34.91 PRENATAL CARE IN FIRST TRIMESTER: Primary | ICD-10-CM

## 2024-11-26 RX ORDER — ERGOCALCIFEROL 1.25 MG/1
50000 CAPSULE, LIQUID FILLED ORAL WEEKLY
COMMUNITY
End: 2024-11-26

## 2024-11-26 RX ORDER — CHOLECALCIFEROL (VITAMIN D3) 25 MCG
1000 TABLET ORAL DAILY
COMMUNITY

## 2024-11-26 NOTE — PROGRESS NOTES
"Subjective     Chief Complaint   Patient presents with    Gynecologic Exam     Pregnancy confirmation, lmp unknown, US today  CC:  no problems       Mayra Garner is a 24 y.o.  whose LMP is Patient's last menstrual period was 10/16/2024.     Pt presents today for confirmation of pregnancy  US confirms live IUP at 5+5 with FHR of 110 and EDC of 2025  She is having occl nausea and headache but overall feeling well  No concerns or complaints today  No problems with previous pregnancy/delivery  She is still breastfeeding her son just at night  Pt of Dr. Sanz    No Additional Complaints Reported    The following portions of the patient's history were reviewed and updated as appropriate:vital signs, allergies, current medications, past medical history, past social history, past surgical history, and problem list      Review of Systems   Pertinent items are noted in HPI.     Objective      BP 98/64   Ht 167.6 cm (66\")   Wt 71.2 kg (157 lb)   LMP 10/16/2024 Comment: still breastfeeding  BMI 25.34 kg/m²     Physical Exam    General:   alert and no distress   Heart: Not performed today   Lungs: Not performed today.   Breast: Not performed today   Neck: na   Abdomen: {Not performed today   CVA: Not performed today   Pelvis: Not performed today   Extremities: Not performed today   Neurologic: negative   Psychiatric: Normal affect, judgement, and mood       Lab Review   Labs: No data reviewed     Imaging   Ultrasound - Pelvic Vaginal    Assessment & Plan     ASSESSMENT  1. Prenatal care in first trimester    2. History of miscarriage, currently pregnant        PLAN  1.   Orders Placed This Encounter   Procedures    Urine Culture - Urine, Urine, Clean Catch    Chlamydia trachomatis, Neisseria gonorrhoeae, Trichomonas vaginalis, PCR - Urine, Urine, Clean Catch    OB Panel With HIV and RPR    Varicella Zoster Antibody, IgG       2. Medications prescribed this encounter:      No orders of the defined types were " placed in this encounter.      3. OB labs done    Follow up: 1-2 weeks for f/u viability    RADHA Bowen  11/26/2024

## 2024-11-27 LAB
ABO GROUP BLD: NORMAL
BASOPHILS # BLD AUTO: 0 X10E3/UL (ref 0–0.2)
BASOPHILS NFR BLD AUTO: 1 %
BLD GP AB SCN SERPL QL: NEGATIVE
EOSINOPHIL # BLD AUTO: 0 X10E3/UL (ref 0–0.4)
EOSINOPHIL NFR BLD AUTO: 1 %
ERYTHROCYTE [DISTWIDTH] IN BLOOD BY AUTOMATED COUNT: 12 % (ref 11.7–15.4)
HBV SURFACE AG SERPL QL IA: NEGATIVE
HCT VFR BLD AUTO: 38 % (ref 34–46.6)
HCV AB SERPL QL IA: NORMAL
HCV IGG SERPL QL IA: NON REACTIVE
HGB BLD-MCNC: 12.1 G/DL (ref 11.1–15.9)
HIV 1+2 AB+HIV1 P24 AG SERPL QL IA: NON REACTIVE
IMM GRANULOCYTES # BLD AUTO: 0 X10E3/UL (ref 0–0.1)
IMM GRANULOCYTES NFR BLD AUTO: 0 %
LYMPHOCYTES # BLD AUTO: 2.1 X10E3/UL (ref 0.7–3.1)
LYMPHOCYTES NFR BLD AUTO: 39 %
MCH RBC QN AUTO: 28.3 PG (ref 26.6–33)
MCHC RBC AUTO-ENTMCNC: 31.8 G/DL (ref 31.5–35.7)
MCV RBC AUTO: 89 FL (ref 79–97)
MONOCYTES # BLD AUTO: 0.3 X10E3/UL (ref 0.1–0.9)
MONOCYTES NFR BLD AUTO: 6 %
NEUTROPHILS # BLD AUTO: 2.9 X10E3/UL (ref 1.4–7)
NEUTROPHILS NFR BLD AUTO: 53 %
PLATELET # BLD AUTO: 299 X10E3/UL (ref 150–450)
RBC # BLD AUTO: 4.27 X10E6/UL (ref 3.77–5.28)
RH BLD: POSITIVE
RPR SER QL: NON REACTIVE
RUBV IGG SERPL IA-ACNC: 2.18 INDEX
VZV IGG SER QL IA: NON REACTIVE
WBC # BLD AUTO: 5.4 X10E3/UL (ref 3.4–10.8)

## 2024-11-28 LAB
BACTERIA UR CULT: NORMAL
BACTERIA UR CULT: NORMAL
C TRACH RRNA SPEC QL NAA+PROBE: NEGATIVE
N GONORRHOEA RRNA SPEC QL NAA+PROBE: NEGATIVE
T VAGINALIS RRNA SPEC QL NAA+PROBE: NEGATIVE

## 2024-12-04 ENCOUNTER — TELEPHONE (OUTPATIENT)
Dept: OBSTETRICS AND GYNECOLOGY | Age: 24
End: 2024-12-04
Payer: COMMERCIAL

## 2024-12-04 PROBLEM — O09.899 MATERNAL VARICELLA, NON-IMMUNE: Status: ACTIVE | Noted: 2024-12-04

## 2024-12-04 PROBLEM — Z28.39 MATERNAL VARICELLA, NON-IMMUNE: Status: ACTIVE | Noted: 2024-12-04

## 2024-12-05 ENCOUNTER — OFFICE VISIT (OUTPATIENT)
Dept: OBSTETRICS AND GYNECOLOGY | Age: 24
End: 2024-12-05
Payer: COMMERCIAL

## 2024-12-05 VITALS
SYSTOLIC BLOOD PRESSURE: 110 MMHG | HEIGHT: 66 IN | BODY MASS INDEX: 24.81 KG/M2 | DIASTOLIC BLOOD PRESSURE: 68 MMHG | WEIGHT: 154.4 LBS

## 2024-12-05 DIAGNOSIS — Z28.39 MATERNAL VARICELLA, NON-IMMUNE: ICD-10-CM

## 2024-12-05 DIAGNOSIS — Q27.9 CONGENITAL MALFORMATION OF PERIPHERAL VASCULAR SYSTEM, UNSPECIFIED: ICD-10-CM

## 2024-12-05 DIAGNOSIS — O09.899 MATERNAL VARICELLA, NON-IMMUNE: ICD-10-CM

## 2024-12-05 DIAGNOSIS — Z3A.01 7 WEEKS GESTATION OF PREGNANCY: Primary | ICD-10-CM

## 2024-12-05 PROBLEM — O09.299 HISTORY OF MISCARRIAGE, CURRENTLY PREGNANT: Status: RESOLVED | Noted: 2024-11-26 | Resolved: 2024-12-05

## 2024-12-05 NOTE — PROGRESS NOTES
[facility]   Obstetrics and Gynecology   New Obstetric Visit    2024    Patient: Mayra Garner          MR#:[id number]    History of Present Illness    Chief Complaint   Patient presents with    Gynecologic Exam     US follow up for fetal viability, pt has concerns of her blood sugar dropping and it making her feel nauseous, dizzy, and sick.       24 y.o. female  at 7w0d presents for NOB visit.  She is doing well today.  Taking prenatal vitamins.  She is her today with FOB Rashad and baby Ar.  [place] is 14mo now and doing great.    Studies reviewed:   Ob Transvaginal (2024 08:44)   US Ob Transvaginal (2024 09:18)   ________________________________________  Patient Active Problem List   Diagnosis    Congenital malformation of peripheral vascular system, unspecified    Lactating mother    Maternal varicella, non-immune    7 weeks gestation of pregnancy     OB History          3    Para   1    Term   1            AB   1    Living   1         SAB   1    IAB        Ectopic        Molar        Multiple   0    Live Births   1                  Social History:  Denies h/o herpes, syphilis, HIV  Denies family history of birth defects and genetic disorders - adopted    Past Medical History:   Diagnosis Date    Trauma     10 years old    Vascular abnormality     Right Arm      Past Surgical History:   Procedure Laterality Date    OTHER SURGICAL HISTORY Right     Arm sclerotherapy x5     Social History     Tobacco Use   Smoking Status Never    Passive exposure: Never   Smokeless Tobacco Never     Family History   Adopted: Yes   Problem Relation Age of Onset    Ovarian cancer Mother     Breast cancer Paternal Grandmother      Prior to Admission medications    Medication Sig Start Date End Date Taking? Authorizing Provider   Cholecalciferol 25 MCG (1000 UT) tablet Take 1 tablet by mouth Daily.   Yes Provider, MD Zeke   prenatal vitamin (prenatal, CLASSIC, vitamin)  "tablet Take  by mouth Daily.   Yes Provider, Zeke, MD     ________________________________________    The following portions of the patient's history were reviewed and updated as appropriate: allergies, current medications, past family history, past medical history, past social history, past surgical history, and problem list.    Review of Systems   All other systems reviewed and are negative.           Objective     /68   Ht 167.6 cm (65.98\")   Wt 70 kg (154 lb 6.4 oz)   LMP 10/16/2024 Comment: still breastfeeding  BMI 24.93 kg/m²    BP Readings from Last 3 Encounters:   12/05/24 110/68   11/26/24 98/64   10/01/24 118/76      Wt Readings from Last 3 Encounters:   12/05/24 70 kg (154 lb 6.4 oz)   11/26/24 71.2 kg (157 lb)   09/18/24 73 kg (161 lb)        BMI: Estimated body mass index is 24.93 kg/m² as calculated from the following:    Height as of this encounter: 167.6 cm (65.98\").    Weight as of this encounter: 70 kg (154 lb 6.4 oz).    Physical Exam  Vitals and nursing note reviewed.   Constitutional:       General: She is not in acute distress.  Pulmonary:      Effort: Pulmonary effort is normal. No respiratory distress.   Neurological:      General: No focal deficit present.      Mental Status: She is alert and oriented to person, place, and time.   Psychiatric:         Mood and Affect: Mood normal.         Behavior: Behavior normal.         Thought Content: Thought content normal.         Judgment: Judgment normal.           Assessment:  Diagnoses and all orders for this visit:    1. 7 weeks gestation of pregnancy (Primary)  Overview:  -PNL: plan for 1hr GTT/CBC/RPR at 24-28wga  -Genetics: discuss screening options at next visit, carrier screen neg, plan for anatomy Us at 18-22 wga  -Pap: NIL 3/2/23  -Imm: RI, VNI, tdap > 27wga  -Contraception: discuss at future visit  -Birthplan: discuss at future visit  -Care coordination: accepts blood transfusions, no latex allergy, call schedule " reviewed        2. Congenital malformation of peripheral vascular system, unspecified  Overview:  -Patient reports vascular malformation in upper right arm.  Present since birth but not genetic.  S/p 5 repairs.  Told it may change with grow with pregnancy.  Unsure of any other details.  -S/p MFM consult, consider lovenox ppx if pain develops but otherwise no intervention  -Plan for serial growth Us, normal at 36 wga      3. Lactating mother    4. Maternal varicella, non-immune  Overview:  H/o varivax, may update postpartum at patient request            Plan:  Return in about 4 weeks (around 1/2/2025) for Next f/u.    I spent 30 minutes caring for Mayra Garner on this date of service. This time includes time spent by me in the following activities: preparing for the visit, reviewing tests, obtaining and/or reviewing a separately obtained history, performing a medically appropriate examination and/or evaluation, counseling and educating the patient/family/caregiver, ordering medications, tests, or procedures and documenting information in the medical record.  This time does NOT include time spent on separately reported services.    Yolanda Sanz MD  12/5/2024 10:41 EST

## 2025-01-02 ENCOUNTER — ROUTINE PRENATAL (OUTPATIENT)
Dept: OBSTETRICS AND GYNECOLOGY | Age: 25
End: 2025-01-02
Payer: COMMERCIAL

## 2025-01-02 VITALS — BODY MASS INDEX: 25.58 KG/M2 | WEIGHT: 158.4 LBS | SYSTOLIC BLOOD PRESSURE: 106 MMHG | DIASTOLIC BLOOD PRESSURE: 60 MMHG

## 2025-01-02 DIAGNOSIS — Z34.01 ENCOUNTER FOR SUPERVISION OF LOW-RISK FIRST PREGNANCY IN FIRST TRIMESTER: ICD-10-CM

## 2025-01-02 DIAGNOSIS — Z13.89 SCREENING FOR BLOOD OR PROTEIN IN URINE: ICD-10-CM

## 2025-01-02 DIAGNOSIS — Q27.9 CONGENITAL MALFORMATION OF PERIPHERAL VASCULAR SYSTEM, UNSPECIFIED: ICD-10-CM

## 2025-01-02 DIAGNOSIS — Z28.39 MATERNAL VARICELLA, NON-IMMUNE: ICD-10-CM

## 2025-01-02 DIAGNOSIS — O09.899 MATERNAL VARICELLA, NON-IMMUNE: ICD-10-CM

## 2025-01-02 DIAGNOSIS — Z3A.11 11 WEEKS GESTATION OF PREGNANCY: Primary | ICD-10-CM

## 2025-01-02 PROBLEM — Z3A.01 7 WEEKS GESTATION OF PREGNANCY: Status: RESOLVED | Noted: 2024-12-05 | Resolved: 2025-01-02

## 2025-01-02 NOTE — PROGRESS NOTES
Chief Complaint   Patient presents with    Routine Prenatal Visit     11w0d, Flu vaccine declined, No problems today       Mayra Garner, a 24 y.o.  at 11w0d, presents for OB follow-up.  She is doing well today.  Denies VB and pain.    Objective  BP Readings from Last 3 Encounters:   25 106/60   24 110/68   24 98/64      Wt Readings from Last 3 Encounters:   25 71.8 kg (158 lb 6.4 oz)   24 70 kg (154 lb 6.4 oz)   24 71.2 kg (157 lb)      Total weight gain this pregnancy: 1.996 kg (4 lb 6.4 oz)    General: Awake, alert, no apparent distress  Respiratory: No increased work of breathing  Abdomen: Fundus soft and nontender, BSUS c/w gross FM and normal FHTs  Extremity: Nontender, no edema    A/P  Diagnoses and all orders for this visit:    1. 11 weeks gestation of pregnancy (Primary)  -     POC Urinalysis Dipstick  -     [facility] Prenatal Test: Chromosomes 13, 18, 21, X & Y: Triploidy 22Q.11.2 Deletion - Blood, Blood, Venous    2. Screening for blood or protein in urine  -     POC Urinalysis Dipstick    3. Congenital malformation of peripheral vascular system, unspecified  Overview:  -Patient reports vascular malformation in upper right arm.  Present since birth but not genetic.  S/p 5 repairs.  Told it may change with grow with pregnancy.  Unsure of any other details.  -S/p MFM consult, consider lovenox ppx if pain develops but otherwise no intervention  -Plan for serial growth Us, normal at 36 wga      4. Lactating mother    5. Maternal varicella, non-immune  Overview:  H/o varivax, may update postpartum at patient request      6. Encounter for supervision of low-risk first pregnancy in first trimester  Overview:  -PNL: plan for 1hr GTT/CBC/RPR at 24-28wga  -Genetics: carrier screen neg, cfDNA today, plan for msAFP > 16 wga, plan for anatomy Us at 18-22 wga  -Pap: NIL 3/2/23  -Imm: RI, VNI, tdap > 27wga  -Contraception: discuss at future visit  -Birthplan: discuss  at future visit  -Care coordination: accepts blood transfusions, no latex allergy, call schedule reviewed            SAB precautions reviewed  Return in about 4 weeks (around 1/30/2025) for Next f/u.    Yolanda Sanz MD

## 2025-01-14 ENCOUNTER — HOSPITAL ENCOUNTER (EMERGENCY)
Facility: HOSPITAL | Age: 25
Discharge: HOME | End: 2025-01-14
Attending: STUDENT IN AN ORGANIZED HEALTH CARE EDUCATION/TRAINING PROGRAM | Admitting: STUDENT IN AN ORGANIZED HEALTH CARE EDUCATION/TRAINING PROGRAM
Payer: COMMERCIAL

## 2025-01-14 ENCOUNTER — APPOINTMENT (OUTPATIENT)
Dept: ULTRASOUND IMAGING | Facility: HOSPITAL | Age: 25
End: 2025-01-14
Payer: COMMERCIAL

## 2025-01-14 ENCOUNTER — PATIENT MESSAGE (OUTPATIENT)
Dept: OBSTETRICS AND GYNECOLOGY | Age: 25
End: 2025-01-14
Payer: COMMERCIAL

## 2025-01-14 ENCOUNTER — TELEPHONE (OUTPATIENT)
Dept: OBSTETRICS AND GYNECOLOGY | Age: 25
End: 2025-01-14
Payer: COMMERCIAL

## 2025-01-14 VITALS
DIASTOLIC BLOOD PRESSURE: 82 MMHG | HEART RATE: 86 BPM | OXYGEN SATURATION: 98 % | TEMPERATURE: 98.9 F | SYSTOLIC BLOOD PRESSURE: 115 MMHG | RESPIRATION RATE: 18 BRPM

## 2025-01-14 DIAGNOSIS — O20.0 THREATENED MISCARRIAGE: ICD-10-CM

## 2025-01-14 DIAGNOSIS — O46.92 VAGINAL BLEEDING IN PREGNANCY, SECOND TRIMESTER: Primary | ICD-10-CM

## 2025-01-14 DIAGNOSIS — R10.2 PELVIC PAIN AFFECTING PREGNANCY IN SECOND TRIMESTER, ANTEPARTUM: ICD-10-CM

## 2025-01-14 DIAGNOSIS — O26.892 PELVIC PAIN AFFECTING PREGNANCY IN SECOND TRIMESTER, ANTEPARTUM: ICD-10-CM

## 2025-01-14 LAB
ABO GROUP BLD: NORMAL
BASOPHILS # BLD AUTO: 0.01 10*3/MM3 (ref 0–0.2)
BASOPHILS NFR BLD AUTO: 0.1 % (ref 0–1.5)
BILIRUB UR QL STRIP: NEGATIVE
BLD GP AB SCN SERPL QL: NEGATIVE
CLARITY UR: CLEAR
COLOR UR: YELLOW
DEPRECATED RDW RBC AUTO: 39.7 FL (ref 37–54)
EOSINOPHIL # BLD AUTO: 0.02 10*3/MM3 (ref 0–0.4)
EOSINOPHIL NFR BLD AUTO: 0.3 % (ref 0.3–6.2)
ERYTHROCYTE [DISTWIDTH] IN BLOOD BY AUTOMATED COUNT: 12.2 % (ref 12.3–15.4)
GLUCOSE UR STRIP-MCNC: NEGATIVE MG/DL
HCG INTACT+B SERPL-ACNC: NORMAL MIU/ML
HCT VFR BLD AUTO: 37.4 % (ref 34–46.6)
HGB BLD-MCNC: 11.8 G/DL (ref 12–15.9)
HGB UR QL STRIP.AUTO: NEGATIVE
HOLD SPECIMEN: NORMAL
HOLD SPECIMEN: NORMAL
IMM GRANULOCYTES # BLD AUTO: 0.01 10*3/MM3 (ref 0–0.05)
IMM GRANULOCYTES NFR BLD AUTO: 0.1 % (ref 0–0.5)
KETONES UR QL STRIP: NEGATIVE
LEUKOCYTE ESTERASE UR QL STRIP.AUTO: NEGATIVE
LYMPHOCYTES # BLD AUTO: 1.96 10*3/MM3 (ref 0.7–3.1)
LYMPHOCYTES NFR BLD AUTO: 28.7 % (ref 19.6–45.3)
MCH RBC QN AUTO: 28.2 PG (ref 26.6–33)
MCHC RBC AUTO-ENTMCNC: 31.6 G/DL (ref 31.5–35.7)
MCV RBC AUTO: 89.5 FL (ref 79–97)
MONOCYTES # BLD AUTO: 0.35 10*3/MM3 (ref 0.1–0.9)
MONOCYTES NFR BLD AUTO: 5.1 % (ref 5–12)
NEUTROPHILS NFR BLD AUTO: 4.48 10*3/MM3 (ref 1.7–7)
NEUTROPHILS NFR BLD AUTO: 65.7 % (ref 42.7–76)
NITRITE UR QL STRIP: NEGATIVE
NRBC BLD AUTO-RTO: 0 /100 WBC (ref 0–0.2)
NUMBER OF DOSES: NORMAL
PH UR STRIP.AUTO: 7 [PH] (ref 5–8)
PLATELET # BLD AUTO: 251 10*3/MM3 (ref 140–450)
PMV BLD AUTO: 8.4 FL (ref 6–12)
PROT UR QL STRIP: NEGATIVE
RBC # BLD AUTO: 4.18 10*6/MM3 (ref 3.77–5.28)
RH BLD: POSITIVE
SP GR UR STRIP: <=1.005 (ref 1–1.03)
UROBILINOGEN UR QL STRIP: NORMAL
WBC NRBC COR # BLD AUTO: 6.83 10*3/MM3 (ref 3.4–10.8)
WHOLE BLOOD HOLD COAG: NORMAL
WHOLE BLOOD HOLD SPECIMEN: NORMAL

## 2025-01-14 PROCEDURE — 86901 BLOOD TYPING SEROLOGIC RH(D): CPT

## 2025-01-14 PROCEDURE — 86850 RBC ANTIBODY SCREEN: CPT

## 2025-01-14 PROCEDURE — 36415 COLL VENOUS BLD VENIPUNCTURE: CPT

## 2025-01-14 PROCEDURE — 99284 EMERGENCY DEPT VISIT MOD MDM: CPT

## 2025-01-14 PROCEDURE — 76815 OB US LIMITED FETUS(S): CPT

## 2025-01-14 PROCEDURE — 81003 URINALYSIS AUTO W/O SCOPE: CPT | Performed by: STUDENT IN AN ORGANIZED HEALTH CARE EDUCATION/TRAINING PROGRAM

## 2025-01-14 PROCEDURE — 76817 TRANSVAGINAL US OBSTETRIC: CPT

## 2025-01-14 PROCEDURE — 84702 CHORIONIC GONADOTROPIN TEST: CPT

## 2025-01-14 PROCEDURE — 86900 BLOOD TYPING SEROLOGIC ABO: CPT

## 2025-01-14 PROCEDURE — 85025 COMPLETE CBC W/AUTO DIFF WBC: CPT

## 2025-01-14 PROCEDURE — 93976 VASCULAR STUDY: CPT

## 2025-01-14 RX ORDER — SODIUM CHLORIDE 0.9 % (FLUSH) 0.9 %
10 SYRINGE (ML) INJECTION AS NEEDED
Status: DISCONTINUED | OUTPATIENT
Start: 2025-01-14 | End: 2025-01-14 | Stop reason: HOSPADM

## 2025-01-14 NOTE — TELEPHONE ENCOUNTER
Pt notified to proceed to ER for evaluation. Pt stated understanding & has no further questions or concerns at this time

## 2025-01-14 NOTE — TELEPHONE ENCOUNTER
Spoke with pt. Pt stating the pain started last night & that she has been having normal bowel movements. Pt stating she just went to the restroom & noticed some pinkish discharge. Pt denies any bright red or heavy bleeding.

## 2025-01-14 NOTE — TELEPHONE ENCOUNTER
Yvon Pt. Pt is 12w5d and c/o persistent pain on the left side of her belly above her pubic bone. She states as time goes on the pain is getting worse which is concerning her. Please advise.

## 2025-01-14 NOTE — ED PROVIDER NOTES
EMERGENCY DEPARTMENT ENCOUNTER  Room Number:  38/38  PCP: Neva Ortiz APRN  Independent Historians: Patient      HPI:  Chief Complaint: had concerns including Vaginal Bleeding - Pregnant and Abdominal Pain.     A complete HPI/ROS/PMH/PSH/SH/FH are unobtainable due to: None    Chronic or social conditions impacting patient care (Social Determinants of Health): None      Context: Mayra Garner is a 24 y.o. female with a medical history of G3, , recent miscarriage 2024 at 7 weeks, subsequent pregnancy starting 2024, who presents to the ED c/o acute abdominal pain and vaginal bleeding in pregnancy.  She is currently 12 weeks and 5 days.  She reports she had cramping lower abdominal pain onset yesterday and vaginal bleeding that started today.  No preceding discharge or urinary symptoms.  No other complaints at this time.  She has seen her OB doctor 3 times during this pregnancy and has had normal ultrasounds previous to this.    Review of prior external notes (non-ED) -and- Review of prior external test results outside of this encounter:  On 24 patient with negative GC/CT /trichomonas/ RPR    Prescription drug monitoring program review:         PAST MEDICAL HISTORY  Active Ambulatory Problems     Diagnosis Date Noted    Congenital malformation of peripheral vascular system, unspecified 2015    Lactating mother 10/26/2023    Maternal varicella, non-immune 2024    Encounter for supervision of low-risk first pregnancy in first trimester 2024     Resolved Ambulatory Problems     Diagnosis Date Noted    Malaise and fatigue 2022    Adenopathy 2022    Pain 2016    FHx: congenital anomaly 2023    39 weeks gestation of pregnancy 2023    Maternal varicella, non-immune 2023    Pregnancy headache in third trimester 2023    UTI (urinary tract infection) during pregnancy 2023    39 weeks gestation of pregnancy 10/02/2023      (normal spontaneous vaginal delivery) 10/03/2023    History of miscarriage, currently pregnant 11/26/2024     Past Medical History:   Diagnosis Date    Trauma     Vascular abnormality          PAST SURGICAL HISTORY  Past Surgical History:   Procedure Laterality Date    OTHER SURGICAL HISTORY Right     Arm sclerotherapy x5         FAMILY HISTORY  Family History   Adopted: Yes   Problem Relation Age of Onset    Ovarian cancer Mother     Breast cancer Paternal Grandmother          SOCIAL HISTORY  Social History     Socioeconomic History    Marital status: Single    Number of children: 0    Highest education level: Some college, no degree   Tobacco Use    Smoking status: Never     Passive exposure: Never    Smokeless tobacco: Never   Vaping Use    Vaping status: Never Used   Substance and Sexual Activity    Alcohol use: Not Currently    Drug use: Never    Sexual activity: Yes     Partners: Male     Birth control/protection: None, Pill         ALLERGIES  Iodine and Shellfish-derived products      REVIEW OF SYSTEMS  Review of Systems  Included in HPI  All systems reviewed and negative except for those discussed in HPI.      PHYSICAL EXAM    I have reviewed the triage vital signs and nursing notes.    ED Triage Vitals   Temp Heart Rate Resp BP SpO2   01/14/25 1453 01/14/25 1453 01/14/25 1453 01/14/25 1455 01/14/25 1453   98.9 °F (37.2 °C) 95 18 115/67 98 %      Temp src Heart Rate Source Patient Position BP Location FiO2 (%)   01/14/25 1453 -- -- -- --   Tympanic           Physical Exam  GENERAL: alert, no acute distress  SKIN: Warm, dry  HENT: Normocephalic, atraumatic  EYES: no scleral icterus  CV: regular rhythm, regular rate  RESPIRATORY: normal effort, lungs clear  ABDOMEN: soft, mild lower abdominal tenderness to palpation without rebound  MUSCULOSKELETAL: no deformity  NEURO: alert, moves all extremities, follows commands            LAB RESULTS  Recent Results (from the past 24 hours)   hCG, Quantitative, Pregnancy     Collection Time: 25  3:03 PM    Specimen: Arm, Right; Blood   Result Value Ref Range    HCG Quantitative 69,567.00 mIU/mL    RhIg Evaluation    Collection Time: 25  3:03 PM    Specimen: Arm, Right; Blood   Result Value Ref Range    ABO Type A     RH type Positive     Antibody Screen Negative    Doses of Rh Immune Globulin    Collection Time: 25  3:03 PM    Specimen: Arm, Right; Blood   Result Value Ref Range    Number of Doses       RhIg is not indicated due to the patient's Rh status   Green Top (Gel)    Collection Time: 25  3:03 PM   Result Value Ref Range    Extra Tube Hold for add-ons.    Lavender Top    Collection Time: 25  3:03 PM   Result Value Ref Range    Extra Tube hold for add-on    Gold Top - SST    Collection Time: 25  3:03 PM   Result Value Ref Range    Extra Tube Hold for add-ons.    Light Blue Top    Collection Time: 25  3:03 PM   Result Value Ref Range    Extra Tube Hold for add-ons.    CBC Auto Differential    Collection Time: 25  3:03 PM    Specimen: Arm, Right; Blood   Result Value Ref Range    WBC 6.83 3.40 - 10.80 10*3/mm3    RBC 4.18 3.77 - 5.28 10*6/mm3    Hemoglobin 11.8 (L) 12.0 - 15.9 g/dL    Hematocrit 37.4 34.0 - 46.6 %    MCV 89.5 79.0 - 97.0 fL    MCH 28.2 26.6 - 33.0 pg    MCHC 31.6 31.5 - 35.7 g/dL    RDW 12.2 (L) 12.3 - 15.4 %    RDW-SD 39.7 37.0 - 54.0 fl    MPV 8.4 6.0 - 12.0 fL    Platelets 251 140 - 450 10*3/mm3    Neutrophil % 65.7 42.7 - 76.0 %    Lymphocyte % 28.7 19.6 - 45.3 %    Monocyte % 5.1 5.0 - 12.0 %    Eosinophil % 0.3 0.3 - 6.2 %    Basophil % 0.1 0.0 - 1.5 %    Immature Grans % 0.1 0.0 - 0.5 %    Neutrophils, Absolute 4.48 1.70 - 7.00 10*3/mm3    Lymphocytes, Absolute 1.96 0.70 - 3.10 10*3/mm3    Monocytes, Absolute 0.35 0.10 - 0.90 10*3/mm3    Eosinophils, Absolute 0.02 0.00 - 0.40 10*3/mm3    Basophils, Absolute 0.01 0.00 - 0.20 10*3/mm3    Immature Grans, Absolute 0.01 0.00 - 0.05 10*3/mm3    nRBC 0.0  0.0 - 0.2 /100 WBC   Urinalysis With Microscopic If Indicated (No Culture) - Urine, Clean Catch    Collection Time: 01/14/25  4:27 PM    Specimen: Urine, Clean Catch   Result Value Ref Range    Color, UA Yellow Yellow, Straw    Appearance, UA Clear Clear    pH, UA 7.0 5.0 - 8.0    Specific Gravity, UA <=1.005 1.005 - 1.030    Glucose, UA Negative Negative    Ketones, UA Negative Negative    Bilirubin, UA Negative Negative    Blood, UA Negative Negative    Protein, UA Negative Negative    Leuk Esterase, UA Negative Negative    Nitrite, UA Negative Negative    Urobilinogen, UA 0.2 E.U./dL 0.2 - 1.0 E.U./dL         RADIOLOGY  US Ob Limited 1 + Fetuses, US Ob Transvaginal, US Testicular or Ovarian Vascular Limited    Result Date: 1/14/2025  FIRST TRIMESTER OB ULTRASOUND  COMPARISON: Obstetric ultrasound 12/5/2024  HISTORY: Abdominal pain and vaginal bleeding. 12 weeks and 5 days pregnant.  TECHNIQUE: Grayscale, color Doppler and spectral Doppler evaluation of the pelvis with transabdominal and transvaginal probes.  FINDINGS:  The uterus measures approximately 15.2 cm in length. Cervix length measures 5.6 cm.  A single gestational sac is seen within the uterus.  The sac contains both a yolk sac and a fetus.  Head circumference 7.5 cm, 13 weeks and 1 day Biparietal diameter 2 cm, 13 weeks and 1 day Abdominal circumference 6.3 cm, 13 weeks Femur length 1.1 cm, 13 weeks and 1 day  Cardiac motion is present with a regular heart rate of 169 bpm.  Right ovary was not visualized likely secondary to available acoustic windows.  Left ovary measures 2.2 x 1.6 x 1.2 cm. Arterial and venous spectral Doppler waveforms present within the left ovary.  No free fluid is seen in the cul-de-sac.  Placenta and amniotic fluid are present.        1. Single viable intrauterine pregnancy measuring 13 weeks and 1 day. 2. Normal sonographic appearance of the left ovary. Right ovary was not visualized likely secondary to available acoustic  windows.     This report was finalized on 2025 6:55 PM by Dr. Servando Mendoza M.D on Workstation: [id number]         MEDICATIONS GIVEN IN ER  Medications   sodium chloride 0.9 % flush 10 mL (has no administration in time range)         ORDERS PLACED DURING THIS VISIT:  Orders Placed This Encounter   Procedures    US Ob Limited 1 + Fetuses    US Ob Transvaginal    US Testicular or Ovarian Vascular Limited    [place] Draw    hCG, Quantitative, Pregnancy    CBC Auto Differential    Urinalysis With Microscopic If Indicated (No Culture) - Urine, Clean Catch    NPO Diet NPO Type: Strict NPO    Undress & Gown    Vital Signs    Supplies To Bedside - Notify MD When Ready- Pelvic Cart / Set Up     RhIg Evaluation    Doses of Rh Immune Globulin    Insert Peripheral IV    CBC & Differential    Green Top (Gel)    Lavender Top    Gold Top - SST    Light Blue Top         OUTPATIENT MEDICATION MANAGEMENT:  Current Facility-Administered Medications Ordered in Epic   Medication Dose Route Frequency Provider Last Rate Last Admin    sodium chloride 0.9 % flush 10 mL  10 mL Intravenous PRN Sha Mandujano MD         Current Outpatient Medications Ordered in Epic   Medication Sig Dispense Refill    Cholecalciferol 25 MCG (1000 UT) tablet Take 1 tablet by mouth Daily.      prenatal vitamin (prenatal, CLASSIC, vitamin) tablet Take  by mouth Daily.           PROCEDURES  Procedures            PROGRESS, DATA ANALYSIS, CONSULTS, AND MEDICAL DECISION MAKING  All labs have been independently interpreted by me.  All radiology studies have been reviewed by me. All EKG's have been independently viewed and interpreted by me.  Discussion below represents my analysis of pertinent findings related to patient's condition, differential diagnosis, treatment plan and final disposition.    Differential diagnosis includes but is not limited to threatened AB, incomplete or missed AB, subchorionic hemorrhage, anemia.    Clinical Scores:                                        ED Course as of 25   1616 24-year-old female, -1-0-1, presents to the ED for evaluation of abdominal pain and vaginal bleeding.  She is approximate 12 weeks gestational age.  She reports that she had a miscarriage and September, 7 weeks.  She then became pregnant in October.  Reports cramping abdominal pain yesterday, with vaginal bleeding onset today.  No urinary symptoms or preceding vaginal discharge.    On exam patient with mild lower abdominal tenderness to palpation    Will obtain labs, type and screen, hCG, urine, ultrasound.  Patient is agreeable to plan [DN]   1617 RH type: Positive [DN]   1617 HCG Quantitative: 69,567.00 [DN]   1617 Hemoglobin(!): 11.8 [DN]   1638 Urinalysis With Microscopic If Indicated (No Culture) - Urine, Clean Catch  bland [DN]   1645 On 24 patient with negative GC/CT /trichomonas/ RPR, will not repeat at this time.  Patient is agreeable [DN]   1847 US Ob Limited 1 + Fetuses  I reviewed ultrasound images, live IUP, fetal heart tones 169, interpreted by self  I reviewed radiologist rotation of the ultrasound images [DN]   191 I discussed results w/ pt and fiance at bedside, agreeable w/ plan for d/c and OB f/u [DN]      ED Course User Index  [DN] Sha Mandujano MD             AS OF 19:20 EST VITALS:    BP - 115/82  HR - 86  TEMP - 98.9 °F (37.2 °C) (Tympanic)  O2 SATS - 98%    COMPLEXITY OF CARE  Admission was considered but after careful review of the patient's presentation, physical examination, diagnostic results, and response to treatment the patient may be safely discharged with outpatient follow-up.      DIAGNOSIS  Final diagnoses:   Vaginal bleeding in pregnancy, second trimester   Pelvic pain affecting pregnancy in second trimester, antepartum   Threatened miscarriage         DISPOSITION  ED Disposition       ED Disposition   Discharge    Condition   Stable    Comment   --                Please note that  portions of this document were completed with a voice recognition program.    Note Disclaimer: At [facility], we believe that sharing information builds trust and better relationships. You are receiving this note because you recently visited [facility]. It is possible you will see health information before a provider has talked with you about it. This kind of information can be easy to misunderstand. To help you fully understand what it means for your health, we urge you to discuss this note with your provider.         Sha Mandujano MD  01/14/25 2961       Sha Mandujano MD  01/14/25 7351

## 2025-01-30 ENCOUNTER — ROUTINE PRENATAL (OUTPATIENT)
Dept: OBSTETRICS AND GYNECOLOGY | Age: 25
End: 2025-01-30
Payer: COMMERCIAL

## 2025-01-30 VITALS — SYSTOLIC BLOOD PRESSURE: 108 MMHG | DIASTOLIC BLOOD PRESSURE: 62 MMHG | BODY MASS INDEX: 25.22 KG/M2 | WEIGHT: 156.2 LBS

## 2025-01-30 DIAGNOSIS — Z28.39 MATERNAL VARICELLA, NON-IMMUNE: ICD-10-CM

## 2025-01-30 DIAGNOSIS — Z3A.15 15 WEEKS GESTATION OF PREGNANCY: Primary | ICD-10-CM

## 2025-01-30 DIAGNOSIS — O09.899 MATERNAL VARICELLA, NON-IMMUNE: ICD-10-CM

## 2025-01-30 DIAGNOSIS — Z13.89 SCREENING FOR BLOOD OR PROTEIN IN URINE: ICD-10-CM

## 2025-01-30 DIAGNOSIS — Z34.01 ENCOUNTER FOR SUPERVISION OF LOW-RISK FIRST PREGNANCY IN FIRST TRIMESTER: ICD-10-CM

## 2025-01-30 DIAGNOSIS — Q27.9 CONGENITAL MALFORMATION OF PERIPHERAL VASCULAR SYSTEM, UNSPECIFIED: ICD-10-CM

## 2025-01-30 NOTE — PROGRESS NOTES
Chief Complaint   Patient presents with    Routine Prenatal Visit     15w0d, No problems today       Mayra Garner, a 24 y.o.  at 15w0d, presents for OB follow-up.  She is doing well today.  Denies pain.  She has had intermittent spotting, mostly after intercourse.    Objective  BP Readings from Last 3 Encounters:   25 108/62   25 115/82   25 106/60      Wt Readings from Last 3 Encounters:   25 70.9 kg (156 lb 3.2 oz)   25 71.8 kg (158 lb 6.4 oz)   24 70 kg (154 lb 6.4 oz)      Total weight gain this pregnancy: 0.998 kg (2 lb 3.2 oz)    General: Awake, alert, no apparent distress  Respiratory: No increased work of breathing  Abdomen: Fundus soft and nontender  Extremity: Nontender, no edema    A/P  Diagnoses and all orders for this visit:    1. 15 weeks gestation of pregnancy (Primary)  -     POC Urinalysis Dipstick    2. Screening for blood or protein in urine  -     POC Urinalysis Dipstick    3. Congenital malformation of peripheral vascular system, unspecified  Overview:  -Patient reports vascular malformation in upper right arm.  Present since birth but not genetic.  S/p 5 repairs.  Told it may change with grow with pregnancy.  Unsure of any other details.  -S/p MFM consult, consider lovenox ppx if pain develops but otherwise no intervention  -Plan for serial growth Us, normal at 36 wga      4. Lactating mother    5. Maternal varicella, non-immune  Overview:  H/o varivax, may update postpartum at patient request      6. Encounter for supervision of low-risk first pregnancy in first trimester  Overview:  -PNL: plan for 1hr GTT/CBC/RPR at 24-28wga  -Genetics: carrier screen/cfDNA neg, plan for msAFP > 16 wga, plan for anatomy Us at next visit  -Pap: NIL 3/2/23  -Imm: RI, VNI, tdap > 27wga  -Contraception: discuss at future visit  -Birthplan: discuss at future visit  -Care coordination: accepts blood transfusions, no latex allergy, call schedule reviewed            SAB  precautions reviewed  Return in about 4 weeks (around 2/27/2025) for Next f/u with anatomy Us.    Yolanda Sanz MD

## 2025-02-15 ENCOUNTER — APPOINTMENT (OUTPATIENT)
Dept: GENERAL RADIOLOGY | Facility: HOSPITAL | Age: 25
End: 2025-02-15
Payer: COMMERCIAL

## 2025-02-15 ENCOUNTER — HOSPITAL ENCOUNTER (EMERGENCY)
Facility: HOSPITAL | Age: 25
Discharge: HOME | End: 2025-02-15
Attending: EMERGENCY MEDICINE
Payer: COMMERCIAL

## 2025-02-15 VITALS
DIASTOLIC BLOOD PRESSURE: 95 MMHG | SYSTOLIC BLOOD PRESSURE: 129 MMHG | OXYGEN SATURATION: 100 % | HEIGHT: 66 IN | TEMPERATURE: 98.8 F | BODY MASS INDEX: 25.07 KG/M2 | HEART RATE: 97 BPM | RESPIRATION RATE: 16 BRPM | WEIGHT: 156 LBS

## 2025-02-15 DIAGNOSIS — Z3A.17 17 WEEKS GESTATION OF PREGNANCY: ICD-10-CM

## 2025-02-15 DIAGNOSIS — R07.89 ATYPICAL CHEST PAIN: Primary | ICD-10-CM

## 2025-02-15 LAB
ANION GAP SERPL CALCULATED.3IONS-SCNC: 8 MMOL/L (ref 5–15)
BASOPHILS # BLD AUTO: 0.02 10*3/MM3 (ref 0–0.2)
BASOPHILS NFR BLD AUTO: 0.3 % (ref 0–1.5)
BUN SERPL-MCNC: 7 MG/DL (ref 6–20)
BUN/CREAT SERPL: 14.3 (ref 7–25)
CALCIUM SPEC-SCNC: 8.9 MG/DL (ref 8.6–10.5)
CHLORIDE SERPL-SCNC: 106 MMOL/L (ref 98–107)
CO2 SERPL-SCNC: 24 MMOL/L (ref 22–29)
CREAT SERPL-MCNC: 0.49 MG/DL (ref 0.57–1)
D DIMER PPP FEU-MCNC: 0.33 MCGFEU/ML (ref 0–0.5)
DEPRECATED RDW RBC AUTO: 40 FL (ref 37–54)
EGFRCR SERPLBLD CKD-EPI 2021: 136 ML/MIN/1.73
EOSINOPHIL # BLD AUTO: 0.04 10*3/MM3 (ref 0–0.4)
EOSINOPHIL NFR BLD AUTO: 0.5 % (ref 0.3–6.2)
ERYTHROCYTE [DISTWIDTH] IN BLOOD BY AUTOMATED COUNT: 12.4 % (ref 12.3–15.4)
GLUCOSE SERPL-MCNC: 82 MG/DL (ref 65–99)
HCT VFR BLD AUTO: 34 % (ref 34–46.6)
HGB BLD-MCNC: 11.3 G/DL (ref 12–15.9)
IMM GRANULOCYTES # BLD AUTO: 0.02 10*3/MM3 (ref 0–0.05)
IMM GRANULOCYTES NFR BLD AUTO: 0.3 % (ref 0–0.5)
LYMPHOCYTES # BLD AUTO: 2.18 10*3/MM3 (ref 0.7–3.1)
LYMPHOCYTES NFR BLD AUTO: 28.9 % (ref 19.6–45.3)
MCH RBC QN AUTO: 29.4 PG (ref 26.6–33)
MCHC RBC AUTO-ENTMCNC: 33.2 G/DL (ref 31.5–35.7)
MCV RBC AUTO: 88.5 FL (ref 79–97)
MONOCYTES # BLD AUTO: 0.48 10*3/MM3 (ref 0.1–0.9)
MONOCYTES NFR BLD AUTO: 6.4 % (ref 5–12)
NEUTROPHILS NFR BLD AUTO: 4.8 10*3/MM3 (ref 1.7–7)
NEUTROPHILS NFR BLD AUTO: 63.6 % (ref 42.7–76)
NRBC BLD AUTO-RTO: 0 /100 WBC (ref 0–0.2)
PLATELET # BLD AUTO: 271 10*3/MM3 (ref 140–450)
PMV BLD AUTO: 8.5 FL (ref 6–12)
POTASSIUM SERPL-SCNC: 3.3 MMOL/L (ref 3.5–5.2)
RBC # BLD AUTO: 3.84 10*6/MM3 (ref 3.77–5.28)
SODIUM SERPL-SCNC: 138 MMOL/L (ref 136–145)
TROPONIN T SERPL HS-MCNC: <6 NG/L
WBC NRBC COR # BLD AUTO: 7.54 10*3/MM3 (ref 3.4–10.8)

## 2025-02-15 PROCEDURE — 80048 BASIC METABOLIC PNL TOTAL CA: CPT | Performed by: EMERGENCY MEDICINE

## 2025-02-15 PROCEDURE — 85379 FIBRIN DEGRADATION QUANT: CPT | Performed by: EMERGENCY MEDICINE

## 2025-02-15 PROCEDURE — 85025 COMPLETE CBC W/AUTO DIFF WBC: CPT | Performed by: EMERGENCY MEDICINE

## 2025-02-15 PROCEDURE — 93005 ELECTROCARDIOGRAM TRACING: CPT | Performed by: EMERGENCY MEDICINE

## 2025-02-15 PROCEDURE — 71045 X-RAY EXAM CHEST 1 VIEW: CPT

## 2025-02-15 PROCEDURE — 99284 EMERGENCY DEPT VISIT MOD MDM: CPT

## 2025-02-15 PROCEDURE — 84484 ASSAY OF TROPONIN QUANT: CPT | Performed by: EMERGENCY MEDICINE

## 2025-02-15 PROCEDURE — 93010 ELECTROCARDIOGRAM REPORT: CPT | Performed by: INTERNAL MEDICINE

## 2025-02-15 RX ORDER — SODIUM CHLORIDE 0.9 % (FLUSH) 0.9 %
10 SYRINGE (ML) INJECTION AS NEEDED
Status: DISCONTINUED | OUTPATIENT
Start: 2025-02-15 | End: 2025-02-16 | Stop reason: HOSPADM

## 2025-02-16 LAB
QT INTERVAL: 380 MS
QTC INTERVAL: 438 MS

## 2025-02-16 NOTE — DISCHARGE INSTRUCTIONS
Take Tylenol as needed.  Return to the emergency department for worsening/persistent symptoms, trouble breathing, fever, or other concern.  Follow-up with your primary care provider and/or your obstetrician.

## 2025-02-16 NOTE — ED TRIAGE NOTES
Pt ambulatory to triage from home with c/o left chest pain that is worse with movement or inspiration.  Pt states pain is dull, but becomes sharp when she takes a deep breath.  Pt is pregnant, wheels to 17 weeks 2 day, [id number], ob is Sanz. No pregnancy related complaints.

## 2025-02-16 NOTE — ED PROVIDER NOTES
EMERGENCY DEPARTMENT ENCOUNTER    Room Number:    PCP: Neva Ortiz APRN  Historian: Patient    I initially evaluated the patient at     HPI:  Chief Complaint: Left-sided chest pain  A complete HPI/ROS/PMH/PSH/SH/FH are unobtainable due to: Nothing  Context: Mayra Garner is a 24 y.o.  female, who is currently 17 weeks 4 days pregnant, who presents to the ED c/o acute chest pain.  Patient began having sharp pain beneath her left breast about an hour and a half ago.  Pain is intermittent and worse with taking deep breaths and with movement.  Pain occasionally radiates into the left upper chest.  She denies recent injury, cough, fever, shortness of breath, palpitations, dizziness, syncope, abdominal pain, vaginal bleeding, leg pain, or leg swelling.  Denies history of PE/DVT.            PAST MEDICAL HISTORY  Active Ambulatory Problems     Diagnosis Date Noted    Congenital malformation of peripheral vascular system, unspecified 2015    Lactating mother 10/26/2023    Maternal varicella, non-immune 2024    Encounter for supervision of low-risk first pregnancy in first trimester 2024     Resolved Ambulatory Problems     Diagnosis Date Noted    Malaise and fatigue 2022    Adenopathy 2022    Pain 2016    FHx: congenital anomaly 2023    39 weeks gestation of pregnancy 2023    Maternal varicella, non-immune 2023    Pregnancy headache in third trimester 2023    UTI (urinary tract infection) during pregnancy 2023    39 weeks gestation of pregnancy 10/02/2023     (normal spontaneous vaginal delivery) 10/03/2023    History of miscarriage, currently pregnant 2024     Past Medical History:   Diagnosis Date    Trauma     Vascular abnormality          PAST SURGICAL HISTORY  Past Surgical History:   Procedure Laterality Date    OTHER SURGICAL HISTORY Right     Arm sclerotherapy x5         FAMILY HISTORY  Family History   Adopted: Yes    Problem Relation Age of Onset    Ovarian cancer Mother     Breast cancer Paternal Grandmother          SOCIAL HISTORY  Social History     Socioeconomic History    Marital status: Single    Number of children: 0    Highest education level: Some college, no degree   Tobacco Use    Smoking status: Never     Passive exposure: Never    Smokeless tobacco: Never   Vaping Use    Vaping status: Never Used   Substance and Sexual Activity    Alcohol use: Not Currently    Drug use: Never    Sexual activity: Yes     Partners: Male     Birth control/protection: None, Pill         ALLERGIES  Iodine and Shellfish-derived products    REVIEW OF SYSTEMS  Review of Systems  Included in HPI  All systems reviewed and negative except for those discussed in HPI.      PHYSICAL EXAM  ED Triage Vitals [02/15/25 2102]   Temp Heart Rate Resp BP SpO2   98.8 °F (37.1 °C) 97 16 129/95 100 %      Temp src Heart Rate Source Patient Position BP Location FiO2 (%)   Oral Monitor Sitting Right arm --       Physical Exam      GENERAL: Awake, alert, oriented x 3.  Well-developed, well-nourished gravid female.  Resting comfortably in no acute distress  HENT: NCAT, nares patent  EYES: no scleral icterus  CV: regular rhythm, normal rate  RESPIRATORY: normal effort, clear to auscultation bilaterally  ABDOMEN: soft, gravid, nontender  MUSCULOSKELETAL: Chest wall is nontender.  Pain is reproducible with movement of the trunk.  Extremities are nontender with full range of motion.  No calf tenderness.  No pedal edema  NEURO: Speech is normal.  No facial droop.  PSYCH:  calm, cooperative  SKIN: warm, dry    Vital signs and nursing notes reviewed.          LAB RESULTS  Recent Results (from the past 24 hours)   Basic Metabolic Panel    Collection Time: 02/15/25  9:28 PM    Specimen: Blood   Result Value Ref Range    Glucose 82 65 - 99 mg/dL    BUN 7 6 - 20 mg/dL    Creatinine 0.49 (L) 0.57 - 1.00 mg/dL    Sodium 138 136 - 145 mmol/L    Potassium 3.3 (L) 3.5 - 5.2  mmol/L    Chloride 106 98 - 107 mmol/L    CO2 24.0 22.0 - 29.0 mmol/L    Calcium 8.9 8.6 - 10.5 mg/dL    BUN/Creatinine Ratio 14.3 7.0 - 25.0    Anion Gap 8.0 5.0 - 15.0 mmol/L    eGFR 136.0 >60.0 mL/min/1.73   D-dimer, Quantitative    Collection Time: 02/15/25  9:28 PM    Specimen: Blood   Result Value Ref Range    D-Dimer, Quantitative 0.33 0.00 - 0.50 MCGFEU/mL   High Sensitivity Troponin T    Collection Time: 02/15/25  9:28 PM    Specimen: Blood   Result Value Ref Range    HS Troponin T <6 <14 ng/L   CBC Auto Differential    Collection Time: 02/15/25  9:28 PM    Specimen: Blood   Result Value Ref Range    WBC 7.54 3.40 - 10.80 10*3/mm3    RBC 3.84 3.77 - 5.28 10*6/mm3    Hemoglobin 11.3 (L) 12.0 - 15.9 g/dL    Hematocrit 34.0 34.0 - 46.6 %    MCV 88.5 79.0 - 97.0 fL    MCH 29.4 26.6 - 33.0 pg    MCHC 33.2 31.5 - 35.7 g/dL    RDW 12.4 12.3 - 15.4 %    RDW-SD 40.0 37.0 - 54.0 fl    MPV 8.5 6.0 - 12.0 fL    Platelets 271 140 - 450 10*3/mm3    Neutrophil % 63.6 42.7 - 76.0 %    Lymphocyte % 28.9 19.6 - 45.3 %    Monocyte % 6.4 5.0 - 12.0 %    Eosinophil % 0.5 0.3 - 6.2 %    Basophil % 0.3 0.0 - 1.5 %    Immature Grans % 0.3 0.0 - 0.5 %    Neutrophils, Absolute 4.80 1.70 - 7.00 10*3/mm3    Lymphocytes, Absolute 2.18 0.70 - 3.10 10*3/mm3    Monocytes, Absolute 0.48 0.10 - 0.90 10*3/mm3    Eosinophils, Absolute 0.04 0.00 - 0.40 10*3/mm3    Basophils, Absolute 0.02 0.00 - 0.20 10*3/mm3    Immature Grans, Absolute 0.02 0.00 - 0.05 10*3/mm3    nRBC 0.0 0.0 - 0.2 /100 WBC   ECG 12 Lead Chest Pain    Collection Time: 02/15/25  9:39 PM   Result Value Ref Range    QT Interval 380 ms    QTC Interval 438 ms       Ordered the above labs and reviewed the results.        RADIOLOGY  XR Chest 1 View    Result Date: 2/15/2025  SINGLE VIEW OF THE CHEST  HISTORY: Left-sided chest pain  COMPARISON: October 20, 2023  FINDINGS: Heart size is within normal limits. No pneumothorax or pleural effusion is seen. No acute infiltrates are  identified.      No acute findings.  This report was finalized on 2/15/2025 10:25 PM by Dr. Kinjal Schuler M.D on Workstation: [id number]       Ordered the above noted radiological studies. Reviewed by me in PACS.            PROCEDURES  Procedures      OUTPATIENT MEDICATION MANAGEMENT:  Current Facility-Administered Medications Ordered in Epic   Medication Dose Route Frequency Provider Last Rate Last Admin    sodium chloride 0.9 % flush 10 mL  10 mL Intravenous PRN Van Samuel MD         Current Outpatient Medications Ordered in Epic   Medication Sig Dispense Refill    Cholecalciferol 25 MCG (1000 UT) tablet Take 1 tablet by mouth Daily. (Patient not taking: Reported on 2025)      prenatal vitamin (prenatal, CLASSIC, vitamin) tablet Take  by mouth Daily.             MEDICATIONS GIVEN IN ER  Medications   sodium chloride 0.9 % flush 10 mL (has no administration in time range)                   MEDICAL DECISION MAKING, PROGRESS, and CONSULTS    All labs have been independently reviewed by me.  All radiology studies have been reviewed by me and I have also reviewed the radiology report.   EKG's independently viewed and interpreted by me.  Discussion below represents my analysis of pertinent findings related to patient's condition, differential diagnosis, treatment plan and final disposition.      Additional sources:    - Discussed/ obtained information from independent historians: None    - External (non-ED) record review: Patient saw Dr. Sanz, her obstetrician, on 2025 for routine prenatal visit.  She was 15 weeks and 0 days at that time.  She is -0-1-1.  Pelvic ultrasound done on 2025 showed a single viable IUP measuring 13 weeks and 1 day    -Prescription drug monitoring program review:     N/A    - Chronic or social conditions impacting patient care (Social Determinants of Health): None          Orders placed during this visit:  Orders Placed This Encounter   Procedures    XR Chest  1 View    Basic Metabolic Panel    D-dimer, Quantitative    High Sensitivity Troponin T    CBC Auto Differential    ECG 12 Lead Chest Pain    Insert Peripheral IV    CBC & Differential         Additional orders considered but not ordered:          Differential diagnosis includes, but is not limited to:    DDx includes but is not limited to acute coronary syndrome, pulmonary embolism, thoracic aortic dissection, pneumonia, pneumothorax, musculoskeletal pain, GERD or esophageal spasm, anxiety, myocarditis/pericarditis, esophageal rupture, pancreatitis.     HEART Score: 0            Independent interpretation of labs, radiology studies, and discussions with consultants:  ED Course as of 02/15/25 2237   Sat Feb 15, 2025   2109 BP: 129/95 [WH]   2109 Temp: 98.8 °F (37.1 °C) [WH]   2110 Heart Rate: 97 [WH]   2110 Resp: 16 [WH]   2110 SpO2: 100 % [WH]   2110 Device (Oxygen Therapy): room air [WH]   2143 EKG personally interpreted by me at 2142.  My personal interpretation is:          EKG time: 2139  Rhythm/Rate: Sinus rhythm, rate 80  P waves and WI: Normal  QRS, axis: Normal  ST and T waves: Normal    Interpreted Contemporaneously by me, independently viewed  EKG is not significantly changed compared to prior EKG done on 10/9/2023   [WH]   2147 Chest x-ray personally interpreted by me.  My personal interpretation is: Heart size is normal.  Lungs are clear.  No pleural effusion. [WH]   2212 D-Dimer, Quant: 0.33 [WH]   2212 HS Troponin T: <6 [WH]   2212 Per the YEARS algorithm, PE is excluded [WH]   2224 Patient is resting her breathing comfortably.  Vital signs are normal.  Test results and diagnoses were discussed with her.  She was advised to take Tylenol as needed.  Return precautions were discussed.  All questions were answered. [WH]   2235 MDM: Patient presented to the ED with intermittent sharp left-sided chest pain.  She is currently 17 weeks pregnant.  She was not febrile, tachypneic, tachycardic, or hypoxic.   D-dimer was negative.  PE was excluded years algorithm.  Chest x-ray and labs were unremarkable.  Chest pain was atypical.  Heart score 0.  Pain may be musculoskeletal or due to pleurisy. [WH]      ED Course User Index  [WH] Van Samuel MD         COMPLEXITY OF CARE        DIAGNOSIS  Final diagnoses:   Atypical chest pain   17 weeks gestation of pregnancy         DISPOSITION  DISCHARGE    Patient discharged in stable condition.    Reviewed implications of results, diagnosis, meds, responsibility to follow up, warning signs and symptoms of possible worsening, potential complications and reasons to return to ER, including worsening/persistent symptoms or other concern.    Patient/Family voiced understanding of above instructions.    Discussed plan for discharge, as there is no emergent indication for admission. Patient referred to primary care provider for BP management due to today's BP. Pt/family is agreeable and understands need for follow up and repeat testing.  Pt is aware that discharge does not mean that nothing is wrong but it indicates no emergency is present that requires admission and they must continue care with follow-up as given below or physician of their choice.     FOLLOW-UP  Neva Ortiz, APRN  [address] [id number] [phone]               Medication List      No changes were made to your prescriptions during this visit.                   Latest Documented Vital Signs:  AS OF 22:37 EST VITALS:    BP - 129/95  HR - 97  TEMP - 98.8 °F (37.1 °C) (Oral)  O2 SATS - 100%            --    Please note that portions of this were completed with a voice recognition program.       Note Disclaimer: At [facility], we believe that sharing information builds trust and better relationships. You are receiving this note because you are receiving care at [facility] or recently visited. It is possible you will see health information before a provider has talked with you about it.  This kind of information can be easy to misunderstand. To help you fully understand what it means for your health, we urge you to discuss this note with your provider.             Van Samuel MD  02/15/25 1575

## 2025-02-19 ENCOUNTER — HOSPITAL ENCOUNTER (OUTPATIENT)
Facility: HOSPITAL | Age: 25
Discharge: HOME | End: 2025-02-19
Attending: EMERGENCY MEDICINE | Admitting: EMERGENCY MEDICINE
Payer: COMMERCIAL

## 2025-02-19 VITALS
WEIGHT: 158 LBS | SYSTOLIC BLOOD PRESSURE: 113 MMHG | TEMPERATURE: 98.5 F | DIASTOLIC BLOOD PRESSURE: 80 MMHG | OXYGEN SATURATION: 100 % | BODY MASS INDEX: 25.39 KG/M2 | RESPIRATION RATE: 17 BRPM | HEIGHT: 66 IN | HEART RATE: 82 BPM

## 2025-02-19 DIAGNOSIS — R09.81 NASAL CONGESTION: Primary | ICD-10-CM

## 2025-02-19 DIAGNOSIS — J06.9 UPPER RESPIRATORY TRACT INFECTION, UNSPECIFIED TYPE: ICD-10-CM

## 2025-02-19 DIAGNOSIS — J02.9 SORE THROAT: ICD-10-CM

## 2025-02-19 LAB
FLUAV SUBTYP SPEC NAA+PROBE: NOT DETECTED
FLUBV RNA ISLT QL NAA+PROBE: NOT DETECTED
SARS-COV-2 RNA RESP QL NAA+PROBE: NOT DETECTED
STREP A PCR: NOT DETECTED

## 2025-02-19 PROCEDURE — G0463 HOSPITAL OUTPT CLINIC VISIT: HCPCS | Performed by: PHYSICIAN ASSISTANT

## 2025-02-19 PROCEDURE — 87636 SARSCOV2 & INF A&B AMP PRB: CPT | Performed by: EMERGENCY MEDICINE

## 2025-02-19 PROCEDURE — 87651 STREP A DNA AMP PROBE: CPT | Performed by: PHYSICIAN ASSISTANT

## 2025-02-19 NOTE — DISCHARGE INSTRUCTIONS
You tested negative for covid, flu, and strep throat.   You may use over the counter flonase to help with your nasal congestion.     I have provided a list that may help your sore throat.   Acetaminophen (Tylenol) up to 3,000 mg in 24 hours.  Antihistamines for postnasal drip.  Sprays or lozenges containing benzocaine to numb the throat.  Famotidine (Pepcid) for reflux-related sore throat.  Warm salt water gargle, tea with honey and lemon, and soothing throat lozenges.  Avoid aspirin and ibuprofen.

## 2025-02-19 NOTE — FSED PROVIDER NOTE
Subjective   History of Present Illness    Patient is complaining of a fever, generalized headache, nasal congestion, sore throat, generalized bodyaches which started 2 days ago.  Highest temperature at home was 100 and was resolved with over-the-counter Tylenol.    Review of Systems   Constitutional:  Positive for fever. Negative for activity change and appetite change.   HENT:  Positive for congestion and sore throat.    Eyes:  Negative for pain.   Respiratory:  Positive for cough. Negative for shortness of breath.    Gastrointestinal:  Negative for nausea and vomiting.   Musculoskeletal:  Positive for myalgias. Negative for arthralgias.   Skin:  Negative for color change.   Neurological:  Negative for dizziness.   All other systems reviewed and are negative.      Past Medical History:   Diagnosis Date    Trauma     10 years old    Vascular abnormality     Right Arm        Allergies   Allergen Reactions    Iodine Anaphylaxis    Shellfish-Derived Products Anaphylaxis       Past Surgical History:   Procedure Laterality Date    OTHER SURGICAL HISTORY Right     Arm sclerotherapy x5       Family History   Adopted: Yes   Problem Relation Age of Onset    Ovarian cancer Mother     Breast cancer Paternal Grandmother        Social History     Socioeconomic History    Marital status: Single    Number of children: 0    Highest education level: Some college, no degree   Tobacco Use    Smoking status: Never     Passive exposure: Never    Smokeless tobacco: Never   Vaping Use    Vaping status: Never Used   Substance and Sexual Activity    Alcohol use: Not Currently    Drug use: Never    Sexual activity: Yes     Partners: Male     Birth control/protection: None, Pill           Objective   Physical Exam  Vitals and nursing note reviewed.   Constitutional:       Appearance: Normal appearance. She is normal weight.   HENT:      Head: Normocephalic and atraumatic.      Nose: Nose normal. Congestion present.      Mouth/Throat:       Mouth: Mucous membranes are moist.      Pharynx: Posterior oropharyngeal erythema present.   Eyes:      Pupils: Pupils are equal, round, and reactive to light.   Cardiovascular:      Rate and Rhythm: Normal rate and regular rhythm.      Pulses: Normal pulses.      Heart sounds: Normal heart sounds.   Pulmonary:      Effort: Pulmonary effort is normal.      Breath sounds: Normal breath sounds.   Musculoskeletal:         General: Normal range of motion.      Cervical back: Normal range of motion.      Right lower leg: No edema.      Left lower leg: No edema.   Skin:     General: Skin is warm.   Neurological:      General: No focal deficit present.      Mental Status: She is alert.   Psychiatric:         Behavior: Behavior is cooperative.         Procedures           ED Course                                           Medical Decision Making  Problems Addressed:  Nasal congestion: acute illness or injury  Sore throat: acute illness or injury  Upper respiratory tract infection, unspecified type: acute illness or injury        Final diagnoses:   Upper respiratory tract infection, unspecified type   Nasal congestion   Sore throat       ED Disposition  ED Disposition       ED Disposition   Discharge    Condition   Stable    Comment   --               Neva Ortiz, APRN  [address] [id number] [phone]    Call            Medication List      No changes were made to your prescriptions during this visit.

## 2025-02-27 ENCOUNTER — ROUTINE PRENATAL (OUTPATIENT)
Dept: OBSTETRICS AND GYNECOLOGY | Age: 25
End: 2025-02-27
Payer: COMMERCIAL

## 2025-02-27 VITALS — WEIGHT: 162.4 LBS | DIASTOLIC BLOOD PRESSURE: 64 MMHG | SYSTOLIC BLOOD PRESSURE: 122 MMHG | BODY MASS INDEX: 26.21 KG/M2

## 2025-02-27 DIAGNOSIS — O44.40 LOW-LYING PLACENTA: ICD-10-CM

## 2025-02-27 DIAGNOSIS — O09.899 MATERNAL VARICELLA, NON-IMMUNE: ICD-10-CM

## 2025-02-27 DIAGNOSIS — Z34.01 ENCOUNTER FOR SUPERVISION OF LOW-RISK FIRST PREGNANCY IN FIRST TRIMESTER: Primary | ICD-10-CM

## 2025-02-27 DIAGNOSIS — Z13.89 SCREENING FOR BLOOD OR PROTEIN IN URINE: ICD-10-CM

## 2025-02-27 DIAGNOSIS — Z28.39 MATERNAL VARICELLA, NON-IMMUNE: ICD-10-CM

## 2025-02-27 DIAGNOSIS — Z3A.19 19 WEEKS GESTATION OF PREGNANCY: ICD-10-CM

## 2025-02-27 DIAGNOSIS — Q27.9 CONGENITAL MALFORMATION OF PERIPHERAL VASCULAR SYSTEM, UNSPECIFIED: ICD-10-CM

## 2025-02-27 PROBLEM — Z34.00 ENCOUNTER FOR SUPERVISION OF LOW-RISK FIRST PREGNANCY, ANTEPARTUM: Status: ACTIVE | Noted: 2024-12-05

## 2025-02-27 NOTE — PROGRESS NOTES
Chief Complaint   Patient presents with    Routine Prenatal Visit     19w0d, Anatomy scan today, No problems today       Mayra Garner, a 24 y.o.  at 19w0d, presents for OB follow-up.  She is doing well today.  Denies loss of fluid and contractions.  Reports rare bloody discharge.  Endorses fetal movements.    Objective  BP Readings from Last 3 Encounters:   25 122/64   25 113/80   02/15/25 129/95      Wt Readings from Last 3 Encounters:   25 73.7 kg (162 lb 6.4 oz)   25 71.7 kg (158 lb)   02/15/25 70.8 kg (156 lb)      Total weight gain this pregnancy: 3.81 kg (8 lb 6.4 oz)    General: Awake, alert, no apparent distress  Respiratory: No increased work of breathing  Abdomen: Fundus soft and nontender  Extremity: Nontender, no edema    A/P  Diagnoses and all orders for this visit:    1. Encounter for supervision of low-risk first pregnancy in first trimester (Primary)  Overview:  -PNL: plan for 1hr GTT/CBC/RPR at 24-28wga  -Genetics: carrier screen/cfDNA neg, msAFP today, anatomy US normal but incomplete - repeat next visit  -Pap: NIL 3/2/23  -Imm: RI, VNI, tdap > 27wga  -Contraception: discuss at future visit  -Birthplan: discuss at future visit  -Care coordination: accepts blood transfusions, no latex allergy, call schedule reviewed      Orders:  -     Alpha Fetoprotein, Maternal    2. 19 weeks gestation of pregnancy  -     POC Urinalysis Dipstick    3. Screening for blood or protein in urine  -     POC Urinalysis Dipstick    4. Congenital malformation of peripheral vascular system, unspecified  Overview:  -Patient reports vascular malformation in upper right arm.  Present since birth but not genetic.  S/p 5 repairs.  Told it may change with grow with pregnancy.  Unsure of any other details.  -S/p MFM consult, consider lovenox ppx if pain develops but otherwise no intervention  -Plan for serial growth Us, normal at 36 wga      5. Lactating mother    6. Maternal varicella,  non-immune  Overview:  H/o varivax, may update postpartum at patient request      7. Low-lying placenta  Overview:  Reevaluate at 28 WGA          SAB precautions reviewed  Return in about 4 weeks (around 3/27/2025) for Next f/u with anatomy Us.    Yolanda Sanz MD

## 2025-03-01 LAB
AFP INTERP SERPL-IMP: NORMAL
AFP INTERP SERPL-IMP: NORMAL
AFP MOM SERPL: 1
AFP SERPL-MCNC: 46.7 NG/ML
AGE AT DELIVERY: 24 YR
GA METHOD: NORMAL
GA: 19 WEEKS
IDDM PATIENT QL: NO
LABORATORY COMMENT REPORT: NORMAL
MULTIPLE PREGNANCY: NO
NEURAL TUBE DEFECT RISK FETUS: NORMAL %
RESULT: NORMAL

## 2025-03-01 NOTE — PROGRESS NOTES
Please call patient to let her know that her screen for neural tube defects was negative.Thank you,  Yolanda Sanz MD  03/01/25  12:25 EST

## 2025-03-03 ENCOUNTER — TELEPHONE (OUTPATIENT)
Dept: OBSTETRICS AND GYNECOLOGY | Age: 25
End: 2025-03-03
Payer: COMMERCIAL

## 2025-03-04 ENCOUNTER — HOSPITAL ENCOUNTER (OUTPATIENT)
Facility: HOSPITAL | Age: 25
Discharge: HOME | End: 2025-03-04
Attending: EMERGENCY MEDICINE | Admitting: EMERGENCY MEDICINE
Payer: COMMERCIAL

## 2025-03-04 VITALS
HEIGHT: 66 IN | HEART RATE: 112 BPM | OXYGEN SATURATION: 98 % | WEIGHT: 160 LBS | BODY MASS INDEX: 25.71 KG/M2 | RESPIRATION RATE: 16 BRPM | DIASTOLIC BLOOD PRESSURE: 82 MMHG | TEMPERATURE: 97.9 F | SYSTOLIC BLOOD PRESSURE: 126 MMHG

## 2025-03-04 DIAGNOSIS — J03.90 TONSILLITIS: Primary | ICD-10-CM

## 2025-03-04 LAB — STREP A PCR: NOT DETECTED

## 2025-03-04 PROCEDURE — G0463 HOSPITAL OUTPT CLINIC VISIT: HCPCS

## 2025-03-04 PROCEDURE — 87651 STREP A DNA AMP PROBE: CPT | Performed by: EMERGENCY MEDICINE

## 2025-03-04 RX ORDER — AMOXICILLIN 500 MG/1
500 CAPSULE ORAL 2 TIMES DAILY
Qty: 20 CAPSULE | Refills: 0 | Status: SHIPPED | OUTPATIENT
Start: 2025-03-04 | End: 2025-03-14

## 2025-03-04 RX ORDER — AMOXICILLIN 250 MG/1
500 CAPSULE ORAL ONCE
Status: COMPLETED | OUTPATIENT
Start: 2025-03-04 | End: 2025-03-04

## 2025-03-04 RX ADMIN — AMOXICILLIN 500 MG: 250 CAPSULE ORAL at 12:13

## 2025-03-04 NOTE — FSED PROVIDER NOTE
Subjective   History of Present Illness  Patient is a 24-year-old female approximately 20 weeks pregnant who presents to the emergency department for sore throat and fever x 2 days.  He has noticed a small white spot on her left tonsil.  Had a headache last night.  Mild cough.  Denies runny nose, congestion, ear pain.  Patient's son tested positive for strep.        Review of Systems   Constitutional:  Positive for fatigue and fever. Negative for appetite change, chills and diaphoresis.   HENT:  Positive for sore throat. Negative for congestion, ear discharge, ear pain, postnasal drip, rhinorrhea, trouble swallowing and voice change.    Eyes:  Negative for visual disturbance.   Respiratory:  Positive for cough. Negative for shortness of breath.    Cardiovascular:  Negative for chest pain and leg swelling.   Gastrointestinal:  Negative for abdominal pain, constipation, diarrhea, nausea and vomiting.   Genitourinary:  Negative for dysuria and flank pain.   Musculoskeletal:  Negative for arthralgias, gait problem and myalgias.   Skin:  Negative for rash and wound.   Neurological:  Positive for headaches. Negative for dizziness and syncope.   Psychiatric/Behavioral:  Negative for confusion. The patient is not nervous/anxious.        Past Medical History:   Diagnosis Date    Trauma     10 years old    Vascular abnormality     Right Arm        Allergies   Allergen Reactions    Iodine Anaphylaxis    Shellfish-Derived Products Anaphylaxis       Past Surgical History:   Procedure Laterality Date    OTHER SURGICAL HISTORY Right     Arm sclerotherapy x5       Family History   Adopted: Yes   Problem Relation Age of Onset    Ovarian cancer Mother     Breast cancer Paternal Grandmother        Social History     Socioeconomic History    Marital status: Single    Number of children: 0    Highest education level: Some college, no degree   Tobacco Use    Smoking status: Never     Passive exposure: Never    Smokeless tobacco: Never    Vaping Use    Vaping status: Never Used   Substance and Sexual Activity    Alcohol use: Not Currently    Drug use: Never    Sexual activity: Yes     Partners: Male     Birth control/protection: None, Pill           Objective   Physical Exam  Constitutional:       General: She is not in acute distress.     Appearance: She is normal weight. She is not ill-appearing or toxic-appearing.   HENT:      Right Ear: Tympanic membrane, ear canal and external ear normal.      Left Ear: Tympanic membrane, ear canal and external ear normal.      Mouth/Throat:      Mouth: Mucous membranes are moist.      Pharynx: Oropharynx is clear. Oropharyngeal exudate and posterior oropharyngeal erythema present.      Comments: 1+ tonsillitis bilaterally.  Exudate present on left tonsils.  No other lesions.  Midline uvula.  No drooling, stridor, wheezing.  No significant voice change.  Eyes:      Extraocular Movements: Extraocular movements intact.      Conjunctiva/sclera: Conjunctivae normal.      Pupils: Pupils are equal, round, and reactive to light.   Pulmonary:      Effort: Pulmonary effort is normal. No respiratory distress.   Musculoskeletal:         General: Normal range of motion.      Cervical back: Normal range of motion.   Skin:     General: Skin is warm and dry.   Neurological:      General: No focal deficit present.      Mental Status: She is alert.   Psychiatric:         Mood and Affect: Mood normal.         Behavior: Behavior normal.         Procedures           ED Course                                           Medical Decision Making  Patient is pregnant 24-year-old who presents for sore throat and known strep exposure.  She overall appears well, no acute stress, nontoxic.  Exam is consistent with tonsillitis.  She is negative for strep, but her son did test positive today.  I consider this false negative.  Will cover with antibiotics.  Patient to follow-up with OB/GYN.  Discussed when to return to the emergency  department.  Answered all questions.  Patient verbalized understanding and was agreeable to plan and discharge.    My differential diagnosis for sore throat includes but is not limited to viral pharyngitis, strep pharyngitis, mononucleosis, epiglottitis, esophageal abrasion, peritonsillar abscess, retropharyngeal abscess, tonsillitis, scarlet fever, viral syndrome, COVID-19 and herpetic gingival stomatitis       Problems Addressed:  Tonsillitis: complicated acute illness or injury    Risk  Prescription drug management.        Final diagnoses:   Tonsillitis       ED Disposition  ED Disposition       ED Disposition   Discharge    Condition   Stable    Comment   --               Neva Ortiz, APRN  [address] [id number] [phone]               Medication List        New Prescriptions      amoxicillin 500 MG capsule  Commonly known as: AMOXIL  Take 1 capsule by mouth 2 (Two) Times a Day for 10 days.               Where to Get Your Medications        These medications were sent to [facility] [id number] - [facility] [address] [facility] AT [facility] - [phone] [facility] [phone] [address], [facility] [address]      Phone: [phone]   amoxicillin 500 MG capsule

## 2025-03-04 NOTE — DISCHARGE INSTRUCTIONS
Take the prescribed antibiotic medicine you are given as directed until it is gone. Take it even if you feel better. It treats the infection and stops it from returning. Not taking all the medicine can make future infections hard to treat.  Return to emergency department for worsening symptoms or other medical emergencies.  Recommended follow-up with OB/GYN.  Refer to the attached instructions for further information.

## 2025-03-27 ENCOUNTER — ROUTINE PRENATAL (OUTPATIENT)
Dept: OBSTETRICS AND GYNECOLOGY | Age: 25
End: 2025-03-27
Payer: COMMERCIAL

## 2025-03-27 VITALS — BODY MASS INDEX: 27.34 KG/M2 | WEIGHT: 169.4 LBS | SYSTOLIC BLOOD PRESSURE: 108 MMHG | DIASTOLIC BLOOD PRESSURE: 62 MMHG

## 2025-03-27 DIAGNOSIS — Z3A.23 23 WEEKS GESTATION OF PREGNANCY: Primary | ICD-10-CM

## 2025-03-27 DIAGNOSIS — Z34.00 ENCOUNTER FOR SUPERVISION OF LOW-RISK FIRST PREGNANCY, ANTEPARTUM: ICD-10-CM

## 2025-03-27 DIAGNOSIS — Z13.89 SCREENING FOR BLOOD OR PROTEIN IN URINE: ICD-10-CM

## 2025-03-27 DIAGNOSIS — Z28.39 MATERNAL VARICELLA, NON-IMMUNE: ICD-10-CM

## 2025-03-27 DIAGNOSIS — O09.899 MATERNAL VARICELLA, NON-IMMUNE: ICD-10-CM

## 2025-03-27 DIAGNOSIS — O44.40 LOW-LYING PLACENTA: ICD-10-CM

## 2025-03-27 DIAGNOSIS — Q27.9 CONGENITAL MALFORMATION OF PERIPHERAL VASCULAR SYSTEM, UNSPECIFIED: ICD-10-CM

## 2025-03-27 NOTE — PROGRESS NOTES
Chief Complaint   Patient presents with    Routine Prenatal Visit     23w0d, Repeat anatomy scan today, No problems today       Mayra Garner, a 24 y.o.  at 23w0d, presents for OB follow-up.  She is doing well today.  Denies loss of fluid, vaginal bleeding, and contractions.  Endorses fetal movements.    Objective  BP Readings from Last 3 Encounters:   25 108/62   25 126/82   25 122/64      Wt Readings from Last 3 Encounters:   25 76.8 kg (169 lb 6.4 oz)   25 72.6 kg (160 lb)   25 73.7 kg (162 lb 6.4 oz)      Total weight gain this pregnancy: 6.985 kg (15 lb 6.4 oz)    General: Awake, alert, no apparent distress  Respiratory: No increased work of breathing  Abdomen: Fundus soft and nontender  Extremity: Nontender, no edema    A/P  Diagnoses and all orders for this visit:    1. 23 weeks gestation of pregnancy (Primary)  -     POC Urinalysis Dipstick    2. Screening for blood or protein in urine  -     POC Urinalysis Dipstick    3. Congenital malformation of peripheral vascular system, unspecified  Overview:  -Patient reports vascular malformation in upper right arm.  Present since birth but not genetic.  S/p 5 repairs.  Told it may change with grow with pregnancy.  Unsure of any other details.  -S/p MFM consult, consider lovenox ppx if pain develops but otherwise no intervention  -Plan for serial growth Us, normal at 36 wga      4. Lactating mother    5. Maternal varicella, non-immune  Overview:  H/o varivax, may update postpartum at patient request      6. Encounter for supervision of low-risk first pregnancy, antepartum  Overview:  -PNL: plan for 1hr GTT/CBC/RPR at next visit  -Genetics: carrier screen/cfDNA/msAFP neg, anatomy US normal  -Pap: NIL 3/2/23  -Imm: RI, VNI, tdap > 27wga  -Contraception: discuss at future visit  -Birthplan: discuss at future visit  -Care coordination: accepts blood transfusions, no latex allergy, call schedule reviewed        7. Low-lying  placenta  Overview:  -Posterior placental edge 1.9 cm from os  -Reevaluate at 28 WGA          Labor precautions reviewed  Return in about 4 weeks (around 4/24/2025) for F/u with 1hr GTT/CBC/RPR.    Yolanda Sanz MD

## 2025-04-25 ENCOUNTER — ROUTINE PRENATAL (OUTPATIENT)
Dept: OBSTETRICS AND GYNECOLOGY | Age: 25
End: 2025-04-25
Payer: COMMERCIAL

## 2025-04-25 VITALS — DIASTOLIC BLOOD PRESSURE: 72 MMHG | WEIGHT: 177 LBS | SYSTOLIC BLOOD PRESSURE: 116 MMHG | BODY MASS INDEX: 28.57 KG/M2

## 2025-04-25 DIAGNOSIS — O44.40 LOW-LYING PLACENTA: ICD-10-CM

## 2025-04-25 DIAGNOSIS — Z28.39 MATERNAL VARICELLA, NON-IMMUNE: ICD-10-CM

## 2025-04-25 DIAGNOSIS — Q27.9 CONGENITAL MALFORMATION OF PERIPHERAL VASCULAR SYSTEM, UNSPECIFIED: ICD-10-CM

## 2025-04-25 DIAGNOSIS — R60.0 EDEMA OF BOTH LOWER LEGS: ICD-10-CM

## 2025-04-25 DIAGNOSIS — Z34.00 ENCOUNTER FOR SUPERVISION OF LOW-RISK FIRST PREGNANCY, ANTEPARTUM: Primary | ICD-10-CM

## 2025-04-25 DIAGNOSIS — Z3A.27 27 WEEKS GESTATION OF PREGNANCY: ICD-10-CM

## 2025-04-25 DIAGNOSIS — Z13.89 SCREENING FOR BLOOD OR PROTEIN IN URINE: ICD-10-CM

## 2025-04-25 DIAGNOSIS — Z13.1 SCREENING FOR DIABETES MELLITUS: ICD-10-CM

## 2025-04-25 DIAGNOSIS — Z13.0 SCREENING FOR IRON DEFICIENCY ANEMIA: ICD-10-CM

## 2025-04-25 DIAGNOSIS — O09.899 MATERNAL VARICELLA, NON-IMMUNE: ICD-10-CM

## 2025-04-25 LAB
BILIRUB BLD-MCNC: NEGATIVE MG/DL
GLUCOSE UR STRIP-MCNC: NEGATIVE MG/DL
KETONES UR QL: NEGATIVE
LEUKOCYTE EST, POC: NEGATIVE
NITRITE UR-MCNC: NEGATIVE MG/ML
PH UR: 6 [PH] (ref 5–8)
PROT UR STRIP-MCNC: NEGATIVE MG/DL
RBC # UR STRIP: NEGATIVE /UL
SP GR UR: 1.02 (ref 1–1.03)
UROBILINOGEN UR QL: NORMAL

## 2025-04-25 NOTE — PROGRESS NOTES
Chief Complaint   Patient presents with    Routine Prenatal Visit     Ob check & 1hrgtt- 27w1d, reports good fm, c/o swollen feet & ankles, tdap today     Mayra Garner, a 24 y.o.  at 27w1d, presents for OB follow-up.  Patient of Dr. Sanz. She is doing well today.  Denies loss of fluid, vaginal bleeding, and contractions.  Endorses fetal movements.  No spotting for the last week.   C/o swelling in both her ankles that started this week. No headaches or visual changes.   Patient agreeable to Tdap vaccine today.  Completing 1 hr GTT, CBC, and syphilis screening today.  Patient states that she needs a written prescription for breast pump with [employer].    Objective  BP Readings from Last 3 Encounters:   25 116/72   25 108/62   25 126/82      Wt Readings from Last 3 Encounters:   25 80.3 kg (177 lb)   25 76.8 kg (169 lb 6.4 oz)   25 72.6 kg (160 lb)      Total weight gain this pregnancy: 10.4 kg (23 lb)    Review of systems:   Gen: negative  CV:     lower extremity edema  GI: negative  :   negative and good fetal movement noted   MS:    negative  Neuro: negative  Pul: negative    Physical Exam  Constitutional:       General: She is not in acute distress.  Cardiovascular:      Rate and Rhythm: Normal rate and regular rhythm.   Pulmonary:      Effort: Pulmonary effort is normal.      Breath sounds: Normal breath sounds.   Abdominal:      Palpations: Abdomen is soft.      Tenderness: There is no abdominal tenderness.   Musculoskeletal:      Right lower le+ Edema present.      Left lower le+ Edema present.   Skin:     General: Skin is warm.   Neurological:      Mental Status: She is alert and oriented to person, place, and time.   Psychiatric:         Mood and Affect: Mood normal.         Behavior: Behavior normal.         Thought Content: Thought content normal.         Judgment: Judgment normal.         A/P  Diagnoses and all orders for this visit:    1.  Encounter for supervision of low-risk first pregnancy, antepartum (Primary)  Overview:  -PNL: 1hr GTT/CBC/RPR today  -Genetics: carrier screen/cfDNA/msAFP neg, anatomy US normal  -Pap: NIL 3/2/23  -Imm: RI, VNI, tdap today  -Contraception: discuss at future visit  -Birthplan: discuss at future visit  -Care coordination: accepts blood transfusions, no latex allergy, call schedule reviewed        2. 27 weeks gestation of pregnancy  -     Treponema pallidum AB w/Reflex RPR    3. Screening for diabetes mellitus  -     Gestational Screen 1 Hr ([facility])    4. Screening for iron deficiency anemia  -     CBC & Differential    5. Screening for blood or protein in urine  -     POC Urinalysis Dipstick    6. Congenital malformation of peripheral vascular system, unspecified  Overview:  -Patient reports vascular malformation in upper right arm.  Present since birth but not genetic.  S/p 5 repairs.  Told it may change with grow with pregnancy.  Unsure of any other details.  -S/p MFM consult, consider lovenox ppx if pain develops but otherwise no intervention  -Plan for serial growth Us, normal at 36 wga      7. Low-lying placenta  Overview:  -Posterior placental edge 1.9 cm from os  -Reevaluate at 28 WGA      8. Maternal varicella, non-immune  Overview:  H/o varivax, may update postpartum at patient request      9. Edema of both lower legs  -     CBC & Differential  -     Comprehensive Metabolic Panel  -     Protein / Creatinine Ratio, Urine - Urine, Clean Catch    Other orders  -     Tdap Vaccine => 8yo IM (BOOSTRIX/ADACEL)      --Edema of both lower legs: 1+ pitting edema present on both lower legs.  Blood pressure normal 116/72 and no symptoms of headaches or visual changes.  Will check baseline preeclampsia labs today.  CBC, CMP, protein creatinine ratio completed. Will call patient with results and treat accordingly.  Preeclampsia signs/symptoms reviewed.  --Tdap vaccine given today.  --1 hr GTT, CBC, syphilis screening labs  completed today. Will call patient with results and treat accordingly.  --[employer] breast pump form faxed.   --Pre-term aabor precautions reviewed  Return for Next scheduled follow up. - in 2 weeks on 5/7/2025 for US and OB f/u with BABAR RodríguezC

## 2025-04-26 LAB
ALBUMIN SERPL-MCNC: 3.6 G/DL (ref 3.5–5.2)
ALBUMIN/GLOB SERPL: 1.4 G/DL
ALP SERPL-CCNC: 64 U/L (ref 39–117)
ALT SERPL-CCNC: 11 U/L (ref 1–33)
AST SERPL-CCNC: 13 U/L (ref 1–32)
BILIRUB SERPL-MCNC: 0.2 MG/DL (ref 0–1.2)
BUN SERPL-MCNC: 6 MG/DL (ref 6–20)
BUN/CREAT SERPL: 12.2 (ref 7–25)
CALCIUM SERPL-MCNC: 8.7 MG/DL (ref 8.6–10.5)
CHLORIDE SERPL-SCNC: 104 MMOL/L (ref 98–107)
CO2 SERPL-SCNC: 24.1 MMOL/L (ref 22–29)
CREAT SERPL-MCNC: 0.49 MG/DL (ref 0.57–1)
CREAT UR-MCNC: 49.8 MG/DL
EGFRCR SERPLBLD CKD-EPI 2021: 136 ML/MIN/1.73
GLOBULIN SER CALC-MCNC: 2.5 GM/DL
GLUCOSE SERPL-MCNC: 104 MG/DL (ref 65–99)
POTASSIUM SERPL-SCNC: 3.9 MMOL/L (ref 3.5–5.2)
PROT SERPL-MCNC: 6.1 G/DL (ref 6–8.5)
PROT UR-MCNC: 4.7 MG/DL
PROT/CREAT UR: 94.4 MG/G CREA (ref 0–200)
SODIUM SERPL-SCNC: 137 MMOL/L (ref 136–145)

## 2025-04-28 ENCOUNTER — DOCUMENTATION (OUTPATIENT)
Dept: OBSTETRICS AND GYNECOLOGY | Age: 25
End: 2025-04-28
Payer: COMMERCIAL

## 2025-04-28 DIAGNOSIS — Z3A.27 27 WEEKS GESTATION OF PREGNANCY: ICD-10-CM

## 2025-04-28 DIAGNOSIS — G43.109 OCULAR MIGRAINE: Primary | ICD-10-CM

## 2025-04-28 LAB
BASOPHILS # BLD AUTO: 0.02 10*3/MM3 (ref 0–0.2)
BASOPHILS NFR BLD AUTO: 0.3 % (ref 0–1.5)
EOSINOPHIL # BLD AUTO: 0.03 10*3/MM3 (ref 0–0.4)
EOSINOPHIL NFR BLD AUTO: 0.4 % (ref 0.3–6.2)
ERYTHROCYTE [DISTWIDTH] IN BLOOD BY AUTOMATED COUNT: 12.4 % (ref 12.3–15.4)
GLUCOSE 1H P 50 G GLC PO SERPL-MCNC: 101 MG/DL (ref 65–139)
HCT VFR BLD AUTO: 34.3 % (ref 34–46.6)
HGB BLD-MCNC: 10.9 G/DL (ref 12–15.9)
IMM GRANULOCYTES # BLD AUTO: 0.02 10*3/MM3 (ref 0–0.05)
IMM GRANULOCYTES NFR BLD AUTO: 0.3 % (ref 0–0.5)
LYMPHOCYTES # BLD AUTO: 1.77 10*3/MM3 (ref 0.7–3.1)
LYMPHOCYTES NFR BLD AUTO: 24.8 % (ref 19.6–45.3)
MCH RBC QN AUTO: 28.9 PG (ref 26.6–33)
MCHC RBC AUTO-ENTMCNC: 31.8 G/DL (ref 31.5–35.7)
MCV RBC AUTO: 91 FL (ref 79–97)
MONOCYTES # BLD AUTO: 0.37 10*3/MM3 (ref 0.1–0.9)
MONOCYTES NFR BLD AUTO: 5.2 % (ref 5–12)
NEUTROPHILS # BLD AUTO: 4.94 10*3/MM3 (ref 1.7–7)
NEUTROPHILS NFR BLD AUTO: 69 % (ref 42.7–76)
NRBC BLD AUTO-RTO: 0 /100 WBC (ref 0–0.2)
PLATELET # BLD AUTO: 248 10*3/MM3 (ref 140–450)
RBC # BLD AUTO: 3.77 10*6/MM3 (ref 3.77–5.28)
TREPONEMA PALLIDUM IGG+IGM AB [PRESENCE] IN SERUM OR PLASMA BY IMMUNOASSAY: NON REACTIVE
WBC # BLD AUTO: 7.15 10*3/MM3 (ref 3.4–10.8)

## 2025-04-28 NOTE — PROGRESS NOTES
Patient called to report ocular migraines.  She reports 45-minute episodes of peripheral vision loss with bright lights followed by severe headache.  She recently consulted with the optometrist who diagnosed her with ocular migraines.  She has never seen a neurologist for this issue.  2 of them have occurred today which is uncommon for her.  No motor deficits noted.  Endorses regular fetal movement.  Denies contractions, loss of fluid, vaginal bleeding.  Discussed neurology referral for further evaluation and treatment.  Ordered,    Yolanda Sanz MD  04/28/25  19:07 EDT.

## 2025-04-29 ENCOUNTER — TELEPHONE (OUTPATIENT)
Dept: OBSTETRICS AND GYNECOLOGY | Age: 25
End: 2025-04-29
Payer: COMMERCIAL

## 2025-04-29 RX ORDER — FERROUS SULFATE 325(65) MG
325 TABLET ORAL
Qty: 30 TABLET | Refills: 3 | Status: SHIPPED | OUTPATIENT
Start: 2025-04-29

## 2025-04-29 NOTE — TELEPHONE ENCOUNTER
Medication    Patient called to clarify medications. Patient states her prenatal vitamin has 18mg of iron and as she went to  iron supplement pharmacist question if she should be combining iron dosage as well as iron in prenatal vitamin. Please advise.

## 2025-05-04 ENCOUNTER — HOSPITAL ENCOUNTER (EMERGENCY)
Facility: HOSPITAL | Age: 25
Discharge: HOME | End: 2025-05-04
Attending: STUDENT IN AN ORGANIZED HEALTH CARE EDUCATION/TRAINING PROGRAM | Admitting: OBSTETRICS & GYNECOLOGY
Payer: COMMERCIAL

## 2025-05-04 VITALS
RESPIRATION RATE: 18 BRPM | HEART RATE: 93 BPM | HEIGHT: 66 IN | WEIGHT: 180 LBS | TEMPERATURE: 98.4 F | SYSTOLIC BLOOD PRESSURE: 127 MMHG | DIASTOLIC BLOOD PRESSURE: 75 MMHG | BODY MASS INDEX: 28.93 KG/M2

## 2025-05-04 LAB
ALBUMIN SERPL-MCNC: 3.3 G/DL (ref 3.5–5.2)
ALBUMIN/GLOB SERPL: 1.2 G/DL
ALP SERPL-CCNC: 65 U/L (ref 39–117)
ALT SERPL W P-5'-P-CCNC: 12 U/L (ref 1–33)
ANION GAP SERPL CALCULATED.3IONS-SCNC: 10 MMOL/L (ref 5–15)
AST SERPL-CCNC: 18 U/L (ref 1–32)
BASOPHILS # BLD AUTO: 0.02 10*3/MM3 (ref 0–0.2)
BASOPHILS NFR BLD AUTO: 0.2 % (ref 0–1.5)
BILIRUB SERPL-MCNC: 0.2 MG/DL (ref 0–1.2)
BILIRUB UR QL STRIP: NEGATIVE
BUN SERPL-MCNC: 6 MG/DL (ref 6–20)
BUN/CREAT SERPL: 8.7 (ref 7–25)
CALCIUM SPEC-SCNC: 8.3 MG/DL (ref 8.6–10.5)
CHLORIDE SERPL-SCNC: 105 MMOL/L (ref 98–107)
CLARITY UR: CLEAR
CO2 SERPL-SCNC: 24 MMOL/L (ref 22–29)
COLOR UR: YELLOW
CREAT SERPL-MCNC: 0.69 MG/DL (ref 0.57–1)
DEPRECATED RDW RBC AUTO: 41.3 FL (ref 37–54)
EGFRCR SERPLBLD CKD-EPI 2021: 125.2 ML/MIN/1.73
EOSINOPHIL # BLD AUTO: 0.03 10*3/MM3 (ref 0–0.4)
EOSINOPHIL NFR BLD AUTO: 0.4 % (ref 0.3–6.2)
ERYTHROCYTE [DISTWIDTH] IN BLOOD BY AUTOMATED COUNT: 12.5 % (ref 12.3–15.4)
GLOBULIN UR ELPH-MCNC: 2.7 GM/DL
GLUCOSE SERPL-MCNC: 95 MG/DL (ref 65–99)
GLUCOSE UR STRIP-MCNC: NEGATIVE MG/DL
HCT VFR BLD AUTO: 34.5 % (ref 34–46.6)
HGB BLD-MCNC: 11.1 G/DL (ref 12–15.9)
HGB UR QL STRIP.AUTO: NEGATIVE
IMM GRANULOCYTES # BLD AUTO: 0.04 10*3/MM3 (ref 0–0.05)
IMM GRANULOCYTES NFR BLD AUTO: 0.5 % (ref 0–0.5)
KETONES UR QL STRIP: NEGATIVE
LEUKOCYTE ESTERASE UR QL STRIP.AUTO: NEGATIVE
LYMPHOCYTES # BLD AUTO: 2.15 10*3/MM3 (ref 0.7–3.1)
LYMPHOCYTES NFR BLD AUTO: 25.1 % (ref 19.6–45.3)
MCH RBC QN AUTO: 29.4 PG (ref 26.6–33)
MCHC RBC AUTO-ENTMCNC: 32.2 G/DL (ref 31.5–35.7)
MCV RBC AUTO: 91.3 FL (ref 79–97)
MONOCYTES # BLD AUTO: 0.45 10*3/MM3 (ref 0.1–0.9)
MONOCYTES NFR BLD AUTO: 5.3 % (ref 5–12)
NEUTROPHILS NFR BLD AUTO: 5.87 10*3/MM3 (ref 1.7–7)
NEUTROPHILS NFR BLD AUTO: 68.5 % (ref 42.7–76)
NITRITE UR QL STRIP: NEGATIVE
NRBC BLD AUTO-RTO: 0 /100 WBC (ref 0–0.2)
PH UR STRIP.AUTO: 7.5 [PH] (ref 5–8)
PLATELET # BLD AUTO: 245 10*3/MM3 (ref 140–450)
PMV BLD AUTO: 9.3 FL (ref 6–12)
POTASSIUM SERPL-SCNC: 3.9 MMOL/L (ref 3.5–5.2)
PROT SERPL-MCNC: 6 G/DL (ref 6–8.5)
PROT UR QL STRIP: NEGATIVE
RBC # BLD AUTO: 3.78 10*6/MM3 (ref 3.77–5.28)
SODIUM SERPL-SCNC: 139 MMOL/L (ref 136–145)
SP GR UR STRIP: <1.005 (ref 1–1.03)
UROBILINOGEN UR QL STRIP: ABNORMAL
WBC NRBC COR # BLD AUTO: 8.56 10*3/MM3 (ref 3.4–10.8)

## 2025-05-04 PROCEDURE — 85025 COMPLETE CBC W/AUTO DIFF WBC: CPT | Performed by: OBSTETRICS & GYNECOLOGY

## 2025-05-04 PROCEDURE — 80053 COMPREHEN METABOLIC PANEL: CPT | Performed by: OBSTETRICS & GYNECOLOGY

## 2025-05-04 PROCEDURE — 99284 EMERGENCY DEPT VISIT MOD MDM: CPT | Performed by: OBSTETRICS & GYNECOLOGY

## 2025-05-04 PROCEDURE — 59025 FETAL NON-STRESS TEST: CPT

## 2025-05-04 PROCEDURE — 81003 URINALYSIS AUTO W/O SCOPE: CPT | Performed by: OBSTETRICS & GYNECOLOGY

## 2025-05-04 PROCEDURE — 87086 URINE CULTURE/COLONY COUNT: CPT | Performed by: OBSTETRICS & GYNECOLOGY

## 2025-05-04 RX ORDER — ACETAMINOPHEN 500 MG
1000 TABLET ORAL ONCE
Status: COMPLETED | OUTPATIENT
Start: 2025-05-04 | End: 2025-05-04

## 2025-05-04 RX ORDER — MAGNESIUM OXIDE 400 MG/1
400 TABLET ORAL DAILY PRN
Qty: 30 TABLET | Refills: 0 | Status: SHIPPED | OUTPATIENT
Start: 2025-05-04 | End: 2025-06-03

## 2025-05-04 RX ADMIN — ACETAMINOPHEN 1000 MG: 500 TABLET, FILM COATED ORAL at 17:50

## 2025-05-04 NOTE — OBED NOTES
"[place] Note OB        Patient Name: Mayra Garner  YOB: 2000  MRN: [id number]  Admission Date: 2025  4:35 PM  Date of Service: 2025    Chief Complaint: Headache (Patient has been having auras for the past week. Last one was at 3pm. She has a headache rating it a 6/10. +FM, -LF, -VB. )        Subjective     Mayra Garner is a 24 y.o. female  at 28w3d with Estimated Date of Delivery: 25 who presents with the chief complaint of visual changes.  They start with a \"black dot\" in her visual field that becomes larger.  She then sees flashing lights and her vision becomes blurry.  These episodes last about one hour and then resolve.  They have been happening more frequently recently.  Her Ob/Gyn referred her to neurology but she does not yet have an appointment.  An optometrist suggested that these are \"ocular migranes\". The patient does currently have a headache but declined treatment for it in [place].  She sees Yolanda Sanz MD for her prenatal care. Her pregnancy has been complicated by:  low lying placenta    She describes fetal movement as normal.  She denies rupture of membranes.  She denies vaginal bleeding. She is not feeling contractions.          Objective   Patient Active Problem List    Diagnosis     Ocular migraine [G43.109]     Low-lying placenta [O44.40]     Encounter for supervision of low-risk first pregnancy, antepartum [Z34.00]     Maternal varicella, non-immune [O09.899, Z28.39]     Lactating mother [Z39.1]     Congenital malformation of peripheral vascular system, unspecified [Q27.9]         OB History    Para Term  AB Living   3 1 1 0 1 1   SAB IAB Ectopic Molar Multiple Live Births   1 0 0 0 0 1      # Outcome Date GA Lbr Guido/2nd Weight Sex Type Anes PTL Lv   3 Current            2 Term 10/03/23 39w6d 03:34 / 00:18 3039 g (6 lb 11.2 oz) M Vag-Spont EPI N PARKER      Birth Comments: scale 4      Complications: History of trauma, Vascular " malformation      Name: XI PULIDO      Apgar1: 8  Apgar5: 9   1 SAB 2020 4w0d               Past Medical History:   Diagnosis Date    Trauma     10 years old    Vascular abnormality     Right Arm        Past Surgical History:   Procedure Laterality Date    OTHER SURGICAL HISTORY Right     Arm sclerotherapy x5       No current facility-administered medications on file prior to encounter.     Current Outpatient Medications on File Prior to Encounter   Medication Sig Dispense Refill    prenatal vitamin (prenatal, CLASSIC, vitamin) tablet Take  by mouth Daily.      Cholecalciferol 25 MCG (1000 UT) tablet Take 1 tablet by mouth Daily. (Patient not taking: Reported on 4/25/2025)      ferrous sulfate 325 (65 FE) MG tablet Take 1 tablet by mouth Daily With Breakfast. 30 tablet 3       Allergies   Allergen Reactions    Iodine Anaphylaxis    Shellfish-Derived Products Anaphylaxis       Family History   Adopted: Yes   Problem Relation Age of Onset    Ovarian cancer Mother     Breast cancer Paternal Grandmother        Social History     Socioeconomic History    Marital status: Single    Number of children: 0    Highest education level: Some college, no degree   Tobacco Use    Smoking status: Never     Passive exposure: Never    Smokeless tobacco: Never   Vaping Use    Vaping status: Never Used   Substance and Sexual Activity    Alcohol use: Not Currently    Drug use: Never    Sexual activity: Yes     Partners: Male     Birth control/protection: None, Pill           Review of Systems   Constitutional: Negative.    Eyes:  Positive for visual disturbance.   Cardiovascular: Negative.    Gastrointestinal: Negative.    Genitourinary: Negative.    Neurological:  Positive for headaches.        PHYSICAL EXAM:      VITAL SIGNS:  Vitals:    05/04/25 1701 05/04/25 1711   BP: 127/75    BP Location: Right arm    Patient Position: Sitting    Pulse: 93    Resp: 18    Temp: 98.4 °F (36.9 °C)    TempSrc: Oral    Weight:  81.6 kg (180  "lb)   Height:  167.6 cm (66\")        FHT:                   Baseline:  130 BPM  Variability:  Moderate = 6 - 25 BPM  Accelerations:  10 x 10 accelerations present     Decelerations:  absent  Contractions:   absent     Interpretation:    Reactive NST, CAT 1 tracing        PHYSICAL EXAM:    General: well developed; well nourished  no acute distress  mentation appropriate   Heart: Not performed.   Lungs  : breathing is unlabored     Abdomen: soft, non-tender; no masses       Cervix: was not checked.      Contractions: none        Extremities: peripheral pulses normal, no pedal edema, no clubbing or cyanosis      LABS AND TESTING ORDERED:  Uterine and fetal monitoring  Urinalysis  CBC, CMP    LAB RESULTS:    Recent Results (from the past 24 hours)   Urinalysis With Culture If Indicated - Urine, Clean Catch    Collection Time: 05/04/25  5:13 PM    Specimen: Urine, Clean Catch   Result Value Ref Range    Color, UA Yellow Yellow, Straw    Appearance, UA Clear Clear    pH, UA 7.5 5.0 - 8.0    Specific Gravity, UA <1.005 (L) 1.005 - 1.030    Glucose, UA Negative Negative    Ketones, UA Negative Negative    Bilirubin, UA Negative Negative    Blood, UA Negative Negative    Protein, UA Negative Negative    Leuk Esterase, UA Negative Negative    Nitrite, UA Negative Negative    Urobilinogen, UA 0.2 E.U./dL 0.2 - 1.0 E.U./dL   Comprehensive Metabolic Panel    Collection Time: 05/04/25  6:21 PM    Specimen: Blood   Result Value Ref Range    Glucose 95 65 - 99 mg/dL    BUN 6 6 - 20 mg/dL    Creatinine 0.69 0.57 - 1.00 mg/dL    Sodium 139 136 - 145 mmol/L    Potassium 3.9 3.5 - 5.2 mmol/L    Chloride 105 98 - 107 mmol/L    CO2 24.0 22.0 - 29.0 mmol/L    Calcium 8.3 (L) 8.6 - 10.5 mg/dL    Total Protein 6.0 6.0 - 8.5 g/dL    Albumin 3.3 (L) 3.5 - 5.2 g/dL    ALT (SGPT) 12 1 - 33 U/L    AST (SGOT) 18 1 - 32 U/L    Alkaline Phosphatase 65 39 - 117 U/L    Total Bilirubin 0.2 0.0 - 1.2 mg/dL    Globulin 2.7 gm/dL    A/G Ratio 1.2 g/dL    " BUN/Creatinine Ratio 8.7 7.0 - 25.0    Anion Gap 10.0 5.0 - 15.0 mmol/L    eGFR 125.2 >60.0 mL/min/1.73   CBC Auto Differential    Collection Time: 25  6:21 PM    Specimen: Blood   Result Value Ref Range    WBC 8.56 3.40 - 10.80 10*3/mm3    RBC 3.78 3.77 - 5.28 10*6/mm3    Hemoglobin 11.1 (L) 12.0 - 15.9 g/dL    Hematocrit 34.5 34.0 - 46.6 %    MCV 91.3 79.0 - 97.0 fL    MCH 29.4 26.6 - 33.0 pg    MCHC 32.2 31.5 - 35.7 g/dL    RDW 12.5 12.3 - 15.4 %    RDW-SD 41.3 37.0 - 54.0 fl    MPV 9.3 6.0 - 12.0 fL    Platelets 245 140 - 450 10*3/mm3    Neutrophil % 68.5 42.7 - 76.0 %    Lymphocyte % 25.1 19.6 - 45.3 %    Monocyte % 5.3 5.0 - 12.0 %    Eosinophil % 0.4 0.3 - 6.2 %    Basophil % 0.2 0.0 - 1.5 %    Immature Grans % 0.5 0.0 - 0.5 %    Neutrophils, Absolute 5.87 1.70 - 7.00 10*3/mm3    Lymphocytes, Absolute 2.15 0.70 - 3.10 10*3/mm3    Monocytes, Absolute 0.45 0.10 - 0.90 10*3/mm3    Eosinophils, Absolute 0.03 0.00 - 0.40 10*3/mm3    Basophils, Absolute 0.02 0.00 - 0.20 10*3/mm3    Immature Grans, Absolute 0.04 0.00 - 0.05 10*3/mm3    nRBC 0.0 0.0 - 0.2 /100 WBC       Lab Results   Component Value Date    ABO A 2025    RH Positive 2025       Lab Results   Component Value Date    STREPGPB Negative 2023                 Assessment & Plan     ASSESSMENT/PLAN:  Mayra Garner is a 24 y.o. female  at 28w3d who presented with:     Transient visual disturbances thought to be ocular migrane        PLAN:     Called Neurologist on-call while patient in [place].  He recommended trying magnesium oxide.  Prescription sent to pharmacy.  The Neurology office will call patient for an appointment.  Patient discharged home with labor precautions. She was advised not to drive at this time due to the frequency of her visual episodes until cleared by Neurology or her Ob/Gyn.  She will keep her scheduled prenatal appointment.    I have spent 45 minutes including face to face time with the patient, greater  than 50% in discussion of the diagnosis (counseling) and/or coordination of care.     Mary Sprague MD  5/4/2025  19:19 EDT  OB Hospitalist  Phone:  [phone]

## 2025-05-05 LAB — BACTERIA SPEC AEROBE CULT: NO GROWTH

## 2025-05-07 ENCOUNTER — ROUTINE PRENATAL (OUTPATIENT)
Dept: OBSTETRICS AND GYNECOLOGY | Age: 25
End: 2025-05-07
Payer: COMMERCIAL

## 2025-05-07 VITALS — BODY MASS INDEX: 28.92 KG/M2 | DIASTOLIC BLOOD PRESSURE: 60 MMHG | WEIGHT: 179.2 LBS | SYSTOLIC BLOOD PRESSURE: 104 MMHG

## 2025-05-07 DIAGNOSIS — G43.109 OCULAR MIGRAINE: ICD-10-CM

## 2025-05-07 DIAGNOSIS — Z28.39 MATERNAL VARICELLA, NON-IMMUNE: ICD-10-CM

## 2025-05-07 DIAGNOSIS — Z13.89 SCREENING FOR BLOOD OR PROTEIN IN URINE: ICD-10-CM

## 2025-05-07 DIAGNOSIS — O44.40 LOW-LYING PLACENTA: ICD-10-CM

## 2025-05-07 DIAGNOSIS — O09.899 MATERNAL VARICELLA, NON-IMMUNE: ICD-10-CM

## 2025-05-07 DIAGNOSIS — Z3A.28 28 WEEKS GESTATION OF PREGNANCY: Primary | ICD-10-CM

## 2025-05-07 DIAGNOSIS — Q27.9 CONGENITAL MALFORMATION OF PERIPHERAL VASCULAR SYSTEM, UNSPECIFIED: ICD-10-CM

## 2025-05-07 DIAGNOSIS — Z34.80 SUPERVISION OF OTHER NORMAL PREGNANCY, ANTEPARTUM: ICD-10-CM

## 2025-05-07 NOTE — PROGRESS NOTES
Chief Complaint   Patient presents with    Routine Prenatal Visit     28w6d, U/S to evaluate placenta, c/o ocular migraines       Mayra Garner, a 24 y.o.  at 28w6d, presents for OB follow-up.  She is doing well today.  Denies loss of fluid, vaginal bleeding, and contractions.  Endorses fetal movements.  She is having ocular migraines, often switching eyes.  She has noticed that caffeine helps.  She has not yet been contacted by neurology.    Objective  BP Readings from Last 3 Encounters:   25 104/60   25 127/75   25 116/72      Wt Readings from Last 3 Encounters:   25 81.3 kg (179 lb 3.2 oz)   25 81.6 kg (180 lb)   25 80.3 kg (177 lb)      Total weight gain this pregnancy: 11.4 kg (25 lb 3.2 oz)    General: Awake, alert, no apparent distress  Respiratory: No increased work of breathing  Abdomen: Fundus soft and nontender  Extremity: Nontender, no edema    A/P  Diagnoses and all orders for this visit:    1. 28 weeks gestation of pregnancy (Primary)  -     POC Urinalysis Dipstick    2. Screening for blood or protein in urine  -     POC Urinalysis Dipstick    3. Congenital malformation of peripheral vascular system, unspecified  Overview:  -Patient reports vascular malformation in upper right arm.  Present since birth but not genetic.  S/p 5 repairs.  Told it may change with grow with pregnancy.  Unsure of any other details.  -S/p MFM consult, consider lovenox ppx if pain develops but otherwise no intervention  -Plan for serial growth Us, normal at 28 wga, repeat at 32wga      4. Lactating mother    5. Supervision of other normal pregnancy, antepartum  Overview:  -PNL: plan for GBS at 36wga  -Genetics: carrier screen/cfDNA/msAFP neg, anatomy US normal  -Pap: NIL 3/2/23  -Imm: RI, VNI, s/p tdpa  -Contraception: discuss at future visit  -Birthplan: discuss at future visit  -Care coordination: accepts blood transfusions, no latex allergy, call schedule reviewed        6.  Low-lying placenta, resolved  Overview:  Resolved      7. Maternal varicella, non-immune  Overview:  H/o varivax, may update postpartum at patient request      8. Ocular migraine  Overview:  -Patient reports episodes of peripheral vision loss with bright lights that lasts for 45 min followed by migraine.    -S/p optometry consult, diagnosed ocular migraines  -Now occurring more frequently.  Neurology consult ordered but not yet scheduled.  Waiting on neuro to call patient.          Labor precautions reviewed  Return for appt q2 wks x 4 then weekly x 3.    Yolanda Sanz MD

## 2025-05-08 ENCOUNTER — TELEPHONE (OUTPATIENT)
Dept: NEUROLOGY | Facility: CLINIC | Age: 25
End: 2025-05-08
Payer: COMMERCIAL

## 2025-05-08 NOTE — TELEPHONE ENCOUNTER
Caller: MERT    Relationship to patient: DR WOODSON,  OBGYN    Best call back number:   [phone]    Chief complaint: EVERY 3 DAYS THE PT IS HAVING BLIND SPOTS    Type of visit: NEW PT AND PT IS PREGNANT    Requested date: ASAP     If rescheduling, when is the original appointment:   7-14-25 AND IS ON THE WAIT LIST    Additional notes:  PT'S OBGYN IS REQUESTING PT TO BE SEEN SOONER IF POSSIBLE.    CALL PT TO RESCHEDULE IF SOONER APPT BECOMES AVAILABLE.

## 2025-05-09 ENCOUNTER — PATIENT ROUNDING (BHMG ONLY) (OUTPATIENT)
Dept: NEUROLOGY | Facility: CLINIC | Age: 25
End: 2025-05-09
Payer: COMMERCIAL

## 2025-05-09 ENCOUNTER — OFFICE VISIT (OUTPATIENT)
Dept: NEUROLOGY | Facility: CLINIC | Age: 25
End: 2025-05-09
Payer: COMMERCIAL

## 2025-05-09 VITALS
BODY MASS INDEX: 28.77 KG/M2 | WEIGHT: 179 LBS | HEART RATE: 102 BPM | SYSTOLIC BLOOD PRESSURE: 110 MMHG | OXYGEN SATURATION: 96 % | DIASTOLIC BLOOD PRESSURE: 64 MMHG | HEIGHT: 66 IN

## 2025-05-09 DIAGNOSIS — G43.109 MIGRAINE WITH AURA AND WITHOUT STATUS MIGRAINOSUS, NOT INTRACTABLE: Primary | ICD-10-CM

## 2025-05-09 RX ORDER — SUMATRIPTAN 50 MG/1
TABLET, FILM COATED ORAL
Qty: 9 TABLET | Refills: 3 | Status: SHIPPED | OUTPATIENT
Start: 2025-05-09

## 2025-05-09 RX ORDER — VERAPAMIL HYDROCHLORIDE 40 MG/1
40 TABLET ORAL 3 TIMES DAILY
Qty: 90 TABLET | Status: CANCELLED
Start: 2025-05-09

## 2025-05-09 NOTE — PROGRESS NOTES
Date of visit: 5/9/2025   Patient ID: Mayra Garner  Age: 24 y.o.     Chief compliant: No chief complaint on file.      SUBJECTIVE  Neurological Problem:  Mayra Garner is a 24 y.o.  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 They are seen for initial evaluation today for headaches. She is accompanied by her toddler son.     Initial HPI (5/9/2025):   Patient was referred by Dr. Sanz from [facility] for ocular migraines as diagnosed by her optometrist.  The patient is 29 weeks pregnant. Expected due date is 7/24/2025. She has had no complications with this pregnancy. This will be her second child. She plans to have a vaginal delivery.   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    During her first pregnancy, her peripheral vision in both eyes would get static like and then she would lose her peripheral vision for about 45 minutes. Then she would experience a head burning pain and feel like like she needed to lie down and go to sleep.  These episodes occurred again after having her first baby so she went and saw an optometrist who diagnosed them as ocular migraines. The first time this occurred was when she was in high school. She went to the ER at that time and she was reassured.    Previously she would experience this maybe 1-2 times throughout her entire pregnancy.  However in the last 2 weeks she has had an increase in symptoms.  In this last week she has had 6 headache days.  The week before that she had 4 headache days.    Onset: First occurrence happened once when she was in high school.   Location: It is like a band around her whole top of her head.   Character: Throbbing pressure.   Intensity: 6-7/10   Duration: Several hours, may go into days.  Aura/warning symptoms: Peripheral vision gets staticy and may be lost. Centeral vision becomes blurry and has flashes of light.  Frequency: In the  "last 2 weeks nearly daily. Prior to that very rare.  Light sensitivity: Yes  Sound sensitivity: Yes  Nausea: No.  Vomiting: No.   Other associated symptoms: Blurried vision. Patient denies runny nose, dizziness, diarrhea, watery eyes, weakness, nasal congestion, numbness, paralysis, conjunctival injection, lacrimation, rhinorrhea, eyelid edema, ptosis, hemifacial sweating, and miosis.  Headache free days 20 per month. Severe days 10 per months  Alleviating: Lay down in a quite, dark room and go to sleep. Caffiene.   Exacerbating/triggers: if she doesn't have enough caffeine. Poor sleep.   Aggravated by physical activity: Yes.  Positional: No.  Cardiac history: No.  Head Trauma: No.  Last Eye Exam: December 2024    Current medications:  Magnesium 400 mg. She has been on for 2 days.   Tylenol 500 mg ineffective. At most takes twice a week.     Prior medications (side effects):  None.    Sleep: Pretty interupted due to being uncomfortable with being pregnant. Wake up every couple hours to use the bathroom. Maybe only 4 hours of sleep.   Stress: \"Through the roof.\" She is stressed with pregnancy and raising her toddler. She also has a new business.   Caffeine: One cup of coffe in morning, around 200 mg of caffeine.  Water intake: a gallon a day   Work: Stay at home Mom, marketing and  as needed.   Tobacco: No.  Alcohol: No.   Substance use: No.   Family history:  Unknown.     Review of Systems   Constitutional:  Negative for fever and unexpected weight change.   HENT:  Negative for tinnitus and trouble swallowing.    Eyes:  Positive for photophobia and visual disturbance. Negative for pain and redness.   Gastrointestinal:  Negative for blood in stool, nausea and vomiting.   Genitourinary:  Negative for difficulty urinating and hematuria.   Musculoskeletal:  Negative for back pain, neck pain and neck stiffness.   Neurological:  Positive for headaches. Negative for dizziness, tremors, seizures, " syncope, facial asymmetry, speech difficulty, weakness, light-headedness and numbness.   Psychiatric/Behavioral:  Negative for dysphoric mood. The patient is nervous/anxious.       The following portions of the patient's history were reviewed and updated as appropriate: allergies, current medications, past family history, past medical history, past social history, past surgical history and problem list.    Vitals:    05/09/25 1009   BP: 110/64   Pulse: 102   SpO2: 96%     Neurological Exam  Mental Status  Awake, alert and oriented to person, place and time. Oriented to person, place, time and situation. Recent and remote memory are intact. Speech is normal. Language is fluent with no aphasia. Attention and concentration are normal. Fund of knowledge is appropriate for level of education.    Cranial Nerves  CN II: Visual acuity is normal. Visual fields full to confrontation.  CN III, IV, VI: Extraocular movements intact bilaterally. Normal lids and orbits bilaterally. Pupils equal round and reactive to light bilaterally.  CN V: Facial sensation is normal.  CN VII: Full and symmetric facial movement.  CN IX, X: Palate elevates symmetrically. Normal gag reflex.  CN XI: Shoulder shrug strength is normal.  CN XII: Tongue midline without atrophy or fasciculations.    Motor  Normal muscle bulk throughout. No fasciculations present. Normal muscle tone. No pronator drift.                                             Right                     Left   Shoulder abduction               5                          5  Elbow flexion                         5                          5  Elbow extension                    5                          5  Hip flexion                              5                          5  Knee flexion                           5                          5  Knee extension                      5                          5  Plantarflexion                         5                          5  Dorsiflexion                             5                          5    Sensory  Light touch is normal in upper and lower extremities. Pinprick is normal in upper and lower extremities. Vibration is normal in upper and lower extremities.     Reflexes                                            Right                      Left  Biceps                                 2+                         2+  Triceps                                2+                         2+  Patellar                                2+                         2+  Achilles                                2+                         2+    Right pathological reflexes: Ankle clonus absent.  Left pathological reflexes: Ankle clonus absent.    Coordination  Right: Finger-to-nose normal. Rapid alternating movement normal. Heel-to-shin normal.Left: Finger-to-nose normal. Rapid alternating movement normal. Heel-to-shin normal.    Gait  Casual gait is normal including stance, stride, and arm swing. Romberg is absent.      Physical Exam  HENT:      Head: Normocephalic.      Comments: Denies bilateral occipital notch tenderness.   Eyes:      General: Lids are normal.      Extraocular Movements: Extraocular movements intact.      Pupils: Pupils are equal, round, and reactive to light.   Musculoskeletal:      Right lower le+ Edema present.      Left lower le+ Edema present.   Neurological:      Coordination: Romberg sign negative.      Deep Tendon Reflexes:      Reflex Scores:       Tricep reflexes are 2+ on the right side and 2+ on the left side.       Bicep reflexes are 2+ on the right side and 2+ on the left side.       Patellar reflexes are 2+ on the right side and 2+ on the left side.       Achilles reflexes are 2+ on the right side and 2+ on the left side.  Psychiatric:         Speech: Speech normal.     The following imaging was reviewed by RADHA Almendarez on 2025:  Imaging: No new studies available.  Result Review :  The following data was  "reviewed by: RADHA Almendarez on 05/09/2025:  Laboratory Results:         Lab Results   Component Value Date    WBC 8.56 05/04/2025    HGB 11.1 (L) 05/04/2025    HCT 34.5 05/04/2025    MCV 91.3 05/04/2025     05/04/2025     Lab Results   Component Value Date    GLUCOSE 95 05/04/2025    BUN 6 05/04/2025    CREATININE 0.69 05/04/2025    EGFRIFNONA 108 11/24/2020    BCR 8.7 05/04/2025    K 3.9 05/04/2025    CO2 24.0 05/04/2025    CALCIUM 8.3 (L) 05/04/2025    ALBUMIN 3.3 (L) 05/04/2025    AST 18 05/04/2025    ALT 12 05/04/2025     No results found for: \"HGBA1C\"  No results found for: \"CHOL\"  Lab Results   Component Value Date    HDL 85 01/28/2022     Lab Results   Component Value Date    LDL 66 01/28/2022     Lab Results   Component Value Date    TRIG 74 01/28/2022     Lab Results   Component Value Date    RPR Non Reactive 11/26/2024     Lab Results   Component Value Date    TSH 1.470 04/15/2024     Lab Results   Component Value Date    RMEXMNHT47 561 04/15/2024     Lab Results   Component Value Date    FOLATE 11.5 04/15/2024       Assessment  Mayra Garner is a 24 y.o. female who presents for evaluation of headaches. The diagnosis is most consistent with migraine with aura. This is supported by the intensity of pain, duration, aura symptoms of visual changes and visual loss, photophobia, and phonophobia.  Neurology was the patient is less likely to be experiencing IIH due to an appropriate weight, lack of pulsatile tinnitus, and vision loss with bending over.     Due to the frequency of patients headaches, she would be appropreiate to consider a daily migraine preventative medication. She is already taking Magnesium which is considered safe in pregnancy. We will add on Riboflavin (B2) to this. If at next visit, symptoms have not improved, we could consider Propanolol for migraine prevention. Propanolol is considered a more safe option in pregnancy and is often most effective. Propanolol has a " long history of use in pregnancy where she is really doing very well every third month the patient is, although some studies show some concern for intrauterine growth restriction,  bradycardia, hypogylcemia, and possible multicystic renal dysphasia risk. Per chart review her blood pressures average systolic 100-130/60-75. Heart rate in the 90s. This could be considered an option but would require close monitoring for hypotension and dizziness. Per Dr. Pulido at [facility], as it relates to her vascular malformation, there is no contraindication or no reason why she can't take a beta blocker.    Other migraine preventative medications used in pregnancy to consider are provided below: Both normal.  Memantine is considered more safe and moderately effective. Studies in animals showed no teratogenicity, however there is limited data.   Calcium channel blockers like Verapimil do not have an increase in congenital anomalies that have been reported in humans, although information is limited   If oral medications are ineffective, could consider nerve blocks with Lidocaine.      Diagnoses and all orders for this visit:    1. Migraine with aura and without status migrainosus, not intractable (Primary)  -     MRI Brain Without Contrast; Future  -     MRI Angiogram Venogram Head; Future  -     Riboflavin 400 MG capsule; Take 1 capsule by mouth Daily for 120 days.  Dispense: 30 capsule; Refill: 3  -     SUMAtriptan (IMITREX) 50 MG tablet; Take one tablet at onset of headache. May repeat dose one time in 2 hours if headache not relieved.  Dispense: 9 tablet; Refill: 3         Plan:    Continue Magnesium 400 mg and add Riboflavin 400 mg daily for migraine prevention.  Tylenol 1000 mg q8 hours PRN. No more than 4 grams a day. Do not use more than 2 days a week.  Prescribe Sumatriptan 50 mg PRN. Triptans selectively vasoconstrict brain vessels, but there is a  theoretical risk that this will cause vasoconstriction in the uterus. In studies where the Triptan exposed groups were compared with healthy controls, rates of major congenital abnormalities and  birth were similar. However, there was a slight increase of early pregnancy loss in the Triptan group.  MRI Brain and MRI Angiogram Venogram to rule out other structural or vascular causes of headaches.    We will see Mayra back in 4 weeks, sooner if symptoms warrant or if the patient has concerns. The patient is encouraged to reach out via MyChart or by calling the office if there are any questions or concerns with today's plan.     Allyson Romero, RADHA    I spent 60 minutes caring for this patient on this date of service. This time includes time spent by me in the following activities as necessary: preparing for the visit, reviewing tests, medical records and previous visits, obtaining and/or reviewing a separately obtained history, performing a medically appropriate exam and/or evaluation, counseling and educating the patient, and/or communicating with other healthcare professionals, documenting information in the medical record, independently interpreting results and communicating that information with the patient, and developing a medically appropriate treatment plan with consideration of other conditions, medications, and treatments.

## 2025-05-12 ENCOUNTER — PATIENT ROUNDING (BHMG ONLY) (OUTPATIENT)
Dept: NEUROLOGY | Facility: CLINIC | Age: 25
End: 2025-05-12
Payer: COMMERCIAL